# Patient Record
Sex: FEMALE | Race: WHITE | NOT HISPANIC OR LATINO | Employment: FULL TIME | URBAN - METROPOLITAN AREA
[De-identification: names, ages, dates, MRNs, and addresses within clinical notes are randomized per-mention and may not be internally consistent; named-entity substitution may affect disease eponyms.]

---

## 2017-01-13 DIAGNOSIS — I10 BENIGN ESSENTIAL HYPERTENSION: Primary | ICD-10-CM

## 2017-01-13 NOTE — TELEPHONE ENCOUNTER
Lisinopril      Last Written Prescription Date: 06/17/16  Last Fill Quantity: 90, # refills: 1  Last Office Visit with G, [facility] or [facility] prescribing provider: 01/04/16   Next 5 appointments (look out 90 days)     Feb 21, 2017  9:20 AM   [facility] WADE Dillon C[facility]    [address]  [facility] [id number] [phone]                   POTASSIUM   Date Value Ref Range Status   01/04/2016 4.0 3.4 - 5.3 mmol/L Final     CREATININE   Date Value Ref Range Status   01/04/2016 0.36* 0.52 - 1.04 mg/dL Final     BP Readings from Last 3 Encounters:   01/04/16 150/70   12/02/14 136/84   12/03/13 126/62

## 2017-01-20 RX ORDER — LISINOPRIL 10 MG/1
10 TABLET ORAL DAILY
Qty: 90 TABLET | Refills: 0 | Status: SHIPPED | OUTPATIENT
Start: 2017-01-20 | End: 2017-02-21

## 2017-01-20 NOTE — TELEPHONE ENCOUNTER
Routing refill request to provider for review/approval because:  Labs out of range:  Creatinine and BP elevated. Has upcoming appt with  in February.

## 2017-02-21 ENCOUNTER — OFFICE VISIT (OUTPATIENT)
Dept: INTERNAL MEDICINE | Facility: CLINIC | Age: 47
End: 2017-02-21
Payer: COMMERCIAL

## 2017-02-21 VITALS
WEIGHT: 194 LBS | OXYGEN SATURATION: 98 % | RESPIRATION RATE: 12 BRPM | DIASTOLIC BLOOD PRESSURE: 64 MMHG | SYSTOLIC BLOOD PRESSURE: 120 MMHG | TEMPERATURE: 98.7 F | BODY MASS INDEX: 34.38 KG/M2 | HEIGHT: 63 IN | HEART RATE: 72 BPM

## 2017-02-21 DIAGNOSIS — Z23 NEED FOR PROPHYLACTIC VACCINATION AND INOCULATION AGAINST INFLUENZA: ICD-10-CM

## 2017-02-21 DIAGNOSIS — I10 ESSENTIAL HYPERTENSION, BENIGN: ICD-10-CM

## 2017-02-21 DIAGNOSIS — R73.09 ABNORMAL GLUCOSE: ICD-10-CM

## 2017-02-21 DIAGNOSIS — Z12.31 ENCOUNTER FOR SCREENING MAMMOGRAM FOR BREAST CANCER: ICD-10-CM

## 2017-02-21 DIAGNOSIS — Z00.01 ENCOUNTER FOR GENERAL ADULT MEDICAL EXAMINATION WITH ABNORMAL FINDINGS: Primary | ICD-10-CM

## 2017-02-21 DIAGNOSIS — Z13.6 CARDIOVASCULAR SCREENING; LDL GOAL LESS THAN 130: ICD-10-CM

## 2017-02-21 DIAGNOSIS — Z83.3 FAMILY HISTORY OF DIABETES MELLITUS: ICD-10-CM

## 2017-02-21 DIAGNOSIS — I10 BENIGN ESSENTIAL HYPERTENSION: ICD-10-CM

## 2017-02-21 LAB
ALBUMIN SERPL-MCNC: 4.2 G/DL (ref 3.4–5)
ALP SERPL-CCNC: 28 U/L (ref 40–150)
ALT SERPL W P-5'-P-CCNC: 22 U/L (ref 0–50)
ANION GAP SERPL CALCULATED.3IONS-SCNC: 7 MMOL/L (ref 3–14)
AST SERPL W P-5'-P-CCNC: 15 U/L (ref 0–45)
BASOPHILS # BLD AUTO: 0 10E9/L (ref 0–0.2)
BASOPHILS NFR BLD AUTO: 0.3 %
BILIRUB SERPL-MCNC: 0.4 MG/DL (ref 0.2–1.3)
BUN SERPL-MCNC: 8 MG/DL (ref 7–30)
CALCIUM SERPL-MCNC: 9.6 MG/DL (ref 8.5–10.1)
CHLORIDE SERPL-SCNC: 103 MMOL/L (ref 94–109)
CHOLEST SERPL-MCNC: 172 MG/DL
CO2 SERPL-SCNC: 27 MMOL/L (ref 20–32)
CREAT SERPL-MCNC: 0.26 MG/DL (ref 0.52–1.04)
CREAT UR-MCNC: 27 MG/DL
DIFFERENTIAL METHOD BLD: NORMAL
EOSINOPHIL # BLD AUTO: 0.1 10E9/L (ref 0–0.7)
EOSINOPHIL NFR BLD AUTO: 1.4 %
ERYTHROCYTE [DISTWIDTH] IN BLOOD BY AUTOMATED COUNT: 12.6 % (ref 10–15)
GFR SERPL CREATININE-BSD FRML MDRD: ABNORMAL ML/MIN/1.7M2
GLUCOSE SERPL-MCNC: 99 MG/DL (ref 70–99)
HBA1C MFR BLD: 5.5 % (ref 4.3–6)
HCT VFR BLD AUTO: 41.7 % (ref 35–47)
HDLC SERPL-MCNC: 53 MG/DL
HGB BLD-MCNC: 14.2 G/DL (ref 11.7–15.7)
LDLC SERPL CALC-MCNC: 95 MG/DL
LYMPHOCYTES # BLD AUTO: 1.5 10E9/L (ref 0.8–5.3)
LYMPHOCYTES NFR BLD AUTO: 16.6 %
MCH RBC QN AUTO: 30.9 PG (ref 26.5–33)
MCHC RBC AUTO-ENTMCNC: 34.1 G/DL (ref 31.5–36.5)
MCV RBC AUTO: 91 FL (ref 78–100)
MICROALBUMIN UR-MCNC: 6 MG/L
MICROALBUMIN/CREAT UR: 21.57 MG/G CR (ref 0–25)
MONOCYTES # BLD AUTO: 0.7 10E9/L (ref 0–1.3)
MONOCYTES NFR BLD AUTO: 7.6 %
NEUTROPHILS # BLD AUTO: 6.7 10E9/L (ref 1.6–8.3)
NEUTROPHILS NFR BLD AUTO: 74.1 %
NONHDLC SERPL-MCNC: 119 MG/DL
PLATELET # BLD AUTO: 271 10E9/L (ref 150–450)
POTASSIUM SERPL-SCNC: 4.3 MMOL/L (ref 3.4–5.3)
PROT SERPL-MCNC: 7.3 G/DL (ref 6.8–8.8)
RBC # BLD AUTO: 4.6 10E12/L (ref 3.8–5.2)
SODIUM SERPL-SCNC: 137 MMOL/L (ref 133–144)
TRIGL SERPL-MCNC: 118 MG/DL
TSH SERPL DL<=0.005 MIU/L-ACNC: 1.75 MU/L (ref 0.4–4)
WBC # BLD AUTO: 9.1 10E9/L (ref 4–11)

## 2017-02-21 PROCEDURE — 90686 IIV4 VACC NO PRSV 0.5 ML IM: CPT | Performed by: NURSE PRACTITIONER

## 2017-02-21 PROCEDURE — 82043 UR ALBUMIN QUANTITATIVE: CPT | Performed by: NURSE PRACTITIONER

## 2017-02-21 PROCEDURE — 36415 COLL VENOUS BLD VENIPUNCTURE: CPT | Performed by: NURSE PRACTITIONER

## 2017-02-21 PROCEDURE — 90471 IMMUNIZATION ADMIN: CPT | Performed by: NURSE PRACTITIONER

## 2017-02-21 PROCEDURE — 83036 HEMOGLOBIN GLYCOSYLATED A1C: CPT | Performed by: NURSE PRACTITIONER

## 2017-02-21 PROCEDURE — 99396 PREV VISIT EST AGE 40-64: CPT | Mod: 25 | Performed by: NURSE PRACTITIONER

## 2017-02-21 PROCEDURE — 80050 GENERAL HEALTH PANEL: CPT | Performed by: NURSE PRACTITIONER

## 2017-02-21 PROCEDURE — 80061 LIPID PANEL: CPT | Performed by: NURSE PRACTITIONER

## 2017-02-21 RX ORDER — LISINOPRIL 10 MG/1
10 TABLET ORAL DAILY
Qty: 90 TABLET | Refills: 3 | Status: SHIPPED | OUTPATIENT
Start: 2017-02-21 | End: 2018-03-20

## 2017-02-21 RX ORDER — DILTIAZEM HYDROCHLORIDE 360 MG/1
360 CAPSULE, EXTENDED RELEASE ORAL DAILY
Qty: 90 CAPSULE | Refills: 3 | Status: SHIPPED | OUTPATIENT
Start: 2017-02-21 | End: 2018-03-20

## 2017-02-21 NOTE — MR AVS SNAPSHOT
After Visit Summary   2/21/2017    Amalia Newman    MRN: [id number]           Patient Information     Date Of Birth          1970        Visit Information        Provider Department      2/21/2017 9:20 AM Amina Olivares APRN [facility]        Today's Diagnoses     Encounter for general adult medical examination with abnormal findings    -  1    Benign essential hypertension        CARDIOVASCULAR SCREENING; LDL GOAL LESS THAN 130        Abnormal glucose        Family history of diabetes mellitus        BENIGN HYPERTENSION        Encounter for screening mammogram for breast cancer          Care Instructions    Labs in [address]    Refill on medication sent in     To schedule your mammogram, you may call the breast center at [phone].     Preventive Health Recommendations  Female Ages 40 to 49    Yearly exam:     See your health care provider every year in order to  1. Review health changes.   2. Discuss preventive care.    3. Review your medicines if your doctor prescribed any.      Get a Pap test every three years (unless you have an abnormal result and your provider advises testing more often).      If you get Pap tests with HPV test, you only need to test every 5 years, unless you have an abnormal result. You do not need a Pap test if your uterus was removed (hysterectomy) and you have not had cancer.      You should be tested each year for STDs (sexually transmitted diseases), if you're at risk.       Ask your doctor if you should have a mammogram.      Have a colonoscopy (test for colon cancer) if someone in your family has had colon cancer or polyps before age 50.       Have a cholesterol test every 5 years.       Have a diabetes test (fasting glucose) after age 45. If you are at risk for diabetes, you should have this test every 3 years.    Shots: Get a flu shot each year. Get a tetanus shot every 10 years.     Nutrition:     Eat at least 5 servings of  "fruits and vegetables each day.    Eat whole-grain bread, whole-wheat pasta and brown rice instead of white grains and rice.    Talk to your provider about Calcium and Vitamin D.     Lifestyle    Exercise at least 150 minutes a week (an average of 30 minutes a day, 5 days a week). This will help you control your weight and prevent disease.    Limit alcohol to one drink per day.    No smoking.     Wear sunscreen to prevent skin cancer.    See your dentist every six months for an exam and cleaning.        Follow-ups after your visit        Future tests that were ordered for you today     Open Future Orders        Priority Expected Expires Ordered    *MA Screening Digital Bilateral Routine  2/21/2018 2/21/2017            Who to contact     If you have questions or need follow up information about today's clinic visit or your schedule please contact [facility] directly at [phone].  Normal or non-critical lab and imaging results will be communicated to you by Opowerhart, letter or phone within 4 business days after the clinic has received the results. If you do not hear from us within 7 days, please contact the clinic through Opowerhart or phone. If you have a critical or abnormal lab result, we will notify you by phone as soon as possible.  Submit refill requests through [employer]Brick or call your pharmacy and they will forward the refill request to us. Please allow 3 business days for your refill to be completed.          Additional Information About Your Visit        SteelBrick Information     SteelBrick lets you send messages to your doctor, view your test results, renew your prescriptions, schedule appointments and more. To sign up, go to [email or web address] . Click on \"Log in\" on the left side of the screen, which will take you to the Welcome page. Then click on \"Sign up Now\" on the right side of the page.     You will be asked to enter the access code listed below, as well as some personal information. " "Please follow the directions to create your username and password.     Your access code is: [email or web address]  Expires: 2017 10:08 AM     Your access code will  in 90 days. If you need help or a new code, please call your [place] clinic or [phone].        Care EveryWhere ID     This is your Care EveryWhere ID. This could be used by other organizations to access your [place] medical records  [id number]        Your Vitals Were     Pulse Temperature Respirations Height Last Period Pulse Oximetry    72 98.7  F (37.1  C) (Oral) 12 5' 3\" (1.6 m) 2005 98%    BMI (Body Mass Index)                   34.37 kg/m2            Blood Pressure from Last 3 Encounters:   17 120/64   16 150/70   14 136/84    Weight from Last 3 Encounters:   17 194 lb (88 kg)   16 192 lb (87.1 kg)   14 184 lb 1.6 oz (83.5 kg)              We Performed the Following     Albumin Random Urine Quantitative     CBC with platelets differential     Comprehensive metabolic panel     Hemoglobin A1c     Lipid panel reflex to direct LDL     TSH with free T4 reflex          Today's Medication Changes          These changes are accurate as of: 17 10:08 AM.  If you have any questions, ask your nurse or doctor.               These medicines have changed or have updated prescriptions.        Dose/Directions    diltiazem 360 MG 24 hr CD capsule   Commonly known as:  CARDIZEM CD   This may have changed:  additional instructions   Used for:  Essential hypertension, benign   Changed by:  Amina Olivares APRN CNP        Dose:  360 mg   Take 1 capsule (360 mg) by mouth daily   Quantity:  90 capsule   Refills:  3            Where to get your medicines      These medications were sent to [place] [address] [place] [address] [place] [id number]     Phone:  [phone]     diltiazem 360 MG 24 hr CD capsule    lisinopril 10 MG tablet                Primary Care " Provider Office Phone # Fax #    Eun Cintron [phone]       [facility] [address] [address]        Thank you!     Thank you for choosing [facility]  for your care. Our goal is always to provide you with excellent care. Hearing back from our patients is one way we can continue to improve our services. Please take a few minutes to complete the written survey that you may receive in the mail after your visit with us. Thank you!             Your Updated Medication List - Protect others around you: Learn how to safely use, store and throw away your medicines at www.disposemymeds.org.          This list is accurate as of: 2/21/17 10:08 AM.  Always use your most recent med list.                   Brand Name Dispense Instructions for use    diltiazem 360 MG 24 hr CD capsule    CARDIZEM CD    90 capsule    Take 1 capsule (360 mg) by mouth daily       lisinopril 10 MG tablet    PRINIVIL/ZESTRIL    90 tablet    Take 1 tablet (10 mg) by mouth daily

## 2017-02-21 NOTE — PROGRESS NOTES
"   SUBJECTIVE:     CC: Amalia Newman is an 46 year old woman who presents for preventive health visit.     Healthy Habits:    Do you get at least three servings of calcium containing foods daily (dairy, green leafy vegetables, etc.)? yes    Amount of exercise or daily activities, outside of work: Walking halls and outside and very active at work day(s) per week    Problems taking medications regularly No    Medication side effects: No    Have you had an eye exam in the past two years? no    Do you see a dentist twice per year? no    Do you have sleep apnea, excessive snoring or daytime drowsiness?no        Blood pressure in good range on current medication     Today's PHQ-2 Score:   PHQ-2 ( 1999 Pfizer) 2/21/2017 1/4/2016   Q1: Little interest or pleasure in doing things 0 0   Q2: Feeling down, depressed or hopeless 0 0   PHQ-2 Score 0 0       Abuse: Current or Past(Physical, Sexual or Emotional)- No  Do you feel safe in your environment - Yes    Social History   Substance Use Topics     Smoking status: Never Smoker     Smokeless tobacco: Never Used     Alcohol use No      Comment: \"very rare\"     The patient does not drink >3 drinks per day nor >7 drinks per week.    Recent Labs   Lab Test  01/04/16   1117  12/02/14   0913  10/28/13   0945   CHOL  210*  191  247*   HDL  49*  62  53   LDL  129*  111  152*   TRIG  158*  90  207*   CHOLHDLRATIO   --   3.1  4.7   NHDL  161*   --    --        Reviewed orders with patient.  Reviewed health maintenance and updated orders accordingly - Yes    Mammo Decision Support:      Pertinent mammograms are reviewed under the imaging tab.  History of abnormal Pap smear: hysterectomy 2006  All Histories reviewed and updated in Epic.      ROS:  C: NEGATIVE for fever, chills, change in weight  I: NEGATIVE for worrisome rashes, moles or lesions  E: NEGATIVE for vision changes or irritation  ENT: NEGATIVE for ear, mouth and throat problems  R: NEGATIVE for significant cough or SOB  B: " "NEGATIVE for masses, tenderness or discharge  CV: NEGATIVE for chest pain, palpitations or peripheral edema  GI: NEGATIVE for nausea, abdominal pain, heartburn, or change in bowel habits  : NEGATIVE for unusual urinary or vaginal symptoms. No vaginal bleeding.  M: NEGATIVE for significant arthralgias or myalgia  N: NEGATIVE for weakness, dizziness or paresthesias  P: NEGATIVE for changes in mood or affect     Problem list, Medication list, Allergies, and Medical/Social/Surgical histories reviewed in [facility] and updated as appropriate.  OBJECTIVE:     /64 (BP Location: Left arm, Patient Position: Chair, Cuff Size: Adult Large)  Pulse 72  Temp 98.7  F (37.1  C) (Oral)  Resp 12  Ht 5' 3\" (1.6 m)  Wt 194 lb (88 kg)  LMP 12/06/2005  SpO2 98%  BMI 34.37 kg/m2  EXAM:  GENERAL APPEARANCE:  alert and no distress  EYES: Eyes grossly normal to inspection, and conjunctivae and sclerae normal  HENT: ear canals and TM's normal, nose and mouth without ulcers or lesions, oropharynx clear and oral mucous membranes moist  NECK: no adenopathy, no asymmetry, masses, or scars and thyroid normal to palpation  RESP: lungs clear to auscultation - no rales, rhonchi or wheezes  BREAST: large  without masses, tenderness or nipple discharge and no palpable axillary masses or adenopathy  CV: regular rate and rhythm, normal S1 S2, no S3 or S4, no murmur, click or rub, no peripheral edema and peripheral pulses strong  ABDOMEN: soft, nontender, no hepatosplenomegaly, no masses and bowel sounds normal  MS: no musculoskeletal defects are noted and gait is age appropriate without ataxia  SKIN: no suspicious lesions or rashes  NEURO: Normal strength and tone, sensory exam grossly normal, mentation intact and speech normal  PSYCH: mentation appears normal and affect normal/bright    ASSESSMENT/PLAN:         ICD-10-CM    1. Encounter for general adult medical examination with abnormal findings Z00.01 Lipid panel reflex to direct LDL     " "Comprehensive metabolic panel     TSH with free T4 reflex     CBC with platelets differential   2. Benign essential hypertension I10 Lipid panel reflex to direct LDL     Comprehensive metabolic panel     TSH with free T4 reflex     CBC with platelets differential     Albumin Random Urine Quantitative     lisinopril (PRINIVIL/ZESTRIL) 10 MG tablet   3. CARDIOVASCULAR SCREENING; LDL GOAL LESS THAN 130 Z13.6 Lipid panel reflex to direct LDL     Comprehensive metabolic panel   4. Abnormal glucose R73.09 Hemoglobin A1c   5. Family history of diabetes mellitus Z83.3 Hemoglobin A1c   6. BENIGN HYPERTENSION I10 diltiazem (CARDIZEM CD) 360 MG 24 hr CD capsule   7. Encounter for screening mammogram for breast cancer Z12.31 MA Screen Bilateral w/Kenneth     CANCELED: *MA Screening Digital Bilateral   8. Need for prophylactic vaccination and inoculation against influenza Z23 FLU VACCINE, 3 YRS +, IM       COUNSELING:   Reviewed preventive health counseling, as reflected in patient instructions       Regular exercise       Healthy diet/nutrition       Osteoporosis Prevention/Bone Health         reports that she has never smoked. She has never used smokeless tobacco.    Estimated body mass index is 34.37 kg/(m^2) as calculated from the following:    Height as of this encounter: 5' 3\" (1.6 m).    Weight as of this encounter: 194 lb (88 kg).   Weight management plan: Discussed healthy diet and exercise guidelines and patient will follow up in 12 months in clinic to re-evaluate.    Counseling Resources:  ATP IV Guidelines  Pooled Cohorts Equation Calculator  Breast Cancer Risk Calculator  FRAX Risk Assessment  ICSI Preventive Guidelines  Dietary Guidelines for Americans, 2010  USDA's MyPlate  ASA Prophylaxis  Lung CA Screening    WADE Dillon CNP  [facility]  "

## 2017-02-21 NOTE — PATIENT INSTRUCTIONS
Labs in [address]    Refill on medication sent in     To schedule your mammogram, you may call the breast center at [phone].     Preventive Health Recommendations  Female Ages 40 to 49    Yearly exam:     See your health care provider every year in order to  1. Review health changes.   2. Discuss preventive care.    3. Review your medicines if your doctor prescribed any.      Get a Pap test every three years (unless you have an abnormal result and your provider advises testing more often).      If you get Pap tests with HPV test, you only need to test every 5 years, unless you have an abnormal result. You do not need a Pap test if your uterus was removed (hysterectomy) and you have not had cancer.      You should be tested each year for STDs (sexually transmitted diseases), if you're at risk.       Ask your doctor if you should have a mammogram.      Have a colonoscopy (test for colon cancer) if someone in your family has had colon cancer or polyps before age 50.       Have a cholesterol test every 5 years.       Have a diabetes test (fasting glucose) after age 45. If you are at risk for diabetes, you should have this test every 3 years.    Shots: Get a flu shot each year. Get a tetanus shot every 10 years.     Nutrition:     Eat at least 5 servings of fruits and vegetables each day.    Eat whole-grain bread, whole-wheat pasta and brown rice instead of white grains and rice.    Talk to your provider about Calcium and Vitamin D.     Lifestyle    Exercise at least 150 minutes a week (an average of 30 minutes a day, 5 days a week). This will help you control your weight and prevent disease.    Limit alcohol to one drink per day.    No smoking.     Wear sunscreen to prevent skin cancer.    See your dentist every six months for an exam and cleaning.

## 2017-02-22 ENCOUNTER — TELEPHONE (OUTPATIENT)
Dept: INTERNAL MEDICINE | Facility: CLINIC | Age: 47
End: 2017-02-22

## 2017-02-22 ENCOUNTER — HOSPITAL ENCOUNTER (OUTPATIENT)
Dept: MAMMOGRAPHY | Facility: CLINIC | Age: 47
Discharge: HOME | End: 2017-02-22
Attending: NURSE PRACTITIONER | Admitting: NURSE PRACTITIONER
Payer: COMMERCIAL

## 2017-02-22 DIAGNOSIS — M76.60 ACHILLES TENDON PAIN: Primary | ICD-10-CM

## 2017-02-22 DIAGNOSIS — Z12.31 ENCOUNTER FOR SCREENING MAMMOGRAM FOR BREAST CANCER: ICD-10-CM

## 2017-02-22 DIAGNOSIS — R60.9 EDEMA, UNSPECIFIED TYPE: ICD-10-CM

## 2017-02-22 PROCEDURE — G0202 SCR MAMMO BI INCL CAD: HCPCS

## 2017-02-22 NOTE — TELEPHONE ENCOUNTER
I would have her see podiatry ; I am not sure if x ray would be beneficial   [facility]: [facility] [place] [phone]   [email or web address]    Do not think that she needs to be on lasix for swelling   Would suggest compression socks  Prescription done

## 2017-02-22 NOTE — TELEPHONE ENCOUNTER
"Pt calls stating after leaving [facility] yesterday was thinking about her foot pain. Pt states has had the achilles pain for years and is requesting the xray as discussed. Pt is also wondering if she may need Lasix PRN for the \"puffiness\" around ankles. Please advise.   "

## 2017-02-22 NOTE — TELEPHONE ENCOUNTER
Called pt, left detailed message advising of below. Asked pt to call back to see where she would like the Rx for compression socks to go, it is at the [facility]. Radha Kelsey RN

## 2017-07-18 DIAGNOSIS — I10 ESSENTIAL HYPERTENSION, BENIGN: ICD-10-CM

## 2017-07-18 RX ORDER — DILTIAZEM HYDROCHLORIDE 360 MG/1
CAPSULE, EXTENDED RELEASE ORAL
Qty: 90 CAPSULE | Refills: 1 | OUTPATIENT
Start: 2017-07-18

## 2017-07-18 NOTE — TELEPHONE ENCOUNTER
Diltiazem          Last Written Prescription Date: 02/21/17  Last Fill Quantity: 90, # refills: 2    Last Office Visit with G, P or [facility] prescribing provider:  02/21/17   Future Office Visit:      BP Readings from Last 3 Encounters:   02/21/17 120/64   01/04/16 150/70   12/02/14 136/84     Lab Results   Component Value Date    ALT 22 02/21/2017     Lab Results   Component Value Date    CHOL 172 02/21/2017     Lab Results   Component Value Date    HDL 53 02/21/2017     Lab Results   Component Value Date    LDL 95 02/21/2017     Lab Results   Component Value Date    TRIG 118 02/21/2017     Lab Results   Component Value Date    CHOLHDLRATIO 3.1 12/02/2014       Labs showing if normal/abnormal  Lab Results   Component Value Date    ALT 22 02/21/2017     Lab Results   Component Value Date    CHOL 172 02/21/2017    TRIG 118 02/21/2017    HDL 53 02/21/2017    LDL 95 02/21/2017    VLDL 18 12/02/2014    CHOLHDLRATIO 3.1 12/02/2014

## 2017-10-08 DIAGNOSIS — I10 BENIGN ESSENTIAL HYPERTENSION: ICD-10-CM

## 2017-10-09 RX ORDER — LISINOPRIL 10 MG/1
TABLET ORAL
Qty: 90 TABLET | Refills: 0 | OUTPATIENT
Start: 2017-10-09

## 2017-10-09 NOTE — TELEPHONE ENCOUNTER
LISINOPRIL      Last Written Prescription Date: 02/21/17  Last Fill Quantity: 90, # refills: 3  Last Office Visit with G, P or [facility] prescribing provider: 02/21/17       Potassium   Date Value Ref Range Status   02/21/2017 4.3 3.4 - 5.3 mmol/L Final     Creatinine   Date Value Ref Range Status   02/21/2017 0.26 (L) 0.52 - 1.04 mg/dL Final     BP Readings from Last 3 Encounters:   02/21/17 120/64   01/04/16 150/70   12/02/14 136/84

## 2018-03-06 ENCOUNTER — HOSPITAL ENCOUNTER (OUTPATIENT)
Dept: MAMMOGRAPHY | Facility: CLINIC | Age: 48
Discharge: HOME | End: 2018-03-06
Attending: NURSE PRACTITIONER | Admitting: NURSE PRACTITIONER
Payer: COMMERCIAL

## 2018-03-06 DIAGNOSIS — Z12.31 VISIT FOR SCREENING MAMMOGRAM: ICD-10-CM

## 2018-03-06 PROCEDURE — 77067 SCR MAMMO BI INCL CAD: CPT

## 2018-03-20 ENCOUNTER — OFFICE VISIT (OUTPATIENT)
Dept: INTERNAL MEDICINE | Facility: CLINIC | Age: 48
End: 2018-03-20
Payer: COMMERCIAL

## 2018-03-20 VITALS
TEMPERATURE: 98.9 F | WEIGHT: 192.5 LBS | DIASTOLIC BLOOD PRESSURE: 70 MMHG | HEART RATE: 100 BPM | OXYGEN SATURATION: 97 % | BODY MASS INDEX: 34.11 KG/M2 | HEIGHT: 63 IN | SYSTOLIC BLOOD PRESSURE: 114 MMHG

## 2018-03-20 DIAGNOSIS — I10 BENIGN ESSENTIAL HYPERTENSION: ICD-10-CM

## 2018-03-20 DIAGNOSIS — E78.1 PURE HYPERGLYCERIDEMIA: ICD-10-CM

## 2018-03-20 DIAGNOSIS — I10 ESSENTIAL HYPERTENSION, BENIGN: ICD-10-CM

## 2018-03-20 DIAGNOSIS — Z13.6 CARDIOVASCULAR SCREENING; LDL GOAL LESS THAN 130: ICD-10-CM

## 2018-03-20 DIAGNOSIS — Z00.01 ENCOUNTER FOR GENERAL ADULT MEDICAL EXAMINATION WITH ABNORMAL FINDINGS: Primary | ICD-10-CM

## 2018-03-20 DIAGNOSIS — R73.09 ABNORMAL GLUCOSE: ICD-10-CM

## 2018-03-20 DIAGNOSIS — L73.9 INFLAMED HAIR FOLLICLE: ICD-10-CM

## 2018-03-20 LAB
ALBUMIN SERPL-MCNC: 4.2 G/DL (ref 3.4–5)
ALP SERPL-CCNC: 29 U/L (ref 40–150)
ALT SERPL W P-5'-P-CCNC: 25 U/L (ref 0–50)
ANION GAP SERPL CALCULATED.3IONS-SCNC: 7 MMOL/L (ref 3–14)
AST SERPL W P-5'-P-CCNC: 17 U/L (ref 0–45)
BASOPHILS # BLD AUTO: 0 10E9/L (ref 0–0.2)
BASOPHILS NFR BLD AUTO: 0.4 %
BILIRUB SERPL-MCNC: 0.4 MG/DL (ref 0.2–1.3)
BUN SERPL-MCNC: 10 MG/DL (ref 7–30)
CALCIUM SERPL-MCNC: 9.8 MG/DL (ref 8.5–10.1)
CHLORIDE SERPL-SCNC: 105 MMOL/L (ref 94–109)
CHOLEST SERPL-MCNC: 194 MG/DL
CO2 SERPL-SCNC: 26 MMOL/L (ref 20–32)
CREAT SERPL-MCNC: 0.29 MG/DL (ref 0.52–1.04)
CREAT UR-MCNC: 78 MG/DL
DIFFERENTIAL METHOD BLD: NORMAL
EOSINOPHIL # BLD AUTO: 0.1 10E9/L (ref 0–0.7)
EOSINOPHIL NFR BLD AUTO: 1.2 %
ERYTHROCYTE [DISTWIDTH] IN BLOOD BY AUTOMATED COUNT: 12.4 % (ref 10–15)
GFR SERPL CREATININE-BSD FRML MDRD: >90 ML/MIN/1.7M2
GLUCOSE SERPL-MCNC: 95 MG/DL (ref 70–99)
HBA1C MFR BLD: 5.6 % (ref 4.3–6)
HCT VFR BLD AUTO: 41.5 % (ref 35–47)
HDLC SERPL-MCNC: 51 MG/DL
HGB BLD-MCNC: 13.9 G/DL (ref 11.7–15.7)
LDLC SERPL CALC-MCNC: 111 MG/DL
LYMPHOCYTES # BLD AUTO: 1.8 10E9/L (ref 0.8–5.3)
LYMPHOCYTES NFR BLD AUTO: 19.8 %
MCH RBC QN AUTO: 30.9 PG (ref 26.5–33)
MCHC RBC AUTO-ENTMCNC: 33.5 G/DL (ref 31.5–36.5)
MCV RBC AUTO: 92 FL (ref 78–100)
MICROALBUMIN UR-MCNC: 14 MG/L
MICROALBUMIN/CREAT UR: 18.43 MG/G CR (ref 0–25)
MONOCYTES # BLD AUTO: 0.8 10E9/L (ref 0–1.3)
MONOCYTES NFR BLD AUTO: 8.4 %
NEUTROPHILS # BLD AUTO: 6.4 10E9/L (ref 1.6–8.3)
NEUTROPHILS NFR BLD AUTO: 70.2 %
NONHDLC SERPL-MCNC: 143 MG/DL
PLATELET # BLD AUTO: 290 10E9/L (ref 150–450)
POTASSIUM SERPL-SCNC: 3.8 MMOL/L (ref 3.4–5.3)
PROT SERPL-MCNC: 7.4 G/DL (ref 6.8–8.8)
RBC # BLD AUTO: 4.5 10E12/L (ref 3.8–5.2)
SODIUM SERPL-SCNC: 138 MMOL/L (ref 133–144)
TRIGL SERPL-MCNC: 161 MG/DL
TSH SERPL DL<=0.005 MIU/L-ACNC: 1.35 MU/L (ref 0.4–4)
WBC # BLD AUTO: 9.1 10E9/L (ref 4–11)

## 2018-03-20 PROCEDURE — 80053 COMPREHEN METABOLIC PANEL: CPT | Performed by: NURSE PRACTITIONER

## 2018-03-20 PROCEDURE — 83036 HEMOGLOBIN GLYCOSYLATED A1C: CPT | Performed by: NURSE PRACTITIONER

## 2018-03-20 PROCEDURE — 99396 PREV VISIT EST AGE 40-64: CPT | Performed by: NURSE PRACTITIONER

## 2018-03-20 PROCEDURE — 36415 COLL VENOUS BLD VENIPUNCTURE: CPT | Performed by: NURSE PRACTITIONER

## 2018-03-20 PROCEDURE — 82043 UR ALBUMIN QUANTITATIVE: CPT | Performed by: NURSE PRACTITIONER

## 2018-03-20 PROCEDURE — 80061 LIPID PANEL: CPT | Performed by: NURSE PRACTITIONER

## 2018-03-20 PROCEDURE — 85025 COMPLETE CBC W/AUTO DIFF WBC: CPT | Performed by: NURSE PRACTITIONER

## 2018-03-20 PROCEDURE — 84443 ASSAY THYROID STIM HORMONE: CPT | Performed by: NURSE PRACTITIONER

## 2018-03-20 RX ORDER — ASCORBIC ACID 500 MG
1000 TABLET ORAL DAILY
Qty: 30 TABLET | COMMUNITY
Start: 2018-03-20

## 2018-03-20 RX ORDER — DILTIAZEM HYDROCHLORIDE 360 MG/1
360 CAPSULE, EXTENDED RELEASE ORAL DAILY
Qty: 90 CAPSULE | Refills: 3 | Status: SHIPPED | OUTPATIENT
Start: 2018-03-20 | End: 2019-04-13

## 2018-03-20 RX ORDER — LISINOPRIL 10 MG/1
10 TABLET ORAL DAILY
Qty: 90 TABLET | Refills: 3 | Status: SHIPPED | OUTPATIENT
Start: 2018-03-20 | End: 2019-04-13

## 2018-03-20 NOTE — LETTER
March 21, 2018      Amalia Newman  [address] [facility] [address]        Dear ,    We are writing to inform you of your test results.    Your test results fall within the expected range(s) or remain unchanged from previous results.  Please continue with current treatment plan.    Resulted Orders   Lipid panel reflex to direct LDL Fasting   Result Value Ref Range    Cholesterol 194 <200 mg/dL    Triglycerides 161 (H) <150 mg/dL      Comment:      Borderline high:  150-199 mg/dl  High:             200-499 mg/dl  Very high:       >499 mg/dl  Fasting specimen      HDL Cholesterol 51 >49 mg/dL    LDL Cholesterol Calculated 111 (H) <100 mg/dL      Comment:      Above desirable:  100-129 mg/dl  Borderline High:  130-159 mg/dL  High:             160-189 mg/dL  Very high:       >189 mg/dl      Non HDL Cholesterol 143 (H) <130 mg/dL      Comment:      Above Desirable:  130-159 mg/dl  Borderline high:  160-189 mg/dl  High:             190-219 mg/dl  Very high:       >219 mg/dl     Comprehensive metabolic panel   Result Value Ref Range    Sodium 138 133 - 144 mmol/L    Potassium 3.8 3.4 - 5.3 mmol/L    Chloride 105 94 - 109 mmol/L    Carbon Dioxide 26 20 - 32 mmol/L    Anion Gap 7 3 - 14 mmol/L    Glucose 95 70 - 99 mg/dL      Comment:      Fasting specimen    Urea Nitrogen 10 7 - 30 mg/dL    Creatinine 0.29 (L) 0.52 - 1.04 mg/dL    GFR Estimate >90 >60 mL/min/1.7m2      Comment:      Non  GFR Calc    GFR Estimate If Black >90 >60 mL/min/1.7m2      Comment:       GFR Calc    Calcium 9.8 8.5 - 10.1 mg/dL    Bilirubin Total 0.4 0.2 - 1.3 mg/dL    Albumin 4.2 3.4 - 5.0 g/dL    Protein Total 7.4 6.8 - 8.8 g/dL    Alkaline Phosphatase 29 (L) 40 - 150 U/L    ALT 25 0 - 50 U/L    AST 17 0 - 45 U/L   TSH with free T4 reflex   Result Value Ref Range    TSH 1.35 0.40 - 4.00 mU/L   CBC with platelets differential   Result Value Ref Range    WBC 9.1 4.0 - 11.0 10e9/L    RBC  Count 4.50 3.8 - 5.2 10e12/L    Hemoglobin 13.9 11.7 - 15.7 g/dL    Hematocrit 41.5 35.0 - 47.0 %    MCV 92 78 - 100 fl    MCH 30.9 26.5 - 33.0 pg    MCHC 33.5 31.5 - 36.5 g/dL    RDW 12.4 10.0 - 15.0 %    Platelet Count 290 150 - 450 10e9/L    Diff Method Automated Method     % Neutrophils 70.2 %    % Lymphocytes 19.8 %    % Monocytes 8.4 %    % Eosinophils 1.2 %    % Basophils 0.4 %    Absolute Neutrophil 6.4 1.6 - 8.3 10e9/L    Absolute Lymphocytes 1.8 0.8 - 5.3 10e9/L    Absolute Monocytes 0.8 0.0 - 1.3 10e9/L    Absolute Eosinophils 0.1 0.0 - 0.7 10e9/L    Absolute Basophils 0.0 0.0 - 0.2 10e9/L   Albumin Random Urine Quantitative with Creat Ratio   Result Value Ref Range    Creatinine Urine 78 mg/dL    Albumin Urine mg/L 14 mg/L    Albumin Urine mg/g Cr 18.43 0 - 25 mg/g Cr   Hemoglobin A1c   Result Value Ref Range    Hemoglobin A1C 5.6 4.3 - 6.0 %       If you have any questions or concerns, please call the clinic at the number listed above.       Sincerely,        WADE Dillon CNP

## 2018-03-20 NOTE — MR AVS SNAPSHOT
"              After Visit Summary   3/20/2018    Amalia Newman    MRN: [id number]           Patient Information     Date Of Birth          1970        Visit Information        Provider Department      3/20/2018 10:00 AM Amina Olivares APRN CNP [facility]        Today's Diagnoses     Encounter for general adult medical examination with abnormal findings    -  1    Pure hyperglyceridemia        CARDIOVASCULAR SCREENING; LDL GOAL LESS THAN 130        Benign essential hypertension        Abnormal glucose        BENIGN HYPERTENSION        Inflamed hair follicle          Care Instructions    Lab in [address]    Warm pack to right inner thigh hair follicle    If red swelling or pain in area we might consider antibiotics           Follow-ups after your visit        Who to contact     If you have questions or need follow up information about today's clinic visit or your schedule please contact [facility] directly at [phone].  Normal or non-critical lab and imaging results will be communicated to you by MyChart, letter or phone within 4 business days after the clinic has received the results. If you do not hear from us within 7 days, please contact the clinic through MyChart or phone. If you have a critical or abnormal lab result, we will notify you by phone as soon as possible.  Submit refill requests through [employer] or call your pharmacy and they will forward the refill request to us. Please allow 3 business days for your refill to be completed.          Additional Information About Your Visit        Care EveryWhere ID     This is your Care EveryWhere ID. This could be used by other organizations to access your [place] medical records  [id number]        Your Vitals Were     Pulse Temperature Height Last Period Pulse Oximetry Breastfeeding?    100 98.9  F (37.2  C) (Oral) 5' 3\" (1.6 m) 12/06/2005 97% No    BMI (Body Mass Index)                   34.1 kg/m2            " Blood Pressure from Last 3 Encounters:   03/20/18 114/70   02/21/17 120/64   01/04/16 150/70    Weight from Last 3 Encounters:   03/20/18 192 lb 8 oz (87.3 kg)   02/21/17 194 lb (88 kg)   01/04/16 192 lb (87.1 kg)              We Performed the Following     Albumin Random Urine Quantitative with Creat Ratio     CBC with platelets differential     Comprehensive metabolic panel     Hemoglobin A1c     Lipid panel reflex to direct LDL Fasting     TSH with free T4 reflex          Where to get your medicines      These medications were sent to [place] [address] [facility] - [place], [place] - [address]  [address], [facility] [address]     Phone:  [phone]     diltiazem 360 MG 24 hr CD capsule    lisinopril 10 MG tablet          Primary Care Provider Office Phone # Fax #    Chandrika Dustin [place] [phone]       [address] [address]        Equal Access to Services     ENRIQUE GOMEZ AH: Hadii aad ku hadasho Soomaali, waaxda luqadaha, qaybta kaalmada adeegyada, waxay idiin hayaan kristina hull . So [facility] [phone].    ATENCIÓN: Si habla español, tiene a umaña disposición servicios gratuitos de asistencia lingüística. [facility] [phone].    We comply with applicable federal civil rights laws and [place] laws. We do not discriminate on the basis of race, color, national origin, age, disability, sex, sexual orientation, or gender identity.            Thank you!     Thank you for choosing [facility]  for your care. Our goal is always to provide you with excellent care. Hearing back from our patients is one way we can continue to improve our services. Please take a few minutes to complete the written survey that you may receive in the mail after your visit with us. Thank you!             Your Updated Medication List - Protect others around you: Learn how to safely use, store and throw away your medicines at www.disposemymeds.org.           This list is accurate as of 3/20/18 10:50 AM.  Always use your most recent med list.                   Brand Name Dispense Instructions for use Diagnosis    ascorbic acid 500 MG tablet    VITAMIN C    30 tablet    Take 2 tablets (1,000 mg) by mouth daily        diltiazem 360 MG 24 hr CD capsule    CARDIZEM CD    90 capsule    Take 1 capsule (360 mg) by mouth daily    Essential hypertension, benign       lisinopril 10 MG tablet    PRINIVIL/ZESTRIL    90 tablet    Take 1 tablet (10 mg) by mouth daily    Benign essential hypertension       order for DME     3 Device    Equipment being ordered: compression socks 15-20 mm hg knee high Dispense 3 pair and refill prn    Edema, unspecified type       QUNOL COQ10/UBIQUINOL/ANDERS 100 MG Caps   Generic drug:  Ubiquinol

## 2018-03-20 NOTE — NURSING NOTE
"Chief Complaint   Patient presents with     Physical       Initial /70 (BP Location: Left arm, Patient Position: Chair, Cuff Size: Adult Large)  Pulse 100  Temp 98.9  F (37.2  C) (Oral)  Ht 5' 3\" (1.6 m)  Wt 192 lb 8 oz (87.3 kg)  LMP 12/06/2005  SpO2 97%  Breastfeeding? Yes  BMI 34.1 kg/m2 Estimated body mass index is 34.1 kg/(m^2) as calculated from the following:    Height as of this encounter: 5' 3\" (1.6 m).    Weight as of this encounter: 192 lb 8 oz (87.3 kg).  Medication Reconciliation: complete    "

## 2018-03-20 NOTE — PATIENT INSTRUCTIONS
Lab in [address]    Warm pack to right inner thigh hair follicle    If red swelling or pain in area we might consider antibiotics

## 2018-03-20 NOTE — PROGRESS NOTES
" SUBJECTIVE:   CC: Amalia Newman is an 47 year old woman who presents for preventive health visit.     Fasting.     Needs refill     Had hysterectomy negative for malignancy   Ovaries intact   Cervix removed      Healthy Habits:    Do you get at least three servings of calcium containing foods daily (dairy, green leafy vegetables, etc.)? No    Amount of exercise or daily activities, outside of work: 7 day(s) per week    Problems taking medications regularly No    Medication side effects: No    Have you had an eye exam in the past two years? yes    Do you see a dentist twice per year? no    Do you have sleep apnea, excessive snoring or daytime drowsiness?no      Today's PHQ-2 Score:   PHQ-2 ( 1999 [employer]) 3/20/2018 2/21/2017   Q1: Little interest or pleasure in doing things 0 0   Q2: Feeling down, depressed or hopeless - 0   PHQ-2 Score - 0       Abuse: Current or Past(Physical, Sexual or Emotional)- No  Do you feel safe in your environment - Yes    Social History   Substance Use Topics     Smoking status: Never Smoker     Smokeless tobacco: Never Used     Alcohol use No      Comment: \"very rare\"     If you drink alcohol do you typically have >3 drinks per day or >7 drinks per week? No                     Reviewed orders with patient.  Reviewed health maintenance and updated orders accordingly - Yes          Pertinent mammograms are reviewed under the imaging tab.  History of abnormal Pap smear: Status post benign hysterectomy. Health Maintenance and Surgical History updated.    Reviewed and updated as needed this visit by clinical staff  Tobacco  Allergies  Meds  Med Hx  Surg Hx  Fam Hx  Soc Hx        Reviewed and updated as needed this visit by Provider            ROS:  C: NEGATIVE for fever, chills, change in weight  I: NEGATIVE for worrisome rashes, moles or lesions  E: NEGATIVE for vision changes or irritation  ENT: NEGATIVE for ear, mouth and throat problems  R: NEGATIVE for significant cough or " "SOB  B: NEGATIVE for masses, tenderness or discharge  CV: NEGATIVE for chest pain, palpitations or peripheral edema  GI: NEGATIVE for nausea, abdominal pain, heartburn, or change in bowel habits  : NEGATIVE for unusual urinary or vaginal symptoms. No vaginal bleeding.  M: NEGATIVE for significant arthralgias or myalgia  N: NEGATIVE for weakness, dizziness or paresthesias  P: NEGATIVE for changes in mood or affect     OBJECTIVE:   /70 (BP Location: Left arm, Patient Position: Chair, Cuff Size: Adult Large)  Pulse 100  Temp 98.9  F (37.2  C) (Oral)  Ht 5' 3\" (1.6 m)  Wt 192 lb 8 oz (87.3 kg)  LMP 12/06/2005  SpO2 97%  Breastfeeding? No  BMI 34.1 kg/m2  EXAM:  GENERAL APPEARANCE: alert and no distress  EYES: Eyes grossly normal to inspection, and conjunctivae and sclerae normal  HENT: ear canals and TM's normal, nose and mouth without ulcers or lesions, oropharynx clear and oral mucous membranes moist  NECK: no adenopathy, no asymmetry, masses, or scars and thyroid normal to palpation  RESP: lungs clear to auscultation - no rales, rhonchi or wheezes  BREAST: normal without masses, tenderness or nipple discharge and no palpable axillary masses or adenopathy  CV: regular rate and rhythm, normal S1 S2, no S3 or S4, no murmur, click or rub, no peripheral edema   ABDOMEN: soft, nontender, no hepatosplenomegaly, no masses and bowel sounds normal  MS: no musculoskeletal defects are noted and gait is age appropriate without ataxia  Pelvic Exam:  Vulva: No external lesions, normal hair distribution, no adenopathy  Vagina: Moist, pink, no abnormal discharge,  no lesions  Exam without any pain or abnormal finding - exam limited by body habitus     SKIN: inflamed hair follicle groin on right side - no drainage   NEURO: Normal strength and tone, sensory exam grossly normal, mentation intact and speech normal  PSYCH: mentation appears normal and affect normal/bright    ASSESSMENT/PLAN:   (Z00.01) Encounter for " "general adult medical examination with abnormal findings  (primary encounter diagnosis)  Comment:   Plan: Lipid panel reflex to direct LDL Fasting,         Comprehensive metabolic panel, TSH with free T4        reflex, CBC with platelets differential,         Albumin Random Urine Quantitative with Creat         Ratio            (E78.1) Pure hyperglyceridemia  Comment:   Plan: Lipid panel reflex to direct LDL Fasting,         Comprehensive metabolic panel            (Z13.6) CARDIOVASCULAR SCREENING; LDL GOAL LESS THAN 130  Comment:   Plan: Lipid panel reflex to direct LDL Fasting,         Comprehensive metabolic panel            (I10) Benign essential hypertension  Comment: good control and tolerating medication   Plan: Lipid panel reflex to direct LDL Fasting,         Comprehensive metabolic panel, TSH with free T4        reflex, CBC with platelets differential,         lisinopril (PRINIVIL/ZESTRIL) 10 MG tablet            (R73.09) Abnormal glucose  Comment:   Plan: Comprehensive metabolic panel, Hemoglobin A1c            (I10) BENIGN HYPERTENSION  Comment:   Plan: diltiazem (CARDIZEM CD) 360 MG 24 hr CD capsule            (L73.9) Inflamed hair follicle  Comment: discussed   Plan: warm pack           COUNSELING:   Reviewed preventive health counseling, as reflected in patient instructions       Regular exercise       Healthy diet/nutrition       Osteoporosis Prevention/Bone Health         reports that she has never smoked. She has never used smokeless tobacco.    Estimated body mass index is 34.1 kg/(m^2) as calculated from the following:    Height as of this encounter: 5' 3\" (1.6 m).    Weight as of this encounter: 192 lb 8 oz (87.3 kg).   Weight management plan: Discussed healthy diet and exercise guidelines and patient will follow up in 12 months in clinic to re-evaluate.    Counseling Resources:  ATP IV Guidelines  Pooled Cohorts Equation Calculator  Breast Cancer Risk Calculator  FRAX Risk Assessment  ICSI " Preventive Guidelines  Dietary Guidelines for Americans, 2010  USDA's MyPlate  ASA Prophylaxis  Lung CA Screening    WADE Dillon CNP  [facility]

## 2018-04-08 ENCOUNTER — OFFICE VISIT (OUTPATIENT)
Dept: URGENT CARE | Facility: URGENT CARE | Age: 48
End: 2018-04-08
Payer: COMMERCIAL

## 2018-04-08 VITALS
DIASTOLIC BLOOD PRESSURE: 81 MMHG | OXYGEN SATURATION: 98 % | TEMPERATURE: 98.8 F | HEART RATE: 92 BPM | SYSTOLIC BLOOD PRESSURE: 132 MMHG

## 2018-04-08 DIAGNOSIS — L02.519 ABSCESS OF FINGER, UNSPECIFIED LATERALITY: ICD-10-CM

## 2018-04-08 DIAGNOSIS — S60.459A FOREIGN BODY OF FINGER: Primary | ICD-10-CM

## 2018-04-08 PROCEDURE — 99213 OFFICE O/P EST LOW 20 MIN: CPT | Mod: 25 | Performed by: PHYSICIAN ASSISTANT

## 2018-04-08 PROCEDURE — 10120 INC&RMVL FB SUBQ TISS SMPL: CPT | Performed by: PHYSICIAN ASSISTANT

## 2018-04-08 RX ORDER — CEPHALEXIN 500 MG/1
500 CAPSULE ORAL 3 TIMES DAILY
Qty: 30 CAPSULE | Refills: 0 | Status: SHIPPED | OUTPATIENT
Start: 2018-04-08 | End: 2019-04-16

## 2018-04-08 NOTE — MR AVS SNAPSHOT
After Visit Summary   4/8/2018    Amalia Newman    MRN: [id number]           Patient Information     Date Of Birth          1970        Visit Information        Provider Department      4/8/2018 9:00 AM Johnny Bansal PA-C [facility]        Today's Diagnoses     Foreign body of finger    -  1    Abscess of finger, unspecified laterality           Follow-ups after your visit        Who to contact     If you have questions or need follow up information about today's clinic visit or your schedule please contact [facility] directly at [phone].  Normal or non-critical lab and imaging results will be communicated to you by MyChart, letter or phone within 4 business days after the clinic has received the results. If you do not hear from us within 7 days, please contact the clinic through MyChart or phone. If you have a critical or abnormal lab result, we will notify you by phone as soon as possible.  Submit refill requests through [employer] or call your pharmacy and they will forward the refill request to us. Please allow 3 business days for your refill to be completed.          Additional Information About Your Visit        Care EveryWhere ID     This is your Care EveryWhere ID. This could be used by other organizations to access your [place] medical records  [id number]        Your Vitals Were     Pulse Temperature Last Period Pulse Oximetry          92 98.8  F (37.1  C) (Oral) 12/06/2005 98%         Blood Pressure from Last 3 Encounters:   04/08/18 132/81   03/20/18 114/70   02/21/17 120/64    Weight from Last 3 Encounters:   03/20/18 192 lb 8 oz (87.3 kg)   02/21/17 194 lb (88 kg)   01/04/16 192 lb (87.1 kg)              We Performed the Following     Nursing Communication 1     REMOVE FOREIGN BODY SIMPLE          Today's Medication Changes          These changes are accurate as of 4/8/18 10:09 AM.  If you have any questions, ask  your nurse or doctor.               Start taking these medicines.        Dose/Directions    cephALEXin 500 MG capsule   Commonly known as:  KEFLEX   Used for:  Abscess of finger, unspecified laterality        Dose:  500 mg   Take 1 capsule (500 mg) by mouth 3 times daily   Quantity:  30 capsule   Refills:  0         Stop taking these medicines if you haven't already. Please contact your care team if you have questions.     order for DME                Where to get your medicines      These medications were sent to [place] [address] [facility] - [facility] [address] [place] [address] [address]  [address] [facility] [address]     Phone:  [phone]     cephALEXin 500 MG capsule                Primary Care Provider Office Phone # Fax #    Chandrika Washington [phone]       [address] [address]        Equal Access to Services     ENRIQUE GOMEZ : Hadii marlon prince hadasho Somelissa, waaxda luqadaha, qaybta kaalmada adecharleneyada, sven molina. So [facility] [phone].    ATENCIÓN: Si habla español, tiene a umaña disposición servicios gratuitos de asistencia lingüística. Pasquale al [phone].    We comply with applicable federal civil rights laws and [place] laws. We do not discriminate on the basis of race, color, national origin, age, disability, sex, sexual orientation, or gender identity.            Thank you!     Thank you for choosing [facility]  for your care. Our goal is always to provide you with excellent care. Hearing back from our patients is one way we can continue to improve our services. Please take a few minutes to complete the written survey that you may receive in the mail after your visit with us. Thank you!             Your Updated Medication List - Protect others around you: Learn how to safely use, store and throw away your medicines at www.disposemymeds.org.          This list is accurate as of  4/8/18 10:09 AM.  Always use your most recent med list.                   Brand Name Dispense Instructions for use Diagnosis    ascorbic acid 500 MG tablet    VITAMIN C    30 tablet    Take 2 tablets (1,000 mg) by mouth daily        cephALEXin 500 MG capsule    KEFLEX    30 capsule    Take 1 capsule (500 mg) by mouth 3 times daily    Abscess of finger, unspecified laterality       diltiazem 360 MG 24 hr CD capsule    CARDIZEM CD    90 capsule    Take 1 capsule (360 mg) by mouth daily    Essential hypertension, benign       lisinopril 10 MG tablet    PRINIVIL/ZESTRIL    90 tablet    Take 1 tablet (10 mg) by mouth daily    Benign essential hypertension       QUNOL COQ10/UBIQUINOL/ANDRES 100 MG Caps   Generic drug:  Ubiquinol

## 2018-04-08 NOTE — PROGRESS NOTES
"SUBJECTIVE:  Chief Complaint   Patient presents with     Foreign Body in Skin     sliver in left index finger.      Amalia Newman is a 48 year old female presents with a chief complaint of left finger  second swelling, tenderness and redness.  The injury occurred 5 day(s) ago.   The injury happened while at home. How: splinter.  The patient complained of mild pain  and has not had decreased ROM.  Pain exacerbated by movement.  Relieved by rest.  She treated it initially with no therapy. This is the first time this type of injury has occurred to this patient.     Past Medical History:   Diagnosis Date     Essential hypertension, benign 1996     Pure hyperglyceridemia      Tachycardia      Allergies   Allergen Reactions     No Known Allergies      Social History   Substance Use Topics     Smoking status: Never Smoker     Smokeless tobacco: Never Used     Alcohol use No      Comment: \"very rare\"       ROS:  CONSTITUTIONAL:NEGATIVE for fever, chills, change in weight  INTEGUMENTARY/SKIN: POSITIVE for swelling, localized foreign body in finger  MUSCULOSKELETAL: positive for finger tenderness  VASC: NEGATIVE for cold extremity  NEURO: NEGATIVE for weakness, dizziness or paresthesias    EXAM:   /81  Pulse 92  Temp 98.8  F (37.1  C) (Oral)  LMP 12/06/2005  SpO2 98%  Gen: healthy,alert,no distress  Extremity: finger  second has swelling and point tenderness .   There is not compromise to the distal circulation.  Pulses are +2 and CRT is brisk  EXTREMITIES: peripheral pulses normal  MS:  Positive for left index finger foreign body swelling  SKIN: Positive for localized infection  NEURO: Normal strength and tone, sensory exam grossly normal, mentation intact and speech normal    X-RAY was not done.     ASSESSMENT/PLAN:    ICD-10-CM    1. Foreign body of finger S60.459A REMOVE FOREIGN BODY SIMPLE   2. Abscess of finger, unspecified laterality L02.519 cephALEXin (KEFLEX) 500 MG capsule         Motrin  Monitor for " any worsening symptoms    PROCEDURE:  Skin prep with betadine and alchohol  Digital block performed in semi-sterile conditions  11 blade used to make incision  Foreign material removed  Dressed with bandage  Patient tolerated procedure well

## 2019-04-13 DIAGNOSIS — I10 ESSENTIAL HYPERTENSION, BENIGN: ICD-10-CM

## 2019-04-13 DIAGNOSIS — I10 BENIGN ESSENTIAL HYPERTENSION: ICD-10-CM

## 2019-04-15 RX ORDER — LISINOPRIL 10 MG/1
10 TABLET ORAL DAILY
Qty: 30 TABLET | Refills: 0 | Status: SHIPPED | OUTPATIENT
Start: 2019-04-15 | End: 2019-04-16

## 2019-04-15 RX ORDER — DILTIAZEM HYDROCHLORIDE 360 MG/1
360 CAPSULE, EXTENDED RELEASE ORAL DAILY
Qty: 30 CAPSULE | Refills: 0 | Status: SHIPPED | OUTPATIENT
Start: 2019-04-15 | End: 2019-04-16

## 2019-04-15 NOTE — TELEPHONE ENCOUNTER
"Requested Prescriptions   Pending Prescriptions Disp Refills     lisinopril (PRINIVIL/ZESTRIL) 10 MG tablet [Pharmacy Med Name: LISINOPRIL 10 MG TABLET] 90 tablet 3     Sig: TAKE 1 TABLET (10 MG) BY MOUTH DAILY   Last Written Prescription Date:  03/20/2018  Last Fill Quantity: 90,  # refills: 3   Last office visit: 3/20/2018 with prescribing provider:     Future Office Visit:   Next 5 appointments (look out 90 days)    Apr 16, 2019  9:20 AM CDT  [facility] [address]  [facility] [address]  [phone]           ACE Inhibitors (Including Combos) Protocol Failed - 4/13/2019  4:31 AM        Failed - Blood pressure under 140/90 in past 12 months     BP Readings from Last 3 Encounters:   04/08/18 132/81   03/20/18 114/70   02/21/17 120/64                 Failed - Normal serum creatinine on file in past 12 months     Recent Labs   Lab Test 03/20/18  1058   CR 0.29*             Failed - Normal serum potassium on file in past 12 months     Recent Labs   Lab Test 03/20/18  1058   POTASSIUM 3.8             Passed - Recent (12 mo) or future (30 days) visit within the authorizing provider's specialty     Patient had office visit in the last 12 months or has a visit in the next 30 days with authorizing provider or within the authorizing provider's specialty.  See \"Patient Info\" tab in inbasket, or \"Choose Columns\" in Meds & Orders section of the refill encounter.              Passed - Medication is active on med list        Passed - Patient is age 18 or older        Passed - No active pregnancy on record        Passed - No positive pregnancy test within past 12 months        diltiazem ER COATED BEADS (CARDIZEM CD) 360 MG 24 hr capsule [Pharmacy Med Name: DILTIAZEM 24HR  MG CAP] 90 capsule 3     Sig: TAKE 1 CAPSULE (360 MG) BY MOUTH DAILY   Last Written Prescription Date:  03/20/2018  Last Fill Quantity: 90,  # " "refills: 3   Last office visit: 3/20/2018 with prescribing provider:     Future Office Visit:   Next 5 appointments (look out 90 days)    Apr 16, 2019  9:20 AM CDT  PHYSICAL with WADE Dillon CNP  [facility] [address]  [facility] [address]  [phone]           Calcium Channel Blockers Protocol  Failed - 4/13/2019  4:31 AM        Failed - Blood pressure under 140/90 in past 12 months     BP Readings from Last 3 Encounters:   04/08/18 132/81   03/20/18 114/70   02/21/17 120/64                 Failed - Normal ALT in past 12 months     Recent Labs   Lab Test 03/20/18  1058   ALT 25             Failed - Normal serum creatinine on file in past 12 months     Recent Labs   Lab Test 03/20/18  1058   CR 0.29*             Passed - Recent (12 mo) or future (30 days) visit within the authorizing provider's specialty     Patient had office visit in the last 12 months or has a visit in the next 30 days with authorizing provider or within the authorizing provider's specialty.  See \"Patient Info\" tab in inbasket, or \"Choose Columns\" in Meds & Orders section of the refill encounter.              Passed - Medication is active on med list        Passed - Patient is age 18 or older        Passed - No active pregnancy on record        Passed - No positive pregnancy test in past 12 months        "

## 2019-04-15 NOTE — TELEPHONE ENCOUNTER
Lisinopril & Diltiezam  Medication is being filled for 1 time refill only due to:  Patient needs to be seen because it has been more than one year since last visit.    Next 5 appointments (look out 90 days)    Apr 16, 2019  9:20 AM CDT  PHYSICAL with WADE Dillon CNP  [facility] ([facility]) [address]  [facility] [address] [phone]

## 2019-04-16 ENCOUNTER — OFFICE VISIT (OUTPATIENT)
Dept: INTERNAL MEDICINE | Facility: CLINIC | Age: 49
End: 2019-04-16
Payer: COMMERCIAL

## 2019-04-16 VITALS
HEIGHT: 64 IN | TEMPERATURE: 98.4 F | DIASTOLIC BLOOD PRESSURE: 50 MMHG | BODY MASS INDEX: 31.92 KG/M2 | OXYGEN SATURATION: 97 % | SYSTOLIC BLOOD PRESSURE: 102 MMHG | RESPIRATION RATE: 20 BRPM | WEIGHT: 187 LBS | HEART RATE: 74 BPM

## 2019-04-16 DIAGNOSIS — Z12.31 ENCOUNTER FOR SCREENING MAMMOGRAM FOR BREAST CANCER: ICD-10-CM

## 2019-04-16 DIAGNOSIS — Z11.4 ENCOUNTER FOR SCREENING FOR HIV: ICD-10-CM

## 2019-04-16 DIAGNOSIS — Z00.01 ENCOUNTER FOR GENERAL ADULT MEDICAL EXAMINATION WITH ABNORMAL FINDINGS: Primary | ICD-10-CM

## 2019-04-16 DIAGNOSIS — I10 BENIGN ESSENTIAL HYPERTENSION: ICD-10-CM

## 2019-04-16 DIAGNOSIS — E78.1 PURE HYPERGLYCERIDEMIA: ICD-10-CM

## 2019-04-16 PROCEDURE — 99396 PREV VISIT EST AGE 40-64: CPT | Performed by: NURSE PRACTITIONER

## 2019-04-16 RX ORDER — LISINOPRIL 10 MG/1
10 TABLET ORAL DAILY
Qty: 30 TABLET | Refills: 11 | Status: SHIPPED | OUTPATIENT
Start: 2019-04-16 | End: 2020-04-09

## 2019-04-16 RX ORDER — DILTIAZEM HYDROCHLORIDE 360 MG/1
360 CAPSULE, EXTENDED RELEASE ORAL DAILY
Qty: 30 CAPSULE | Refills: 11 | Status: SHIPPED | OUTPATIENT
Start: 2019-04-16 | End: 2020-04-09

## 2019-04-16 ASSESSMENT — MIFFLIN-ST. JEOR: SCORE: 1445.29

## 2019-04-16 ASSESSMENT — PAIN SCALES - GENERAL: PAINLEVEL: NO PAIN (0)

## 2019-04-16 NOTE — PATIENT INSTRUCTIONS
Lab appointment   You need to be fasting for 12hrs. You can have water and any meds you are on. But, no food, coffee, tea or diet pop.    To schedule your mammogram, you may call the breast center at [phone].     Consider SHINGREX vaccine     Medication refills

## 2019-04-16 NOTE — PROGRESS NOTES
"   SUBJECTIVE:   CC: Amalia Newman is an 49 year old woman who presents for preventive health visit.     Healthy Habits:    Getting at least 3 servings of Calcium per day:  Yes    Bi-annual eye exam:  NO    Dental care twice a year:  NO    Sleep apnea or symptoms of sleep apnea:  None    Diet:  Regular (no restrictions)    Frequency of exercise:  6-7 days/week    Duration of exercise:  Greater than 60 minutes    Taking medications regularly:  Yes    Barriers to taking medications:  Not applicable    Medication side effects:  None    PHQ-2 Total Score:    Additional concerns today:  No    Today's PHQ-2 Score:   PHQ-2 ( 1999 Pfizer) 3/20/2018   Q1: Little interest or pleasure in doing things 0   Q2: Feeling down, depressed or hopeless -   PHQ-2 Score -       Abuse: Current or Past(Physical, Sexual or Emotional)- No  Do you feel safe in your environment? Yes    Social History     Tobacco Use     Smoking status: Never Smoker     Smokeless tobacco: Never Used   Substance Use Topics     Alcohol use: Yes     Comment: \"very rare\"     If you drink alcohol do you typically have >3 drinks per day or >7 drinks per week? No    Alcohol Use 4/16/2019   Prescreen: >3 drinks/day or >7 drinks/week? No       Reviewed orders with patient.  Reviewed health maintenance and updated orders accordingly - Yes          Pertinent mammograms are reviewed under the imaging tab.  History of abnormal Pap smear: Status post benign hysterectomy. Health Maintenance and Surgical History updated.  PAP / HPV 1/22/2008 1/9/2007 12/13/2005   PAP NIL NIL NIL     Reviewed and updated as needed this visit by clinical staff  Tobacco  Allergies  Meds  Med Hx  Surg Hx  Fam Hx  Soc Hx        Reviewed and updated as needed this visit by Provider  Allergies            Review of Systems  CONSTITUTIONAL: NEGATIVE for fever, chills, change in weight  INTEGUMENTARY/SKIN: NEGATIVE for worrisome rashes, moles or lesions  EYES: NEGATIVE for vision changes or " "irritation  ENT: NEGATIVE for ear, mouth and throat problems  RESP: NEGATIVE for significant cough or SOB  BREAST: NEGATIVE for masses, tenderness or discharge  CV: NEGATIVE for chest pain, palpitations or peripheral edema  GI: NEGATIVE for nausea, abdominal pain, heartburn, or change in bowel habits  : NEGATIVE for unusual urinary or vaginal symptoms. No vaginal bleeding.  MUSCULOSKELETAL: NEGATIVE for significant arthralgias or myalgia  NEURO: NEGATIVE for weakness, dizziness or paresthesias  PSYCHIATRIC: NEGATIVE for changes in mood or affect      She is nursing assistant at [employer]       OBJECTIVE:   /50 (BP Location: Left arm, Patient Position: Chair, Cuff Size: Adult Large)   Pulse 74   Temp 98.4  F (36.9  C) (Oral)   Resp 20   Ht 1.613 m (5' 3.5\")   Wt 84.8 kg (187 lb)   LMP 12/06/2005   SpO2 97%   BMI 32.61 kg/m       Blood pressure 114/72  Physical Exam  GENERAL APPEARANCE: alert and no distress  EYES: Eyes grossly normal to inspection, and conjunctivae and sclerae normal  HENT: ear canals and TM's normal, nose and mouth without ulcers or lesions, oropharynx clear and oral mucous membranes moist  NECK: no adenopathy, no asymmetry, masses, or scars and thyroid normal to palpation  RESP: lungs clear to auscultation - no rales, rhonchi or wheezes  BREAST: large without masses, tenderness or nipple discharge and no palpable axillary masses or adenopathy  CV: regular rate and rhythm, normal S1 S2, no S3 or S4, no murmur, click or rub, no peripheral edema and peripheral pulses strong  ABDOMEN: soft, nontender, no hepatosplenomegaly, no masses and bowel sounds normal  MS: no musculoskeletal defects are noted and gait is age appropriate without ataxia  SKIN: no suspicious lesions or rashes  NEURO: Normal strength and tone, sensory exam grossly normal, mentation intact and speech normal  PSYCH: mentation appears normal and affect normal/bright    Diagnostic Test Results:  Lab     ASSESSMENT/PLAN: " "  1. Encounter for general adult medical examination with abnormal findings    - Lipid panel reflex to direct LDL Fasting; Future  - Comprehensive metabolic panel; Future  - TSH with free T4 reflex; Future  - CBC with platelets and differential; Future    2. Benign essential hypertension  In good range   - Comprehensive metabolic panel; Future  - TSH with free T4 reflex; Future  - CBC with platelets and differential; Future  - diltiazem ER COATED BEADS (CARDIZEM CD) 360 MG 24 hr capsule; Take 1 capsule (360 mg) by mouth daily  Dispense: 30 capsule; Refill: 11  - lisinopril (PRINIVIL/ZESTRIL) 10 MG tablet; Take 1 tablet (10 mg) by mouth daily  Dispense: 30 tablet; Refill: 11  - Albumin Random Urine Quantitative with Creat Ratio; Future    3. Pure hyperglyceridemia    - Lipid panel reflex to direct LDL Fasting; Future  - Comprehensive metabolic panel; Future    4. Encounter for screening mammogram for breast cancer    - *MA Screening Digital Bilateral; Future    5. Encounter for screening for HIV  Once in lifetime   - HIV Antigen Antibody Combo; Future    COUNSELING:  Reviewed preventive health counseling, as reflected in patient instructions       Regular exercise       Healthy diet/nutrition       Osteoporosis Prevention/Bone Health    BP Readings from Last 1 Encounters:   04/16/19 102/50     Estimated body mass index is 32.61 kg/m  as calculated from the following:    Height as of this encounter: 1.613 m (5' 3.5\").    Weight as of this encounter: 84.8 kg (187 lb).           reports that she has never smoked. She has never used smokeless tobacco.      Counseling Resources:  ATP IV Guidelines  Pooled Cohorts Equation Calculator  Breast Cancer Risk Calculator  FRAX Risk Assessment  ICSI Preventive Guidelines  Dietary Guidelines for Americans, 2010  USDA's MyPlate  ASA Prophylaxis  Lung CA Screening    WADE Dillon CNP  [facility]  "

## 2019-04-16 NOTE — NURSING NOTE
"Chief Complaint   Patient presents with     Physical     non fasting, med review.     initial /50 (BP Location: Left arm, Patient Position: Chair, Cuff Size: Adult Large)   Pulse 74   Temp 98.4  F (36.9  C) (Oral)   Resp 20   Ht 1.613 m (5' 3.5\")   Wt 84.8 kg (187 lb)   LMP 12/06/2005   SpO2 97%   BMI 32.61 kg/m   Estimated body mass index is 32.61 kg/m  as calculated from the following:    Height as of this encounter: 1.613 m (5' 3.5\").    Weight as of this encounter: 84.8 kg (187 lb)..  bp completed using cuff size large    "

## 2019-04-17 ENCOUNTER — HOSPITAL ENCOUNTER (OUTPATIENT)
Dept: MAMMOGRAPHY | Facility: CLINIC | Age: 49
Discharge: HOME | End: 2019-04-17
Attending: NURSE PRACTITIONER | Admitting: NURSE PRACTITIONER
Payer: COMMERCIAL

## 2019-04-17 DIAGNOSIS — Z12.31 ENCOUNTER FOR SCREENING MAMMOGRAM FOR BREAST CANCER: ICD-10-CM

## 2019-04-17 PROCEDURE — 77063 BREAST TOMOSYNTHESIS BI: CPT

## 2019-04-25 DIAGNOSIS — E78.1 PURE HYPERGLYCERIDEMIA: ICD-10-CM

## 2019-04-25 DIAGNOSIS — Z00.01 ENCOUNTER FOR GENERAL ADULT MEDICAL EXAMINATION WITH ABNORMAL FINDINGS: ICD-10-CM

## 2019-04-25 DIAGNOSIS — Z11.4 ENCOUNTER FOR SCREENING FOR HIV: ICD-10-CM

## 2019-04-25 DIAGNOSIS — I10 BENIGN ESSENTIAL HYPERTENSION: ICD-10-CM

## 2019-04-25 LAB
ALBUMIN SERPL-MCNC: 3.8 G/DL (ref 3.4–5)
ALP SERPL-CCNC: 27 U/L (ref 40–150)
ALT SERPL W P-5'-P-CCNC: 22 U/L (ref 0–50)
ANION GAP SERPL CALCULATED.3IONS-SCNC: 7 MMOL/L (ref 3–14)
AST SERPL W P-5'-P-CCNC: 12 U/L (ref 0–45)
BASOPHILS # BLD AUTO: 0 10E9/L (ref 0–0.2)
BASOPHILS NFR BLD AUTO: 0.5 %
BILIRUB SERPL-MCNC: 0.3 MG/DL (ref 0.2–1.3)
BUN SERPL-MCNC: 9 MG/DL (ref 7–30)
CALCIUM SERPL-MCNC: 9.7 MG/DL (ref 8.5–10.1)
CHLORIDE SERPL-SCNC: 104 MMOL/L (ref 94–109)
CHOLEST SERPL-MCNC: 206 MG/DL
CO2 SERPL-SCNC: 25 MMOL/L (ref 20–32)
CREAT SERPL-MCNC: 0.32 MG/DL (ref 0.52–1.04)
DIFFERENTIAL METHOD BLD: NORMAL
EOSINOPHIL # BLD AUTO: 0.2 10E9/L (ref 0–0.7)
EOSINOPHIL NFR BLD AUTO: 1.9 %
ERYTHROCYTE [DISTWIDTH] IN BLOOD BY AUTOMATED COUNT: 12.2 % (ref 10–15)
GFR SERPL CREATININE-BSD FRML MDRD: >90 ML/MIN/{1.73_M2}
GLUCOSE SERPL-MCNC: 104 MG/DL (ref 70–99)
HCT VFR BLD AUTO: 40.9 % (ref 35–47)
HDLC SERPL-MCNC: 48 MG/DL
HGB BLD-MCNC: 13.9 G/DL (ref 11.7–15.7)
LDLC SERPL CALC-MCNC: 116 MG/DL
LYMPHOCYTES # BLD AUTO: 1.6 10E9/L (ref 0.8–5.3)
LYMPHOCYTES NFR BLD AUTO: 18.9 %
MCH RBC QN AUTO: 30.9 PG (ref 26.5–33)
MCHC RBC AUTO-ENTMCNC: 34 G/DL (ref 31.5–36.5)
MCV RBC AUTO: 91 FL (ref 78–100)
MONOCYTES # BLD AUTO: 0.7 10E9/L (ref 0–1.3)
MONOCYTES NFR BLD AUTO: 7.7 %
NEUTROPHILS # BLD AUTO: 6.1 10E9/L (ref 1.6–8.3)
NEUTROPHILS NFR BLD AUTO: 71 %
NONHDLC SERPL-MCNC: 158 MG/DL
PLATELET # BLD AUTO: 270 10E9/L (ref 150–450)
POTASSIUM SERPL-SCNC: 4.5 MMOL/L (ref 3.4–5.3)
PROT SERPL-MCNC: 7.1 G/DL (ref 6.8–8.8)
RBC # BLD AUTO: 4.5 10E12/L (ref 3.8–5.2)
SODIUM SERPL-SCNC: 136 MMOL/L (ref 133–144)
TRIGL SERPL-MCNC: 211 MG/DL
TSH SERPL DL<=0.005 MIU/L-ACNC: 1.98 MU/L (ref 0.4–4)
WBC # BLD AUTO: 8.6 10E9/L (ref 4–11)

## 2019-04-25 PROCEDURE — 85025 COMPLETE CBC W/AUTO DIFF WBC: CPT | Performed by: NURSE PRACTITIONER

## 2019-04-25 PROCEDURE — 80053 COMPREHEN METABOLIC PANEL: CPT | Performed by: NURSE PRACTITIONER

## 2019-04-25 PROCEDURE — 87389 HIV-1 AG W/HIV-1&-2 AB AG IA: CPT | Performed by: NURSE PRACTITIONER

## 2019-04-25 PROCEDURE — 84443 ASSAY THYROID STIM HORMONE: CPT | Performed by: NURSE PRACTITIONER

## 2019-04-25 PROCEDURE — 82043 UR ALBUMIN QUANTITATIVE: CPT | Performed by: NURSE PRACTITIONER

## 2019-04-25 PROCEDURE — 36415 COLL VENOUS BLD VENIPUNCTURE: CPT | Performed by: NURSE PRACTITIONER

## 2019-04-25 PROCEDURE — 80061 LIPID PANEL: CPT | Performed by: NURSE PRACTITIONER

## 2019-04-26 LAB
CREAT UR-MCNC: 21 MG/DL
HIV 1+2 AB+HIV1 P24 AG SERPL QL IA: NONREACTIVE
MICROALBUMIN UR-MCNC: 8 MG/L
MICROALBUMIN/CREAT UR: 37.08 MG/G CR (ref 0–25)

## 2019-05-30 ENCOUNTER — HOSPITAL ENCOUNTER (INPATIENT)
Facility: CLINIC | Age: 49
LOS: 4 days | Discharge: HOME | DRG: 872 | End: 2019-06-03
Attending: EMERGENCY MEDICINE | Admitting: INTERNAL MEDICINE
Payer: COMMERCIAL

## 2019-05-30 ENCOUNTER — APPOINTMENT (OUTPATIENT)
Dept: GENERAL RADIOLOGY | Facility: CLINIC | Age: 49
DRG: 872 | End: 2019-05-30
Attending: EMERGENCY MEDICINE
Payer: COMMERCIAL

## 2019-05-30 DIAGNOSIS — N39.0 URINARY TRACT INFECTION WITHOUT HEMATURIA, SITE UNSPECIFIED: ICD-10-CM

## 2019-05-30 DIAGNOSIS — A41.50 SEPSIS DUE TO GRAM-NEGATIVE UTI (H): ICD-10-CM

## 2019-05-30 DIAGNOSIS — A41.9 SEPTIC SHOCK (H): ICD-10-CM

## 2019-05-30 DIAGNOSIS — A49.8 INFECTION DUE TO ESBL-PRODUCING ESCHERICHIA COLI: Primary | ICD-10-CM

## 2019-05-30 DIAGNOSIS — A41.51 SEPSIS DUE TO ESCHERICHIA COLI (H): ICD-10-CM

## 2019-05-30 DIAGNOSIS — Z16.12 INFECTION DUE TO ESBL-PRODUCING ESCHERICHIA COLI: Primary | ICD-10-CM

## 2019-05-30 DIAGNOSIS — R00.0 SINUS TACHYCARDIA: ICD-10-CM

## 2019-05-30 DIAGNOSIS — R65.21 SEPTIC SHOCK (H): ICD-10-CM

## 2019-05-30 DIAGNOSIS — A41.51 E. COLI SEPSIS (H): ICD-10-CM

## 2019-05-30 DIAGNOSIS — N39.0 SEPSIS DUE TO GRAM-NEGATIVE UTI (H): ICD-10-CM

## 2019-05-30 LAB
ALBUMIN UR-MCNC: 50 MG/DL
ANION GAP SERPL CALCULATED.3IONS-SCNC: 13 MMOL/L (ref 3–14)
APPEARANCE UR: ABNORMAL
BACTERIA #/AREA URNS HPF: ABNORMAL /HPF
BASOPHILS # BLD AUTO: 0 10E9/L (ref 0–0.2)
BASOPHILS NFR BLD AUTO: 1 %
BILIRUB UR QL STRIP: NEGATIVE
BUN SERPL-MCNC: 14 MG/DL (ref 7–30)
CALCIUM SERPL-MCNC: 9.7 MG/DL (ref 8.5–10.1)
CHLORIDE SERPL-SCNC: 104 MMOL/L (ref 94–109)
CO2 BLDCOV-SCNC: 21 MMOL/L (ref 21–28)
CO2 SERPL-SCNC: 20 MMOL/L (ref 20–32)
COLOR UR AUTO: YELLOW
CREAT SERPL-MCNC: 0.47 MG/DL (ref 0.52–1.04)
DIFFERENTIAL METHOD BLD: ABNORMAL
DOHLE BOD BLD QL SMEAR: PRESENT
EOSINOPHIL # BLD AUTO: 0 10E9/L (ref 0–0.7)
EOSINOPHIL NFR BLD AUTO: 0 %
ERYTHROCYTE [DISTWIDTH] IN BLOOD BY AUTOMATED COUNT: 12.3 % (ref 10–15)
GFR SERPL CREATININE-BSD FRML MDRD: >90 ML/MIN/{1.73_M2}
GLUCOSE SERPL-MCNC: 191 MG/DL (ref 70–99)
GLUCOSE UR STRIP-MCNC: NEGATIVE MG/DL
HCT VFR BLD AUTO: 39.5 % (ref 35–47)
HGB BLD-MCNC: 13.5 G/DL (ref 11.7–15.7)
HGB UR QL STRIP: ABNORMAL
INTERPRETATION ECG - MUSE: NORMAL
KETONES UR STRIP-MCNC: 10 MG/DL
LACTATE BLD-SCNC: 1.9 MMOL/L (ref 0.7–2)
LACTATE BLD-SCNC: 3.9 MMOL/L (ref 0.7–2.1)
LACTATE SERPL-SCNC: 4 MMOL/L (ref 0.4–2)
LEUKOCYTE ESTERASE UR QL STRIP: ABNORMAL
LYMPHOCYTES # BLD AUTO: 0.5 10E9/L (ref 0.8–5.3)
LYMPHOCYTES NFR BLD AUTO: 13 %
MAGNESIUM SERPL-MCNC: 1.9 MG/DL (ref 1.6–2.3)
MCH RBC QN AUTO: 31.4 PG (ref 26.5–33)
MCHC RBC AUTO-ENTMCNC: 34.2 G/DL (ref 31.5–36.5)
MCV RBC AUTO: 92 FL (ref 78–100)
METAMYELOCYTES # BLD: 0 10E9/L
METAMYELOCYTES NFR BLD MANUAL: 1 %
MONOCYTES # BLD AUTO: 0 10E9/L (ref 0–1.3)
MONOCYTES NFR BLD AUTO: 0 %
MUCOUS THREADS #/AREA URNS LPF: PRESENT /LPF
NEUTROPHILS # BLD AUTO: 3.2 10E9/L (ref 1.6–8.3)
NEUTROPHILS NFR BLD AUTO: 85 %
NITRATE UR QL: POSITIVE
PCO2 BLDV: 29 MM HG (ref 40–50)
PH BLDV: 7.48 PH (ref 7.32–7.43)
PH UR STRIP: 5.5 PH (ref 5–7)
PLATELET # BLD AUTO: 175 10E9/L (ref 150–450)
PLATELET # BLD EST: ABNORMAL 10*3/UL
PO2 BLDV: 62 MM HG (ref 25–47)
POTASSIUM SERPL-SCNC: 3.9 MMOL/L (ref 3.4–5.3)
RBC # BLD AUTO: 4.3 10E12/L (ref 3.8–5.2)
RBC #/AREA URNS AUTO: 18 /HPF (ref 0–2)
RBC MORPH BLD: ABNORMAL
SAO2 % BLDV FROM PO2: 93 %
SODIUM SERPL-SCNC: 137 MMOL/L (ref 133–144)
SOURCE: ABNORMAL
SP GR UR STRIP: 1.02 (ref 1–1.03)
SQUAMOUS #/AREA URNS AUTO: 3 /HPF (ref 0–1)
TOXIC GRANULES BLD QL SMEAR: PRESENT
TROPONIN I SERPL-MCNC: <0.015 UG/L (ref 0–0.04)
TSH SERPL DL<=0.005 MIU/L-ACNC: 1.54 MU/L (ref 0.4–4)
UROBILINOGEN UR STRIP-MCNC: NORMAL MG/DL (ref 0–2)
WBC # BLD AUTO: 3.8 10E9/L (ref 4–11)
WBC #/AREA URNS AUTO: >182 /HPF (ref 0–5)
WBC CLUMPS #/AREA URNS HPF: PRESENT /HPF

## 2019-05-30 PROCEDURE — 80048 BASIC METABOLIC PNL TOTAL CA: CPT | Performed by: EMERGENCY MEDICINE

## 2019-05-30 PROCEDURE — 84484 ASSAY OF TROPONIN QUANT: CPT | Performed by: EMERGENCY MEDICINE

## 2019-05-30 PROCEDURE — 83605 ASSAY OF LACTIC ACID: CPT | Performed by: EMERGENCY MEDICINE

## 2019-05-30 PROCEDURE — 25800030 ZZH RX IP 258 OP 636: Performed by: EMERGENCY MEDICINE

## 2019-05-30 PROCEDURE — 87186 SC STD MICRODIL/AGAR DIL: CPT | Performed by: EMERGENCY MEDICINE

## 2019-05-30 PROCEDURE — 99223 1ST HOSP IP/OBS HIGH 75: CPT | Mod: AI | Performed by: INTERNAL MEDICINE

## 2019-05-30 PROCEDURE — 25800030 ZZH RX IP 258 OP 636: Performed by: PHYSICIAN ASSISTANT

## 2019-05-30 PROCEDURE — 25000132 ZZH RX MED GY IP 250 OP 250 PS 637: Performed by: EMERGENCY MEDICINE

## 2019-05-30 PROCEDURE — 36415 COLL VENOUS BLD VENIPUNCTURE: CPT | Performed by: EMERGENCY MEDICINE

## 2019-05-30 PROCEDURE — 96365 THER/PROPH/DIAG IV INF INIT: CPT

## 2019-05-30 PROCEDURE — 96366 THER/PROPH/DIAG IV INF ADDON: CPT

## 2019-05-30 PROCEDURE — 71046 X-RAY EXAM CHEST 2 VIEWS: CPT

## 2019-05-30 PROCEDURE — 87077 CULTURE AEROBIC IDENTIFY: CPT | Performed by: EMERGENCY MEDICINE

## 2019-05-30 PROCEDURE — 87040 BLOOD CULTURE FOR BACTERIA: CPT | Performed by: EMERGENCY MEDICINE

## 2019-05-30 PROCEDURE — 83735 ASSAY OF MAGNESIUM: CPT | Performed by: EMERGENCY MEDICINE

## 2019-05-30 PROCEDURE — 83605 ASSAY OF LACTIC ACID: CPT

## 2019-05-30 PROCEDURE — 85025 COMPLETE CBC W/AUTO DIFF WBC: CPT | Performed by: EMERGENCY MEDICINE

## 2019-05-30 PROCEDURE — 81001 URINALYSIS AUTO W/SCOPE: CPT | Performed by: EMERGENCY MEDICINE

## 2019-05-30 PROCEDURE — 96361 HYDRATE IV INFUSION ADD-ON: CPT

## 2019-05-30 PROCEDURE — 12000000 ZZH R&B MED SURG/OB

## 2019-05-30 PROCEDURE — 25000128 H RX IP 250 OP 636: Performed by: EMERGENCY MEDICINE

## 2019-05-30 PROCEDURE — 25000128 H RX IP 250 OP 636: Performed by: PHYSICIAN ASSISTANT

## 2019-05-30 PROCEDURE — 82803 BLOOD GASES ANY COMBINATION: CPT

## 2019-05-30 PROCEDURE — 99285 EMERGENCY DEPT VISIT HI MDM: CPT | Mod: 25

## 2019-05-30 PROCEDURE — 87086 URINE CULTURE/COLONY COUNT: CPT | Performed by: EMERGENCY MEDICINE

## 2019-05-30 PROCEDURE — 87800 DETECT AGNT MULT DNA DIREC: CPT | Performed by: EMERGENCY MEDICINE

## 2019-05-30 PROCEDURE — 93005 ELECTROCARDIOGRAM TRACING: CPT

## 2019-05-30 PROCEDURE — 84443 ASSAY THYROID STIM HORMONE: CPT | Performed by: EMERGENCY MEDICINE

## 2019-05-30 RX ORDER — IBUPROFEN 600 MG/1
600 TABLET, FILM COATED ORAL ONCE
Status: COMPLETED | OUTPATIENT
Start: 2019-05-30 | End: 2019-05-30

## 2019-05-30 RX ORDER — POLYETHYLENE GLYCOL 3350 17 G/17G
17 POWDER, FOR SOLUTION ORAL DAILY PRN
Status: DISCONTINUED | OUTPATIENT
Start: 2019-05-30 | End: 2019-06-03 | Stop reason: HOSPADM

## 2019-05-30 RX ORDER — SODIUM CHLORIDE 9 MG/ML
INJECTION, SOLUTION INTRAVENOUS CONTINUOUS
Status: DISCONTINUED | OUTPATIENT
Start: 2019-05-30 | End: 2019-05-31

## 2019-05-30 RX ORDER — ONDANSETRON 4 MG/1
4 TABLET, ORALLY DISINTEGRATING ORAL EVERY 6 HOURS PRN
Status: DISCONTINUED | OUTPATIENT
Start: 2019-05-30 | End: 2019-06-03 | Stop reason: HOSPADM

## 2019-05-30 RX ORDER — POTASSIUM CHLORIDE 29.8 MG/ML
20 INJECTION INTRAVENOUS
Status: DISCONTINUED | OUTPATIENT
Start: 2019-05-30 | End: 2019-06-03 | Stop reason: HOSPADM

## 2019-05-30 RX ORDER — ACETAMINOPHEN 500 MG
500 TABLET ORAL EVERY 4 HOURS PRN
Status: DISCONTINUED | OUTPATIENT
Start: 2019-05-30 | End: 2019-05-30

## 2019-05-30 RX ORDER — POTASSIUM CHLORIDE 7.45 MG/ML
10 INJECTION INTRAVENOUS
Status: DISCONTINUED | OUTPATIENT
Start: 2019-05-30 | End: 2019-06-03 | Stop reason: HOSPADM

## 2019-05-30 RX ORDER — ACETAMINOPHEN 325 MG/1
650 TABLET ORAL EVERY 4 HOURS PRN
Status: DISCONTINUED | OUTPATIENT
Start: 2019-05-30 | End: 2019-06-03 | Stop reason: HOSPADM

## 2019-05-30 RX ORDER — LIDOCAINE 40 MG/G
CREAM TOPICAL
Status: DISCONTINUED | OUTPATIENT
Start: 2019-05-30 | End: 2019-06-03 | Stop reason: HOSPADM

## 2019-05-30 RX ORDER — POTASSIUM CL/LIDO/0.9 % NACL 10MEQ/0.1L
10 INTRAVENOUS SOLUTION, PIGGYBACK (ML) INTRAVENOUS
Status: DISCONTINUED | OUTPATIENT
Start: 2019-05-30 | End: 2019-06-03 | Stop reason: HOSPADM

## 2019-05-30 RX ORDER — NALOXONE HYDROCHLORIDE 0.4 MG/ML
.1-.4 INJECTION, SOLUTION INTRAMUSCULAR; INTRAVENOUS; SUBCUTANEOUS
Status: DISCONTINUED | OUTPATIENT
Start: 2019-05-30 | End: 2019-06-03 | Stop reason: HOSPADM

## 2019-05-30 RX ORDER — PROCHLORPERAZINE MALEATE 10 MG
10 TABLET ORAL EVERY 6 HOURS PRN
Status: DISCONTINUED | OUTPATIENT
Start: 2019-05-30 | End: 2019-06-03 | Stop reason: HOSPADM

## 2019-05-30 RX ORDER — PROCHLORPERAZINE 25 MG
25 SUPPOSITORY, RECTAL RECTAL EVERY 12 HOURS PRN
Status: DISCONTINUED | OUTPATIENT
Start: 2019-05-30 | End: 2019-06-03 | Stop reason: HOSPADM

## 2019-05-30 RX ORDER — CEFTRIAXONE 2 G/1
2 INJECTION, POWDER, FOR SOLUTION INTRAMUSCULAR; INTRAVENOUS EVERY 24 HOURS
Status: DISCONTINUED | OUTPATIENT
Start: 2019-05-30 | End: 2019-05-31

## 2019-05-30 RX ORDER — POTASSIUM CHLORIDE 1500 MG/1
20-40 TABLET, EXTENDED RELEASE ORAL
Status: DISCONTINUED | OUTPATIENT
Start: 2019-05-30 | End: 2019-06-03 | Stop reason: HOSPADM

## 2019-05-30 RX ORDER — ONDANSETRON 2 MG/ML
4 INJECTION INTRAMUSCULAR; INTRAVENOUS EVERY 6 HOURS PRN
Status: DISCONTINUED | OUTPATIENT
Start: 2019-05-30 | End: 2019-06-03 | Stop reason: HOSPADM

## 2019-05-30 RX ORDER — POTASSIUM CHLORIDE 1.5 G/1.58G
20-40 POWDER, FOR SOLUTION ORAL
Status: DISCONTINUED | OUTPATIENT
Start: 2019-05-30 | End: 2019-06-03 | Stop reason: HOSPADM

## 2019-05-30 RX ORDER — AMOXICILLIN 250 MG
1 CAPSULE ORAL 2 TIMES DAILY PRN
Status: DISCONTINUED | OUTPATIENT
Start: 2019-05-30 | End: 2019-06-03 | Stop reason: HOSPADM

## 2019-05-30 RX ORDER — IBUPROFEN 600 MG/1
600 TABLET, FILM COATED ORAL EVERY 6 HOURS PRN
Status: DISCONTINUED | OUTPATIENT
Start: 2019-05-30 | End: 2019-06-03 | Stop reason: HOSPADM

## 2019-05-30 RX ORDER — MAGNESIUM SULFATE HEPTAHYDRATE 40 MG/ML
4 INJECTION, SOLUTION INTRAVENOUS EVERY 4 HOURS PRN
Status: DISCONTINUED | OUTPATIENT
Start: 2019-05-30 | End: 2019-06-03 | Stop reason: HOSPADM

## 2019-05-30 RX ORDER — DILTIAZEM HYDROCHLORIDE 180 MG/1
360 CAPSULE, COATED, EXTENDED RELEASE ORAL DAILY
Status: DISCONTINUED | OUTPATIENT
Start: 2019-05-31 | End: 2019-06-03 | Stop reason: HOSPADM

## 2019-05-30 RX ORDER — AMOXICILLIN 250 MG
2 CAPSULE ORAL 2 TIMES DAILY PRN
Status: DISCONTINUED | OUTPATIENT
Start: 2019-05-30 | End: 2019-06-03 | Stop reason: HOSPADM

## 2019-05-30 RX ADMIN — SODIUM CHLORIDE, POTASSIUM CHLORIDE, SODIUM LACTATE AND CALCIUM CHLORIDE: 600; 310; 30; 20 INJECTION, SOLUTION INTRAVENOUS at 08:46

## 2019-05-30 RX ADMIN — SODIUM CHLORIDE, PRESERVATIVE FREE: 5 INJECTION INTRAVENOUS at 22:17

## 2019-05-30 RX ADMIN — SODIUM CHLORIDE, POTASSIUM CHLORIDE, SODIUM LACTATE AND CALCIUM CHLORIDE 1000 ML: 600; 310; 30; 20 INJECTION, SOLUTION INTRAVENOUS at 06:30

## 2019-05-30 RX ADMIN — ACETAMINOPHEN 500 MG: 500 TABLET, FILM COATED ORAL at 06:15

## 2019-05-30 RX ADMIN — TAZOBACTAM SODIUM AND PIPERACILLIN SODIUM 4.5 G: 500; 4 INJECTION, SOLUTION INTRAVENOUS at 06:56

## 2019-05-30 RX ADMIN — SODIUM CHLORIDE, POTASSIUM CHLORIDE, SODIUM LACTATE AND CALCIUM CHLORIDE 1000 ML: 600; 310; 30; 20 INJECTION, SOLUTION INTRAVENOUS at 06:15

## 2019-05-30 RX ADMIN — IBUPROFEN 600 MG: 600 TABLET ORAL at 06:15

## 2019-05-30 RX ADMIN — CEFTRIAXONE SODIUM 2 G: 2 INJECTION, POWDER, FOR SOLUTION INTRAMUSCULAR; INTRAVENOUS at 12:09

## 2019-05-30 RX ADMIN — ENOXAPARIN SODIUM 40 MG: 40 INJECTION SUBCUTANEOUS at 12:09

## 2019-05-30 RX ADMIN — SODIUM CHLORIDE, PRESERVATIVE FREE: 5 INJECTION INTRAVENOUS at 10:47

## 2019-05-30 ASSESSMENT — ENCOUNTER SYMPTOMS
DIARRHEA: 0
DYSURIA: 0
VOMITING: 1
ABDOMINAL PAIN: 1
FEVER: 1
APPETITE CHANGE: 1
FREQUENCY: 0
CHILLS: 1
HEMATURIA: 0
FATIGUE: 1

## 2019-05-30 ASSESSMENT — ACTIVITIES OF DAILY LIVING (ADL)
ADLS_ACUITY_SCORE: 12
ADLS_ACUITY_SCORE: 12
ADLS_ACUITY_SCORE: 16

## 2019-05-30 NOTE — H&P
History and Physical     Amalia Newman MRN# [id number]   YOB: 1970 Age: 49 year old      Date of Admission:  5/30/2019    Primary care provider: Amina Olivares          Assessment and Plan:   Amalia Newman is a 49 year old female with a PMH significant for tachycardia and hypertension who presents with nausea, anorexia and chills.    Patient was discussed with Dr. Dumas, who was provider in ED. Chart review of ED work up was reviewed as well as chart review of Care Everywhere, previous visits and admissions.  Given Zosyn in ED as well as 2-1/2 L of fluid for concern of sepsis and elevated lactic acid which is now normal.    1.  Urosepsis  Patient presents with nausea, intermittent rigors, anorexia, lightheadedness/dizziness and overall fatigue.  On arrival she was tachycardic and febrile but not hypotensive.  WBC is low at 3.8 but lactic acid was elevated to 4.0 but normalized after 2.5 L of fluid.  UA was obtained quickly which showed a large amount of white blood cells and positive for nitrites with leukocyte esterase so Zosyn was started.  Urine was sent for culture and blood cultures were obtained.  Chest x-ray is clear of infection.  Her symptoms are much improved after receiving fluids and likely the source of infection is from her urine.  -Continue normal saline at 100 mL/h  -Tylenol available for fever  -Follow urine and blood cultures  -Continue with Rocephin 2 g every 24 hours  -We will have IV nausea medications available      2.  Tachycardia  Baseline it sounds like she has tachycardia which her primary care doctor started her on diltiazem not only to control the tachycardia but also to control her blood pressure.  She does not feel palpitations or chest discomfort.  Initial EKG showed a sinus tachycardia with rate of 149, her rate is now in the 110 range after fluids and improvement of her fever.  -Monitor on telemetry  -TSH was normal in April 2019  -Continue oral  diltiazem if systolic pressure is greater than 120  -Continue IV fluids as above    3.  Hypertension  -Continue PTA diltiazem on parameters but hold lisinopril in the setting of urosepsis              Social: No concerns  Code: Discussed with patient and they have chosen Full code  VTE prophylaxis: Lovenox  Disposition: Inpatient                     Chief Complaint:   Sepsis due to urinary source         History of Present Illness:   Amalia Newman is a 49 year old female who presents with nausea, chills, lower abdominal pain and anorexia that started on 5/28.  She has been taking care of her ill mother who was recently discharged after UTI and states this has been stressful for her.  She did not think much of her symptoms but they have continued to worsen and she is not able to keep anything down.  She has felt rigors intermittently but no documented fever.  She has felt lightheaded and dizzy but this is better now after fluids.  She denies dysuria, hematuria and diarrhea.  She does not have a severe headache, cough or shortness of breath.  She has not been ill recently or on antibiotics.  She does not drink alcohol or smoke cigarettes.  She has not traveled out of the country recently and other than her mother has no sick contacts.             Past Medical History:     Past Medical History:   Diagnosis Date     Essential hypertension, benign 1996     Pure hyperglyceridemia      Tachycardia                Past Surgical History:     Past Surgical History:   Procedure Laterality Date     DILATION AND CURETTAGE  1996     ENT SURGERY  2013    wisdom teeth      HYSTERECTOMY RADICAL  2006    open hysterectomy ; ovaries are left in                Social History:     Social History     Socioeconomic History     Marital status:      Spouse name: Not on file     Number of children: 0     Years of education: Not on file     Highest education level: Not on file   Occupational History     Employer: [employer]   Social Needs     Financial resource strain: Not on file     Food insecurity:     Worry: Not on file     Inability: Not on file     Transportation needs:     Medical: Not on file     Non-medical: Not on file   Tobacco Use     Smoking status: Never Smoker     Smokeless tobacco: Never Used   Substance and Sexual Activity     Alcohol use: Yes     Comment: \"very rare\"     Drug use: No     Sexual activity: Yes     Partners: Male   Lifestyle     Physical activity:     Days per week: Not on file     Minutes per session: Not on file     Stress: Not on file   Relationships     Social connections:     Talks on phone: Not on file     Gets together: Not on file     Attends Baptism service: Not on file     Active member of club or organization: Not on file     Attends meetings of clubs or organizations: Not on file     Relationship status: Not on file     Intimate partner violence:     Fear of current or ex partner: Not on file     Emotionally abused: Not on file     Physically abused: Not on file     Forced sexual activity: Not on file   Other Topics Concern      Service Not Asked     Blood Transfusions Not Asked     Caffeine Concern Not Asked     Occupational Exposure Not Asked     Hobby Hazards Not Asked     Sleep Concern Not Asked     Stress Concern Not Asked     Weight Concern Not Asked     Special Diet Not Asked     Back Care Not Asked     Exercise Yes     Bike Helmet Not Asked     Seat Belt Yes     Self-Exams No     Parent/sibling w/ CABG, MI or angioplasty before 65F 55M? Not Asked   Social History Narrative     Not on file               Family History:     Family History   Problem Relation Age of Onset     Hypertension Mother      Lipids Mother      Cerebrovascular Disease Mother      C.A.D. Father         58     Hypertension Father      Diabetes Father      Vascular Disease Father         PVD     Hypertension Brother      Cancer Paternal Grandmother         Abdominal     Cancer Paternal Grandfather  "        Liver     Hypertension Maternal Grandmother               Allergies:      Allergies   Allergen Reactions     No Known Allergies      Vicodin [Hydrocodone-Acetaminophen]      Angry, changes mood.                Medications:     Prior to Admission medications    Medication Sig Last Dose Taking? Auth Provider   ascorbic acid (VITAMIN C) 500 MG tablet Take 2 tablets (1,000 mg) by mouth daily Past Week at Unknown time Yes Amina Olivares APRN CNP   diltiazem ER COATED BEADS (CARDIZEM CD) 360 MG 24 hr capsule Take 1 capsule (360 mg) by mouth daily 5/30/2019 at am Yes Amina Olivares APRN CNP   lisinopril (PRINIVIL/ZESTRIL) 10 MG tablet Take 1 tablet (10 mg) by mouth daily 5/30/2019 at am Yes Amina Olivares APRN CNP   Ubiquinol (QUNOL COQ10/UBIQUINOL/ANDRES) 100 MG CAPS Take 100 mg by mouth daily  Past Week at Unknown time Yes Amina Olivares APRN CNP              Review of Systems:   A Comprehensive greater than 10 system review of systems was carried out.  Pertinent positives and negatives are noted above.  Otherwise negative for contributory information.            Physical Exam:   Blood pressure 115/65, pulse 113, temperature 102.8  F (39.3  C), resp. rate 22, weight 83 kg (182 lb 15.7 oz), last menstrual period 12/06/2005, SpO2 94 %, not currently breastfeeding.  Exam:  GENERAL:  Comfortable.  PSYCH: pleasant, oriented, No acute distress.  HEENT:  PERRLA. Normal conjunctiva, normal hearing, nasal mucosa and Oropharynx are normal.  NECK:  Supple, no neck vein distention, adenopathy or bruits, normal thyroid.  HEART:  Normal S1, S2 with no murmur, no pericardial rub, gallops or S3 or S4.  LUNGS:  Clear to auscultation, normal Respiratory effort. No wheezing, rales or ronchi.  ABDOMEN:  Soft, no hepatosplenomegaly, normal bowel sounds. Non-tender, non distended.   EXTREMITIES:  No pedal edema, +2 pulses bilateral and equal.  SKIN:  Dry to touch, No rash, wound or ulcerations.  NEUROLOGIC:  CN  2-12 grossly intact, sensation is intact with no focal deficits.               Data:     Recent Labs   Lab 05/30/19  0612   WBC 3.8*   HGB 13.5   HCT 39.5   MCV 92        Recent Labs   Lab 05/30/19  0612      POTASSIUM 3.9   CHLORIDE 104   CO2 20   ANIONGAP 13   *   BUN 14   CR 0.47*   GFRESTIMATED >90   GFRESTBLACK >90   WEST 9.7     Recent Labs   Lab 05/30/19  0858 05/30/19  0613 05/30/19  0612   LACT 1.9 3.9* 4.0*     Recent Labs   Lab 05/30/19  0705   COLOR Yellow   APPEARANCE Slightly Cloudy   URINEGLC Negative   URINEBILI Negative   URINEKETONE 10*   SG 1.016   UBLD Moderate*   URINEPH 5.5   PROTEIN 50*   NITRITE Positive*   LEUKEST Large*   RBCU 18*   WBCU >182*         Recent Results (from the past 24 hour(s))   Chest XR,  PA & LAT    Narrative    CHEST TWO VIEWS  5/30/2019 7:30 AM     HISTORY:  Cough.    COMPARISON: None.      Impression    IMPRESSION: Lungs clear. No pleural effusions. Heart size and  pulmonary vascularity are within normal limits. Pectus excavatum.     MD Sharon ASENCIO PA-C    This patient was seen and discussed with Dr. Steen who agrees with the current plans as outlined above.

## 2019-05-30 NOTE — ED NOTES
[facility]  ED Nurse Handoff Report    Amalia Newman is a 49 year old female   ED Chief complaint: Tachycardia  . ED Diagnosis:   Final diagnoses:   Septic shock (H)   Urinary tract infection without hematuria, site unspecified   Sinus tachycardia     Allergies:   Allergies   Allergen Reactions     No Known Allergies      Vicodin [Hydrocodone-Acetaminophen]      Angry, changes mood.        Code Status: Full CodeNot Applicable  Activity level - Baseline/Home:  Independent. Activity Level - Current:   Independent. Lift room needed: No. Bariatric: No   Needed: No   Isolation: Yes. Infection:     Vital Signs:   Vitals:    05/30/19 0715 05/30/19 0721 05/30/19 0800 05/30/19 0845   BP: 133/80  127/75 111/66   Pulse: 123  114 108   Resp:       Temp:       SpO2: 96% 94% 95% 93%   Weight:           Cardiac Rhythm:  ST  Pain level:  NA  Patient confused: No. Patient Falls Risk: No.   Elimination Status: Has voided   Patient Report -Feeling shaky since last night, HR in the 160's in triage.  FocusedCognitive - Cognitive/Neuro/Behavioral WDL: -WDL except (Patient presents with tachycardia that she stated began feeling her rapid heart rate yesterday, states she has not had a cold or pain, had a 3/10 abdominal pain 5 days ago but went away, A&OX4, patient is very talkative) Level Of Consciousness:  (Patient is very talkitive Assessment: Septic workup.  Tests Performed:   Chest XR,  PA & LAT   Preliminary Result   IMPRESSION: Lungs clear. No pleural effusions. Heart size and   pulmonary vascularity are within normal limits. Pectus excavatum.        Abnormal Results:   Labs Ordered and Resulted from Time of ED Arrival Up to the Time of Departure from the ED   CBC WITH PLATELETS DIFFERENTIAL - Abnormal; Notable for the following components:       Result Value    WBC 3.8 (*)     Absolute Lymphocytes 0.5 (*)     All other components within normal limits   BASIC METABOLIC PANEL - Abnormal; Notable for the  following components:    Glucose 191 (*)     Creatinine 0.47 (*)     All other components within normal limits   LACTIC ACID - Abnormal; Notable for the following components:    Lactic Acid 4.0 (*)     All other components within normal limits   ROUTINE UA WITH MICROSCOPIC - Abnormal; Notable for the following components:    Ketones Urine 10 (*)     Blood Urine Moderate (*)     Protein Albumin Urine 50 (*)     Nitrite Urine Positive (*)     Leukocyte Esterase Urine Large (*)     WBC Urine >182 (*)     RBC Urine 18 (*)     WBC Clumps Present (*)     Bacteria Urine Many (*)     Squamous Epithelial /HPF Urine 3 (*)     Mucous Urine Present (*)     All other components within normal limits   ISTAT  GASES LACTATE CAROLIN POCT - Abnormal; Notable for the following components:    Ph Venous 7.48 (*)     PCO2 Venous 29 (*)     PO2 Venous 62 (*)     Lactic Acid 3.9 (*)     All other components within normal limits   TROPONIN I   TSH WITH FREE T4 REFLEX   LACTIC ACID WHOLE BLOOD   PERIPHERAL IV CATHETER   BLOOD CULTURE   URINE CULTURE AEROBIC BACTERIAL   BLOOD CULTURE     Treatments provided: SEE MAR  Family Comments: Aware of admission.  OBS brochure/video discussed/provided to patient:  NA  ED Medications:   Medications   acetaminophen (TYLENOL) tablet 500 mg (500 mg Oral Given 5/30/19 0615)   lactated ringers BOLUS 500 mL ( Intravenous Started 5/30/19 0846)   lactated ringers BOLUS 1,000 mL (0 mLs Intravenous Stopped 5/30/19 0846)   ibuprofen (ADVIL/MOTRIN) tablet 600 mg (600 mg Oral Given 5/30/19 0615)   lactated ringers BOLUS 1,000 mL (0 mLs Intravenous Stopped 5/30/19 0846)   piperacillin-tazobactam (ZOSYN) intermittent infusion 4.5 g (0 g Intravenous Stopped 5/30/19 0850)     Drips infusing:  No  For the majority of the shift, the patient's behavior Green. Interventions performed were NA     Severe Sepsis OR Septic Shock Diagnosis Present:   Yes    Per the ED Provider, Time Zero for severe sepsis or septic shock is:   0612    3 Hour Severe Sepsis Bundle Completion:  1. Initial Lactic Acid Result:   Recent Labs   Lab Test 05/30/19  0858 05/30/19  0613 05/30/19  0612   LACT 1.9 3.9* 4.0*     2. Blood Cultures before Antibiotics: Yes  3. Broad Spectrum Antibiotics Administered:     Anti-infectives (From now, onward)    None        4. 2500 ml of IV fluids have been given so far      6 Hour Severe Sepsis Bundle Completion:    1. Repeat Lactic Acid Level: Awaiting reesults  2. Patient currently on Vasopressors =  No      ED Nurse Name/Phone Number: Aicha MIKE Fall,   9:10 AM    RECEIVING UNIT ED HANDOFF REVIEW    Above ED Nurse Handoff Report was reviewed: Yes  Reviewed by: Nora Patrick on May 30, 2019 at 9:34 AM

## 2019-05-30 NOTE — PHARMACY-ADMISSION MEDICATION HISTORY
Admission medication history interview status for this patient is complete. See [facility] admission navigator for allergy information, prior to admission medications and immunization status.     Medication history interview source(s):Patient  Medication history resources (including written lists, pill bottles, clinic record):[facility] clinic list, [facility]  Primary pharmacy:[facility]    Changes made to PTA medication list:  Added: ---  Deleted: ----  Changed: added sig to ubiquinol per pt    Actions taken by pharmacist (provider contacted, etc):None     Additional medication history information:None    Medication reconciliation/reorder completed by provider prior to medication history? No      For patients on insulin therapy:N    Prior to Admission medications    Medication Sig Last Dose Taking? Auth Provider   ascorbic acid (VITAMIN C) 500 MG tablet Take 2 tablets (1,000 mg) by mouth daily Past Week at Unknown time Yes Amina Olivares APRN CNP   diltiazem ER COATED BEADS (CARDIZEM CD) 360 MG 24 hr capsule Take 1 capsule (360 mg) by mouth daily 5/30/2019 at am Yes Amina Olivares APRN CNP   lisinopril (PRINIVIL/ZESTRIL) 10 MG tablet Take 1 tablet (10 mg) by mouth daily 5/30/2019 at am Yes Amina Olivares APRN CNP   Ubiquinol (QUNOL COQ10/UBIQUINOL/ANDRES) 100 MG CAPS Take 100 mg by mouth daily  Past Week at Unknown time Yes Amina Olivares APRN CNP

## 2019-05-30 NOTE — ED PROVIDER NOTES
History     Chief Complaint:  Tachycardia      HPI   Amalia Newman is a 49 year old female with a history of hypertension, hyperglyceridemia, and tachycardia, who presents with tachycardia, fever, and chills, prompting her to visit the ED. The patient reports that she has been taking care of her mother with a UTI this past week, and she notes that she became upset 2 days ago due to the stress. She endorses that she developed abdominal pain and chills 2 days ago, and reports that her chills have continued since then. She describes vomiting 1 day ago, along with increased fatigue, and reports drinking less fluids than usual. She states that she took her regular medications this morning before coming into the ED, but has not taken anything for her fever. She denies any urinary symptoms or diarrhea. She also denies any recent travel.    Allergies:  Vicodin     Medications:    Lisinopril  Ubiquinol      Past Medical History:    HTN  Hyperglyceridemia  Tachycardia    Past Surgical History:    D&C  Hemet teeth  Hysterectomy    Family History:    HTN  Lipids  CVD  CAD  HTN  PVD  Cancer (abdominal and liver)    Social History:  Smoking status: no  Alcohol use: yes  Drug use: no  PCP: Amina Olivares  Marital Status:        Review of Systems   Constitutional: Positive for appetite change, chills, fatigue and fever.   Gastrointestinal: Positive for abdominal pain (Resolved) and vomiting. Negative for diarrhea.   Genitourinary: Negative for dysuria, frequency, hematuria and urgency.   All other systems reviewed and are negative.      Physical Exam     Patient Vitals for the past 24 hrs:   BP Temp Pulse Heart Rate Resp SpO2 Weight   05/30/19 0721 -- -- -- 120 -- 94 % --   05/30/19 0715 133/80 -- 123 125 -- 96 % --   05/30/19 0705 -- -- -- 130 -- 95 % --   05/30/19 0700 133/73 -- 126 -- -- -- --   05/30/19 0611 -- -- 145 -- -- -- --   05/30/19 0600 142/82 102.8  F (39.3  C) -- -- 22 97 % --   05/30/19 0554 -- --  -- -- -- -- 83 kg (182 lb 15.7 oz)       Physical Exam  Vital signs and nursing notes reviewed.     Constitutional: laying on gurney appears  comfortable  HENT: Oropharynx is clear and moist  Eyes: Conjunctivae are normal bilaterally. Pupils equal  Neck: normal range of motion  Cardiovascular: Tachycardic, no murmurs noted. Regular rhythm, normal heart sounds.   Pulmonary/Chest: Effort normal and breath sounds normal. No respiratory distress.   Abdominal: Soft. Bowel sounds are normal. No tenderness to palpation. No rebound or guarding.   Musculoskeletal: No joint swelling or edema.   Neurological: Alert and oriented. No focal weakness  Skin: Skin is warm and dry. No rash noted. No indication of cellulitis.   Psych: normal affect    Emergency Department Course   ECG:  ECG (6:00:22):  Rate 149 bpm. MO interval 112. QRS duration 72. QT/QTc 334/526. P-R-T axes 58 -2 84 . Sinus tachycardia. Nonspecific ST and T wave abnormality. Abnormal ECG.  Agree with computer interpretation. Interpreted at 0610 by Sebastien Dumas MD.    Imaging:  Radiographic findings were communicated with the patient who voiced understanding of the findings.  Chest XR, PA, & LAT  Lungs clear. No pleural effusions. Heart size and  pulmonary vascularity are within normal limits. Pectus excavatum.   As read by Radiology.    Laboratory:  TSH with free T4 reflex: 1.54  Urine culture: in process  Lactic acid (0612): 4.0 (H)  Lactic acid (0858): 1.9  Troponin 1 (0612): <0.015  BMP: Glucose 191, Creatinine 0.47 (L), o/w WNL   CBC: WBC 3.8 (L), o/w WNL (HGB 13.5, )  UA: ketones 10, blood moderate, albumin 50, nitrite positive, leukocyte esterase large, WBC >182 (H), RBC 18 (H), WBC present, bacteria many, squamous epithelial 3 (H), mucous present o/w negative  ISTAT Lactate (0613): pH 7.48 (H), pCO2 29 (L), pO2 62 (H), Bicarbonate 21, O2 sat venous (93), Lactic Acid 3.9(H)  Urine culture: in process    Interventions:  0615: LR 1L IV Bolus  0615:  Tylenol 500 mg PO  0615: Advil 600 mg PO  0630: LR 1L IV Bolus  0656: Zosyn 4.5 g IV  0846: LR 0.5L IV Bolus    Emergency Department Course:  0606: Nursing notes and vitals reviewed. I performed an exam of the patient as documented above.     IV inserted. Medicine administered as documented above. Blood drawn. This was sent to the lab for further testing, results above. The patient provided a urine sample here in the emergency department. This was sent for laboratory testing, findings above.     The patient was sent for a chest xray while in the emergency department, findings above.     0735: I rechecked the patient and discussed the results of her workup thus far.     0753:  I consulted with Dr. Miranda of the hospitalist services. They are in agreement to accept the patient for admission.    Findings and plan explained to the Patient who consents to admission. Discussed the patient with Dr. Miranda, who will admit the patient to a hospital bed for further monitoring, evaluation, and treatment.    Impression & Plan    CMS Diagnoses:The patient has signs of Septic Shock as evidenced by:    1. Presence of Sepsis, AND  2. Lactic Acid level greater than or equal to 4    Time septic shock diagnosis confirmed = 0612 as this was the time when Lactate was resulted and the level was greater than or equal to 4      3 Hour Septic Shock Bundle Completion:  1. Initial Lactic Acid Result:   Recent Labs   Lab Test 05/30/19  0613 05/30/19  0612   LACT 3.9* 4.0*     2. Blood Cultures before Antibiotics: Yes  3. Broad Spectrum Antibiotics Administered: Yes     Anti-infectives (From admission through now)    Start     Dose/Rate Route Frequency Ordered Stop    05/30/19 0640  piperacillin-tazobactam (ZOSYN) intermittent infusion 4.5 g      4.5 g  200 mL/hr over 30 Minutes Intravenous ONCE 05/30/19 0639 05/30/19 0726        4. 2500 ml of IV fluids.  Ideal body weight: 53.5 kg (118 lb 0.9 oz)  Adjusted ideal body weight: 65.3 kg (144 lb  0.4 oz)    Severe Sepsis reassessment:  1. Repeat Lactic Acid Level: 1.9  2. No vasopressors administered.   I attest to having performed a repeat sepsis exam and assessment of perfusion at 0910 and the results demonstrate improved perfusion.    Medical Decision Making:  Amalia Newman is a 49 year old female who presents to the ED for evaluation of Javi's and shakiness at home. Patient states the last couple days her appetite decreased, but other than that, she has had no symptomology. She denies chest pain, new cough, abdominal pain, back pain, or unusual rashes. On arrival, patient was known to have a sinus tachycardia in the 140s, and also a fever of 102.8. She was not hypotensive. Labs were obtained. Her white count was low, but her lactate was around 4. Chest xray does not show any obvious pneumonia. Urinalysis showed findings of a UTI. Patient was started on Zosyn initially, prior to the urine results, due to the suggestion of septic shock based on her lactate level. Her heart rate improved to 110-120 after IV fluids and antipyretics. She remained normotensive in the ED and I discussed the findings with the patient and the recommendation for admission for further evaluation and treatment. Patient was agreeable with plan. I spoke with the hospitalist service, who will accept the patient and admit her to the [facility] floor.     Critical Care time:  none    Diagnosis:    ICD-10-CM    1. Septic shock (H) A41.9     R65.21    2. Urinary tract infection without hematuria, site unspecified N39.0    3. Sinus tachycardia R00.0        Disposition:  Admitted to Shelia Sherman, am serving as a scribe at 6:06 AM on 5/30/2019 to document services personally performed by Sebastien Dumas MD based on my observations and the provider's statements to me.       Shelia Andrews  5/30/2019   [facility] EMERGENCY DEPARTMENT       Sebastien Dumas MD  05/30/19 1007

## 2019-05-30 NOTE — LETTER
Jocelynn 3, 2019      Amalia Newman  [address] [address] [address]        To Whom It May Concern:    Amalia Newman  was admitted to [facility] on 05/30/2019 and  Will be discharged later today on 06/03/2019.  Please excuse her  until 06/10/2019 due to illness.        Sincerely,            Girma Donovan MD.

## 2019-05-30 NOTE — PLAN OF CARE
Pt A&Ox4, up SBA, IV infusing. Tele SB/ST with ST depression. IV abx zosyn, rocephin. Pt very talkative and on phone frequently and asked staff to come back a few times when attempted to administer medications and complete assessments, otherwise, pt compliant with medications and tx. Denies lightheadedness, sob, pain, n/v. VSS - afebrile. continue with POC.

## 2019-05-30 NOTE — PROGRESS NOTES
Patient was seen and examined by me this morning.  History and physical completed by JEAN PAUL Arnold was discussed with Ms. Kelsey.  I agree with her assessment and plan.  Patient is a 49-year-old female who came in with symptoms of severe sepsis and septic shock based on lactic acid of 4.  Patient was resuscitated in the emergency department and follow-up perfusion exam within 2 hours at 10:30 AM by me showed evidence of good perfusion as follows.  Vital signs within normal limits with normal blood pressure and heart rate  Cardiopulmonary exam showed clear lungs bilaterally clear entry, regular rate and rhythm  Peripheral pulses present including both pedal and pretibial bilateral lower extremities  Capillary refill less than 2 seconds  Skin exam showed no evidence of mottling.

## 2019-05-31 LAB
ANION GAP SERPL CALCULATED.3IONS-SCNC: 7 MMOL/L (ref 3–14)
BUN SERPL-MCNC: 6 MG/DL (ref 7–30)
CALCIUM SERPL-MCNC: 9.5 MG/DL (ref 8.5–10.1)
CHLORIDE SERPL-SCNC: 109 MMOL/L (ref 94–109)
CO2 SERPL-SCNC: 24 MMOL/L (ref 20–32)
CREAT SERPL-MCNC: 0.31 MG/DL (ref 0.52–1.04)
ERYTHROCYTE [DISTWIDTH] IN BLOOD BY AUTOMATED COUNT: 12.5 % (ref 10–15)
GFR SERPL CREATININE-BSD FRML MDRD: >90 ML/MIN/{1.73_M2}
GLUCOSE SERPL-MCNC: 189 MG/DL (ref 70–99)
HBA1C MFR BLD: 5.4 % (ref 0–5.6)
HCT VFR BLD AUTO: 36 % (ref 35–47)
HGB BLD-MCNC: 12.1 G/DL (ref 11.7–15.7)
MCH RBC QN AUTO: 31.3 PG (ref 26.5–33)
MCHC RBC AUTO-ENTMCNC: 33.6 G/DL (ref 31.5–36.5)
MCV RBC AUTO: 93 FL (ref 78–100)
PLATELET # BLD AUTO: 121 10E9/L (ref 150–450)
POTASSIUM SERPL-SCNC: 4.4 MMOL/L (ref 3.4–5.3)
RBC # BLD AUTO: 3.86 10E12/L (ref 3.8–5.2)
SODIUM SERPL-SCNC: 140 MMOL/L (ref 133–144)
WBC # BLD AUTO: 14.9 10E9/L (ref 4–11)

## 2019-05-31 PROCEDURE — 25000132 ZZH RX MED GY IP 250 OP 250 PS 637: Performed by: PHYSICIAN ASSISTANT

## 2019-05-31 PROCEDURE — 12000000 ZZH R&B MED SURG/OB

## 2019-05-31 PROCEDURE — 25000128 H RX IP 250 OP 636: Performed by: PHYSICIAN ASSISTANT

## 2019-05-31 PROCEDURE — 25800030 ZZH RX IP 258 OP 636: Performed by: PHYSICIAN ASSISTANT

## 2019-05-31 PROCEDURE — 80048 BASIC METABOLIC PNL TOTAL CA: CPT | Performed by: INTERNAL MEDICINE

## 2019-05-31 PROCEDURE — 83036 HEMOGLOBIN GLYCOSYLATED A1C: CPT | Performed by: PHYSICIAN ASSISTANT

## 2019-05-31 PROCEDURE — 36415 COLL VENOUS BLD VENIPUNCTURE: CPT | Performed by: PHYSICIAN ASSISTANT

## 2019-05-31 PROCEDURE — 99232 SBSQ HOSP IP/OBS MODERATE 35: CPT | Performed by: INTERNAL MEDICINE

## 2019-05-31 PROCEDURE — 85027 COMPLETE CBC AUTOMATED: CPT | Performed by: PHYSICIAN ASSISTANT

## 2019-05-31 PROCEDURE — 25000132 ZZH RX MED GY IP 250 OP 250 PS 637: Performed by: INTERNAL MEDICINE

## 2019-05-31 PROCEDURE — 83036 HEMOGLOBIN GLYCOSYLATED A1C: CPT | Performed by: INTERNAL MEDICINE

## 2019-05-31 RX ORDER — MEROPENEM 1 G/1
1 INJECTION, POWDER, FOR SOLUTION INTRAVENOUS EVERY 8 HOURS
Status: DISCONTINUED | OUTPATIENT
Start: 2019-05-31 | End: 2019-06-03 | Stop reason: HOSPADM

## 2019-05-31 RX ORDER — LISINOPRIL 10 MG/1
10 TABLET ORAL DAILY
Status: DISCONTINUED | OUTPATIENT
Start: 2019-05-31 | End: 2019-06-03 | Stop reason: HOSPADM

## 2019-05-31 RX ADMIN — DILTIAZEM HYDROCHLORIDE 360 MG: 180 CAPSULE, COATED, EXTENDED RELEASE ORAL at 08:32

## 2019-05-31 RX ADMIN — SODIUM CHLORIDE, PRESERVATIVE FREE: 5 INJECTION INTRAVENOUS at 07:51

## 2019-05-31 RX ADMIN — LISINOPRIL 10 MG: 10 TABLET ORAL at 14:17

## 2019-05-31 RX ADMIN — ENOXAPARIN SODIUM 40 MG: 40 INJECTION SUBCUTANEOUS at 10:52

## 2019-05-31 RX ADMIN — CEFTRIAXONE SODIUM 2 G: 2 INJECTION, POWDER, FOR SOLUTION INTRAMUSCULAR; INTRAVENOUS at 11:27

## 2019-05-31 ASSESSMENT — ACTIVITIES OF DAILY LIVING (ADL)
ADLS_ACUITY_SCORE: 12

## 2019-05-31 NOTE — PROVIDER NOTIFICATION
MD notified: Critical lab value: positive blood culture- gram negative rods, taken from right arm 5/30/19. Thanks!

## 2019-05-31 NOTE — PLAN OF CARE
Pt A&Ox4, up SBA. IV abx zosyn, rocephin. Pt very talkative and on phone frequently a Denies lightheadedness, sob, pain, n/v. VSS, afebrile.

## 2019-05-31 NOTE — PROGRESS NOTES
"[facility]  Hospitalist Progress Note  Anderson Grace MD 05/31/2019    Reason for Stay (Diagnosis): sepsis         Assessment and Plan:      Summary of Stay: Amalia Newman is a 49 year old female with a PMH significant for tachycardia and hypertension who presented 5/30 with nausea, anorexia and chills.  In the ER she was febrile and tachycardic c/w sepsis, and UA showed pyuria c/w UTI.  Since admit blood cx have grown ecoli.  Sensitivities pending.  Improving on Rocephin       1.  Sepsis secondary to UTI -clinically improved.  Blood cultures growing out E. coli.  Suspect urine source.  Urine culture is pending.  On Rocephin.  Sensitivities pending.        2.  Hypertension  -Continue PTA diltiazem on parameters but hold lisinopril in the setting of urosepsis           Social: No concerns  Code: Full code  VTE prophylaxis: Lovenox  Disposition: home in 1-2 days likely.  Need sensitivities from cx prior to discharge         Interval History (Subjective):      Patient was anxious about the diagnosis of \"sepsis\".  Overall feeling much better when she presented to the hospital.  Fevers resolved.  No abdominal pain.  Patient was concerned about her mother as apparently she had a fall, but it sounds like this situation has been taken care of                  Physical Exam:      Last Vital Signs:  /83 (BP Location: Right arm)   Pulse 93   Temp 98.4  F (36.9  C) (Oral)   Resp 16   Wt 83 kg (182 lb 15.7 oz)   LMP 12/06/2005   SpO2 97%   BMI 31.91 kg/m        Intake/Output Summary (Last 24 hours) at 5/31/2019 1313  Last data filed at 5/31/2019 0900  Gross per 24 hour   Intake 480 ml   Output 100 ml   Net 380 ml       Constitutional: Awake, alert, cooperative, no apparent distress.  Very talkative   Respiratory: Clear to auscultation bilaterally, no crackles or wheezing   Cardiovascular: Regular rate and rhythm, normal S1 and S2, and no murmur noted   Abdomen: Normal bowel sounds, soft, " non-distended, non-tender   Skin: No rashes, no cyanosis, dry to touch   Neuro: Alert and oriented x3, no weakness, numbness, memory loss   Extremities: No edema, normal range of motion   Other(s):        All other systems: Negative          Medications:      All current medications were reviewed with changes reflected in problem list.         Data:      All new lab and imaging data was reviewed.   Labs:  Recent Labs   Lab 05/31/19  0651   WBC 14.9*   HGB 12.1   HCT 36.0   MCV 93   *      Imaging:   No results found for this or any previous visit (from the past 24 hour(s)).

## 2019-05-31 NOTE — PLAN OF CARE
Pt A/O x 4, VSS, pt denies pain, headache, dizziness, n/v & SOB. Pt up independent. Tele: NSR. Lungs sounds clear, RA. Pt anxious and tearful about sepsis dx, very talkative. UA and blood cultures positive. IV Rocephin. Lactate level down to 1.9. A1C 5.4. Possible discharge 1-2 days. Will continue with plan of care.

## 2019-05-31 NOTE — PROVIDER NOTIFICATION
05/30/19 8425   Significant Event   Significant Event Critical result/value  (left arm drawn 5/30 gram negative rods)       MD paged

## 2019-05-31 NOTE — PROGRESS NOTES
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 Please contact Infection Prevention with any questions/concerns at *[phone].    Jessenia Mancia, ICP

## 2019-05-31 NOTE — PROGRESS NOTES
SPIRITUAL HEALTH SERVICES Progress Note  [facility] 3    Spiritual Health Services response to consult order.  Pt is in the midst of making phone calls to facilitate care for her mother.  She did not desire a SHS visit but rather requested a brief scripture reading.  Share Ps 121 and provided her with a New Testament.    Plan: Spiritual Health Services remains available for additional emotional/spiritual support.    Julio Alexander MA  Staff   Pager: [phone]  Phone: [phone]

## 2019-06-01 PROBLEM — A41.50 SEPSIS DUE TO GRAM-NEGATIVE UTI (H): Status: ACTIVE | Noted: 2019-06-01

## 2019-06-01 PROBLEM — A49.8 INFECTION DUE TO ESBL-PRODUCING ESCHERICHIA COLI: Status: ACTIVE | Noted: 2019-06-01

## 2019-06-01 PROBLEM — A41.51 E. COLI SEPSIS (H): Status: ACTIVE | Noted: 2019-06-01

## 2019-06-01 PROBLEM — Z16.12 INFECTION DUE TO ESBL-PRODUCING ESCHERICHIA COLI: Status: ACTIVE | Noted: 2019-06-01

## 2019-06-01 PROBLEM — N39.0 SEPSIS DUE TO GRAM-NEGATIVE UTI (H): Status: ACTIVE | Noted: 2019-06-01

## 2019-06-01 LAB
ANION GAP SERPL CALCULATED.3IONS-SCNC: 6 MMOL/L (ref 3–14)
BACTERIA SPEC CULT: NORMAL
BASOPHILS # BLD AUTO: 0 10E9/L (ref 0–0.2)
BASOPHILS NFR BLD AUTO: 0.4 %
BUN SERPL-MCNC: 6 MG/DL (ref 7–30)
CALCIUM SERPL-MCNC: 9.8 MG/DL (ref 8.5–10.1)
CHLORIDE SERPL-SCNC: 106 MMOL/L (ref 94–109)
CO2 SERPL-SCNC: 26 MMOL/L (ref 20–32)
CREAT SERPL-MCNC: 0.41 MG/DL (ref 0.52–1.04)
DIFFERENTIAL METHOD BLD: NORMAL
EOSINOPHIL # BLD AUTO: 0.2 10E9/L (ref 0–0.7)
EOSINOPHIL NFR BLD AUTO: 1.7 %
ERYTHROCYTE [DISTWIDTH] IN BLOOD BY AUTOMATED COUNT: 12.3 % (ref 10–15)
GFR SERPL CREATININE-BSD FRML MDRD: >90 ML/MIN/{1.73_M2}
GLUCOSE SERPL-MCNC: 133 MG/DL (ref 70–99)
HCT VFR BLD AUTO: 38 % (ref 35–47)
HGB BLD-MCNC: 12.6 G/DL (ref 11.7–15.7)
IMM GRANULOCYTES # BLD: 0.1 10E9/L (ref 0–0.4)
IMM GRANULOCYTES NFR BLD: 1 %
LYMPHOCYTES # BLD AUTO: 1.4 10E9/L (ref 0.8–5.3)
LYMPHOCYTES NFR BLD AUTO: 13.7 %
Lab: NORMAL
MCH RBC QN AUTO: 30.6 PG (ref 26.5–33)
MCHC RBC AUTO-ENTMCNC: 33.2 G/DL (ref 31.5–36.5)
MCV RBC AUTO: 92 FL (ref 78–100)
MONOCYTES # BLD AUTO: 0.8 10E9/L (ref 0–1.3)
MONOCYTES NFR BLD AUTO: 8.3 %
NEUTROPHILS # BLD AUTO: 7.4 10E9/L (ref 1.6–8.3)
NEUTROPHILS NFR BLD AUTO: 74.9 %
NRBC # BLD AUTO: 0 10*3/UL
NRBC BLD AUTO-RTO: 0 /100
PLATELET # BLD AUTO: 228 10E9/L (ref 150–450)
POTASSIUM SERPL-SCNC: 3.6 MMOL/L (ref 3.4–5.3)
RBC # BLD AUTO: 4.12 10E12/L (ref 3.8–5.2)
SODIUM SERPL-SCNC: 138 MMOL/L (ref 133–144)
SPECIMEN SOURCE: NORMAL
WBC # BLD AUTO: 9.9 10E9/L (ref 4–11)

## 2019-06-01 PROCEDURE — 12000000 ZZH R&B MED SURG/OB

## 2019-06-01 PROCEDURE — 25000128 H RX IP 250 OP 636: Performed by: PHYSICIAN ASSISTANT

## 2019-06-01 PROCEDURE — 25000128 H RX IP 250 OP 636: Performed by: INTERNAL MEDICINE

## 2019-06-01 PROCEDURE — 25000132 ZZH RX MED GY IP 250 OP 250 PS 637: Performed by: PHYSICIAN ASSISTANT

## 2019-06-01 PROCEDURE — 25000132 ZZH RX MED GY IP 250 OP 250 PS 637: Performed by: INTERNAL MEDICINE

## 2019-06-01 PROCEDURE — 85025 COMPLETE CBC W/AUTO DIFF WBC: CPT | Performed by: INTERNAL MEDICINE

## 2019-06-01 PROCEDURE — 36415 COLL VENOUS BLD VENIPUNCTURE: CPT | Performed by: INTERNAL MEDICINE

## 2019-06-01 PROCEDURE — 80048 BASIC METABOLIC PNL TOTAL CA: CPT | Performed by: INTERNAL MEDICINE

## 2019-06-01 PROCEDURE — 99233 SBSQ HOSP IP/OBS HIGH 50: CPT | Performed by: INTERNAL MEDICINE

## 2019-06-01 RX ADMIN — DILTIAZEM HYDROCHLORIDE 360 MG: 180 CAPSULE, COATED, EXTENDED RELEASE ORAL at 08:08

## 2019-06-01 RX ADMIN — MEROPENEM 1 G: 1 INJECTION, POWDER, FOR SOLUTION INTRAVENOUS at 23:23

## 2019-06-01 RX ADMIN — MEROPENEM 1 G: 1 INJECTION, POWDER, FOR SOLUTION INTRAVENOUS at 14:50

## 2019-06-01 RX ADMIN — LISINOPRIL 10 MG: 10 TABLET ORAL at 08:08

## 2019-06-01 RX ADMIN — MEROPENEM 1 G: 1 INJECTION, POWDER, FOR SOLUTION INTRAVENOUS at 00:11

## 2019-06-01 RX ADMIN — MEROPENEM 1 G: 1 INJECTION, POWDER, FOR SOLUTION INTRAVENOUS at 07:16

## 2019-06-01 RX ADMIN — ENOXAPARIN SODIUM 40 MG: 40 INJECTION SUBCUTANEOUS at 10:50

## 2019-06-01 ASSESSMENT — ACTIVITIES OF DAILY LIVING (ADL)
ADLS_ACUITY_SCORE: 12
ADLS_ACUITY_SCORE: 14
ADLS_ACUITY_SCORE: 12

## 2019-06-01 NOTE — PROGRESS NOTES
[facility]  Hospitalist Progress Note  Dion Guerrero MD 06/01/2019   Admission 5/30/2019  5:54 AM      Reason for Stay (Diagnosis): sepsis ESBL bacteremia         Assessment and Plan:      Summary of Stay: Amalia Newman is a 49 year old female with a PMH significant for tachycardia and hypertension who presented 5/30 with nausea, anorexia and chills.  In the ER she was febrile and tachycardic c/w sepsis, and UA showed pyuria c/w UTI.  Since admit blood cx have grown ecoli.  Lab called last night E. coli is ESBL, antibiotic was changed from Rocephin to meropenem infectious disease consulted.  Patient is a nursing assistant at [employer]       1.  Sepsis secondary to UTI with bacteremia ESBL-clinically improving.    Continue meropenem likely over the weekend especially with bacteremia  Infectious disease consultation       2.  Hypertension-Continue PTA diltiazem on parameters lisinopril was on hold because of sepsis likely can be restarted           Social: No concerns  Code: Full code  VTE prophylaxis: Lovenox  Disposition: home likely early next week, IV Invanz over the weekend, waiting for infectious disease for definite plan         Interval History (Subjective):      Very anxious, clinically improving, feels much better, she is concerned about diagnosis of ESBL and blood  Discussed with nursing staff                  Physical Exam:      Last Vital Signs:  /78 (BP Location: Right arm)   Pulse 93   Temp 98.3  F (36.8  C) (Oral)   Resp 20   Wt 83 kg (182 lb 15.7 oz)   LMP 12/06/2005   SpO2 98%   BMI 31.91 kg/m          Constitutional: Awake, alert, cooperative, no apparent distress.  Very talkative   Respiratory: Clear to auscultation bilaterally, no crackles or wheezing   Cardiovascular: Regular rate and rhythm, normal S1 and S2, and no murmur noted   Abdomen: Normal bowel sounds, soft, non-distended, non-tender   Skin: No rashes, no cyanosis, dry to touch   Neuro: Alert and  oriented x3, no weakness, numbness, memory loss   Extremities: No edema, normal range of motion   Other(s):        All other systems: Negative          Medications:      All current medications were reviewed with changes reflected in problem list.         Data:      All new lab and imaging data was reviewed.   Labs:  Recent Labs   Lab 06/01/19  0657   WBC 9.9   HGB 12.6   HCT 38.0   MCV 92         Imaging:   No results found for this or any previous visit (from the past 24 hour(s)).

## 2019-06-01 NOTE — PLAN OF CARE
VSS A&OX4 ind. Ambulated in room and halls. Afebrile. Bc esbl. Meropenem given. ID ordered. Tele SR. Poc discussed.

## 2019-06-01 NOTE — PROGRESS NOTES
Cross cover  Culture positive ESBL  Started on meropenem discontinue ceftriaxone  Contact isolation

## 2019-06-01 NOTE — PLAN OF CARE
VSS. A/O x 4. Up Independently in room.   Tele: SR HR 73.  Contact precautions maintained.   IV Merrem.

## 2019-06-01 NOTE — PLAN OF CARE
Pt up independently, denies pain or nausea, voiding well, SL'd, good PO intake, BC showed ESBL in her blood.

## 2019-06-01 NOTE — CONSULTS
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 coli sepsis (H) 2019     Essential hypertension, benign      Infection due to ESBL-producing Escherichia coli 2019     Pure hyperglyceridemia      Tachycardia      Past Surgical History:   Procedure Laterality Date     DILATION AND CURETTAGE       ENT SURGERY      wisdom teeth      HYSTERECTOMY RADICAL      open hysterectomy ; ovaries are left in       Problem (# of Occurrences) Relation (Name,Age of Onset)    C.A.D. (1) Father: 58    Cancer (2) Paternal Grandmother: Abdominal, Paternal Grandfather: Liver    Cerebrovascular Disease (1) Mother    Diabetes (1) Father    Hypertension (4) Mother, Father, Brother, Maternal Grandmother    Lipids (1) Mother    Vascular Disease (1) Father: PVD        Family History   Problem Relation Age of Onset     Hypertension Mother      Lipids Mother      Cerebrovascular Disease Mother      C.A.D. Father         58     Hypertension Father      Diabetes Father      Vascular Disease Father         PVD     Hypertension Brother      Cancer Paternal Grandmother         Abdominal     Cancer Paternal Grandfather         Liver     Hypertension Maternal Grandmother   "    Social History     Socioeconomic History     Marital status:      Spouse name: None     Number of children: 0     Years of education: None     Highest education level: None   Occupational History     Employer: [employer]   Tobacco Use     Smoking status: Never Smoker     Smokeless tobacco: Never Used   Substance and Sexual Activity     Alcohol use: Yes     Comment: \"very rare\"     Drug use: No     Sexual activity: Yes     Partners: Male     Exercise Yes     Bike Helmet Not Asked     Seat Belt Yes     Self-Exams No     Parent/sibling w/ CABG, MI or angioplasty before 65F 55M? Not Asked   Social History Narrative     None     Allergies   Allergen Reactions     No Known Allergies      Vicodin [Hydrocodone-Acetaminophen]      Angry, changes mood.      Immunization History   Administered Date(s) Administered     Influenza (High Dose) 3 valent vaccine 10/28/2013     Influenza (IIV3) PF 10/29/2012, 02/21/2017     TD (ADULT, 7+) 01/09/2007     TDAP Vaccine (Adacel) 10/28/2013     EXAM  /75 (BP Location: Right arm)   Pulse 93   Temp 98  F (36.7  C) (Oral)   Resp 18   Wt 83 kg (182 lb 15.7 oz)   LMP 12/06/2005   SpO2 97%   BMI 31.91 kg/m      const   In bed  Initially on phone     lungs   clear     CV   RRR     skin   No rash     neuro   Talkative  Smiling  Normal speech     joints   As expected age + BMI + gender   abd   Soft / not tender   psyche   A bit anxious   coherent, cooperative, appropriate        DATA  Cultures    Blood ESBL (+) E coli    LABS  Lab Test 06/01/19  0657 05/31/19  0651   WBC 9.9 14.9*   creat 0.41            ASSESSMENT   SUGGESTIONS    (A49.8,  Z16.12) Infection due to ESBL-producing Escherichia coli  (primary encounter diagnosis)  (A41.9,  R65.21) Septic shock (H)  (N39.0) Urinary tract infection without hematuria, site unspecified  (R00.0) Sinus tachycardia  (A41.51) E. coli sepsis (H)  (A41.51) Sepsis due to Escherichia coli (H)  (A41.50,  N39.0) Sepsis due " "to gram-negative UTI (H)    Doing better  No known antibiotic allergies  For now, continue meropenem  Consider transition to enteral TMP-S at time discharge and treat total 14 days (to June 13)  I d/w pt what \"ESBL\" is.         Jagjit Ridley MD  Covering for [facility]  Infectious Disease service  Current Meds Reviewed  Current Facility-Administered Medications   Medication     acetaminophen (TYLENOL) tablet 650 mg     diltiazem ER COATED BEADS (CARDIZEM CD/CARTIA XT) 24 hr capsule 360 mg     enoxaparin (LOVENOX) injection 40 mg     ibuprofen (ADVIL/MOTRIN) tablet 600 mg     lidocaine (LMX4) cream     lidocaine 1 % 0.1-1 mL     lisinopril (PRINIVIL/ZESTRIL) tablet 10 mg     magnesium sulfate 4 g in 100 mL sterile water (premade)     melatonin tablet 1 mg     meropenem (MERREM) 1 g vial to attach to  mL bag     naloxone (NARCAN) injection 0.1-0.4 mg     ondansetron (ZOFRAN-ODT) ODT tab 4 mg    Or     ondansetron (ZOFRAN) injection 4 mg     polyethylene glycol (MIRALAX/GLYCOLAX) Packet 17 g     potassium chloride (KLOR-CON) Packet 20-40 mEq     potassium chloride 10 mEq in 100 mL intermittent infusion with 10 mg lidocaine     potassium chloride 10 mEq in 100 mL sterile water intermittent infusion (premix)     potassium chloride 20 mEq in 50 mL intermittent infusion     potassium chloride ER (K-DUR/KLOR-CON M) CR tablet 20-40 mEq     prochlorperazine (COMPAZINE) injection 10 mg    Or     prochlorperazine (COMPAZINE) tablet 10 mg    Or     prochlorperazine (COMPAZINE) Suppository 25 mg     senna-docusate (SENOKOT-S/PERICOLACE) 8.6-50 MG per tablet 1 tablet    Or     senna-docusate (SENOKOT-S/PERICOLACE) 8.6-50 MG per tablet 2 tablet     sodium chloride (PF) 0.9% PF flush 3 mL     sodium chloride (PF) 0.9% PF flush 3 mL      "

## 2019-06-02 LAB
ANION GAP SERPL CALCULATED.3IONS-SCNC: 5 MMOL/L (ref 3–14)
BACTERIA SPEC CULT: ABNORMAL
BUN SERPL-MCNC: 9 MG/DL (ref 7–30)
CALCIUM SERPL-MCNC: 9.7 MG/DL (ref 8.5–10.1)
CHLORIDE SERPL-SCNC: 106 MMOL/L (ref 94–109)
CO2 SERPL-SCNC: 28 MMOL/L (ref 20–32)
CREAT SERPL-MCNC: 0.37 MG/DL (ref 0.52–1.04)
ERYTHROCYTE [DISTWIDTH] IN BLOOD BY AUTOMATED COUNT: 12.3 % (ref 10–15)
GFR SERPL CREATININE-BSD FRML MDRD: >90 ML/MIN/{1.73_M2}
GLUCOSE SERPL-MCNC: 124 MG/DL (ref 70–99)
HCT VFR BLD AUTO: 38 % (ref 35–47)
HGB BLD-MCNC: 12.8 G/DL (ref 11.7–15.7)
Lab: ABNORMAL
MCH RBC QN AUTO: 30.8 PG (ref 26.5–33)
MCHC RBC AUTO-ENTMCNC: 33.7 G/DL (ref 31.5–36.5)
MCV RBC AUTO: 91 FL (ref 78–100)
PLATELET # BLD AUTO: 229 10E9/L (ref 150–450)
POTASSIUM SERPL-SCNC: 3.8 MMOL/L (ref 3.4–5.3)
RBC # BLD AUTO: 4.16 10E12/L (ref 3.8–5.2)
SODIUM SERPL-SCNC: 139 MMOL/L (ref 133–144)
SPECIMEN SOURCE: ABNORMAL
WBC # BLD AUTO: 7.6 10E9/L (ref 4–11)

## 2019-06-02 PROCEDURE — 25000128 H RX IP 250 OP 636: Performed by: INTERNAL MEDICINE

## 2019-06-02 PROCEDURE — 25000132 ZZH RX MED GY IP 250 OP 250 PS 637: Performed by: PHYSICIAN ASSISTANT

## 2019-06-02 PROCEDURE — 12000000 ZZH R&B MED SURG/OB

## 2019-06-02 PROCEDURE — 85027 COMPLETE CBC AUTOMATED: CPT | Performed by: PHYSICIAN ASSISTANT

## 2019-06-02 PROCEDURE — 80048 BASIC METABOLIC PNL TOTAL CA: CPT | Performed by: PHYSICIAN ASSISTANT

## 2019-06-02 PROCEDURE — 25000132 ZZH RX MED GY IP 250 OP 250 PS 637: Performed by: INTERNAL MEDICINE

## 2019-06-02 PROCEDURE — 25000128 H RX IP 250 OP 636: Performed by: PHYSICIAN ASSISTANT

## 2019-06-02 PROCEDURE — 36415 COLL VENOUS BLD VENIPUNCTURE: CPT | Performed by: PHYSICIAN ASSISTANT

## 2019-06-02 PROCEDURE — 99207 ZZC CDG-MDM COMPONENT: MEETS LOW - DOWN CODED: CPT | Performed by: INTERNAL MEDICINE

## 2019-06-02 PROCEDURE — 99232 SBSQ HOSP IP/OBS MODERATE 35: CPT | Performed by: INTERNAL MEDICINE

## 2019-06-02 RX ADMIN — MEROPENEM 1 G: 1 INJECTION, POWDER, FOR SOLUTION INTRAVENOUS at 16:31

## 2019-06-02 RX ADMIN — ENOXAPARIN SODIUM 40 MG: 40 INJECTION SUBCUTANEOUS at 11:29

## 2019-06-02 RX ADMIN — LISINOPRIL 10 MG: 10 TABLET ORAL at 07:59

## 2019-06-02 RX ADMIN — MEROPENEM 1 G: 1 INJECTION, POWDER, FOR SOLUTION INTRAVENOUS at 07:59

## 2019-06-02 RX ADMIN — DILTIAZEM HYDROCHLORIDE 360 MG: 180 CAPSULE, COATED, EXTENDED RELEASE ORAL at 07:59

## 2019-06-02 ASSESSMENT — ACTIVITIES OF DAILY LIVING (ADL)
ADLS_ACUITY_SCORE: 12

## 2019-06-02 NOTE — PROGRESS NOTES
[facility]  Hospitalist Progress Note  Girma Donovan MD 06/02/2019   Admission 5/30/2019  5:54 AM      Reason for Stay (Diagnosis): Sepsis due to ESBL.         Assessment and Plan:      Summary of Stay: Amalia Newman is a 49 year old female with a PMH significant for tachycardia and hypertension who presented 5/30 with nausea, anorexia and chills.  In the ER she was febrile and tachycardic c/w sepsis, and UA showed pyuria c/w UTI.  Since admit blood cx have grown ecoli.  Lab called last night E. coli is ESBL, antibiotic was changed from Rocephin to meropenem infectious disease consulted.  Patient is a nursing assistant at [employer]     1.  Sepsis secondary ESBL  2. UTI.  -Clinically improving.  -UA +. UC grew mixed Urogenital organisms.  -Continue Meropenem for today, noted ID recommendation, input appreciated.  -Patient is concerned about this resistant organism, and asking if IV is better to eradicate the bacteria.      2.  Hypertension- Continue PTA diltiazem with parameters, Lisinopril restarted. BP is ok.      Social: No concerns.  Code: Full code  VTE prophylaxis: Lovenox  Disposition: home likely tomorrow if she continues to improve, need to discuss again regarding discharge antibiotic as she has questions and concerns.   I discussed with patient and RN the plan of care.;      Interval History (Subjective):      Patient seen and examined, assumed care today, anxious, clinically improving, feels better, she is concerned about diagnosis of ESBL in the blood                 Physical Exam:      Last Vital Signs:  /80 (BP Location: Right arm)   Pulse 93   Temp 97.9  F (36.6  C) (Oral)   Resp 18   Wt 83 kg (182 lb 15.7 oz)   LMP 12/06/2005   SpO2 98%   BMI 31.91 kg/m      Constitutional: Awake, alert, cooperative, no apparent distress.  Very talkative   Respiratory: Clear to auscultation bilaterally, no crackles or wheezing   Cardiovascular: Regular rate and rhythm, normal S1  and S2, and no murmur noted   Abdomen: Normal bowel sounds, soft, non-distended, non-tender   Skin: No rashes, no cyanosis, dry to touch   Neuro: Alert and oriented x3, no weakness, numbness, memory loss   Extremities: No edema, normal range of motion   Other(s):        All other systems: Negative          Medications:        Current Facility-Administered Medications   Medication     acetaminophen (TYLENOL) tablet 650 mg     diltiazem ER COATED BEADS (CARDIZEM CD/CARTIA XT) 24 hr capsule 360 mg     enoxaparin (LOVENOX) injection 40 mg     ibuprofen (ADVIL/MOTRIN) tablet 600 mg     lidocaine (LMX4) cream     lidocaine 1 % 0.1-1 mL     lisinopril (PRINIVIL/ZESTRIL) tablet 10 mg     magnesium sulfate 4 g in 100 mL sterile water (premade)     melatonin tablet 1 mg     meropenem (MERREM) 1 g vial to attach to  mL bag     naloxone (NARCAN) injection 0.1-0.4 mg     ondansetron (ZOFRAN-ODT) ODT tab 4 mg    Or     ondansetron (ZOFRAN) injection 4 mg     polyethylene glycol (MIRALAX/GLYCOLAX) Packet 17 g     potassium chloride (KLOR-CON) Packet 20-40 mEq     potassium chloride 10 mEq in 100 mL intermittent infusion with 10 mg lidocaine     potassium chloride 10 mEq in 100 mL sterile water intermittent infusion (premix)     potassium chloride 20 mEq in 50 mL intermittent infusion     potassium chloride ER (K-DUR/KLOR-CON M) CR tablet 20-40 mEq     prochlorperazine (COMPAZINE) injection 10 mg    Or     prochlorperazine (COMPAZINE) tablet 10 mg    Or     prochlorperazine (COMPAZINE) Suppository 25 mg     senna-docusate (SENOKOT-S/PERICOLACE) 8.6-50 MG per tablet 1 tablet    Or     senna-docusate (SENOKOT-S/PERICOLACE) 8.6-50 MG per tablet 2 tablet     sodium chloride (PF) 0.9% PF flush 3 mL     sodium chloride (PF) 0.9% PF flush 3 mL            Data:      All new lab and imaging data was reviewed.   Labs:  Recent Labs   Lab 06/02/19  0744   WBC 7.6   HGB 12.8   HCT 38.0   MCV 91        Imaging:   No results found  for this or any previous visit (from the past 24 hour(s)).

## 2019-06-02 NOTE — PLAN OF CARE
Pt here for UTI sepsis. ID is following and states the pt is doing better. Tele SR. MRSA in armpit ESBL in blood. Pt is up IND. Regular diet. Pt slept well through the night. Pt took a shower about 0200. Plan is to stay till at least Monday. Will continue to monitor.

## 2019-06-03 VITALS
DIASTOLIC BLOOD PRESSURE: 76 MMHG | OXYGEN SATURATION: 96 % | SYSTOLIC BLOOD PRESSURE: 137 MMHG | WEIGHT: 182.98 LBS | BODY MASS INDEX: 31.91 KG/M2 | TEMPERATURE: 97.5 F | RESPIRATION RATE: 18 BRPM | HEART RATE: 93 BPM

## 2019-06-03 LAB
ANION GAP SERPL CALCULATED.3IONS-SCNC: 6 MMOL/L (ref 3–14)
BACTERIA SPEC CULT: ABNORMAL
BACTERIA SPEC CULT: ABNORMAL
BUN SERPL-MCNC: 13 MG/DL (ref 7–30)
CALCIUM SERPL-MCNC: 9.9 MG/DL (ref 8.5–10.1)
CHLORIDE SERPL-SCNC: 103 MMOL/L (ref 94–109)
CO2 SERPL-SCNC: 28 MMOL/L (ref 20–32)
CREAT SERPL-MCNC: 0.43 MG/DL (ref 0.52–1.04)
ERYTHROCYTE [DISTWIDTH] IN BLOOD BY AUTOMATED COUNT: 12.1 % (ref 10–15)
GFR SERPL CREATININE-BSD FRML MDRD: >90 ML/MIN/{1.73_M2}
GLUCOSE SERPL-MCNC: 112 MG/DL (ref 70–99)
HCT VFR BLD AUTO: 40 % (ref 35–47)
HGB BLD-MCNC: 13.6 G/DL (ref 11.7–15.7)
Lab: ABNORMAL
MCH RBC QN AUTO: 31.1 PG (ref 26.5–33)
MCHC RBC AUTO-ENTMCNC: 34 G/DL (ref 31.5–36.5)
MCV RBC AUTO: 92 FL (ref 78–100)
PLATELET # BLD AUTO: 253 10E9/L (ref 150–450)
POTASSIUM SERPL-SCNC: 4.3 MMOL/L (ref 3.4–5.3)
RBC # BLD AUTO: 4.37 10E12/L (ref 3.8–5.2)
SODIUM SERPL-SCNC: 137 MMOL/L (ref 133–144)
SPECIMEN SOURCE: ABNORMAL
WBC # BLD AUTO: 11.2 10E9/L (ref 4–11)

## 2019-06-03 PROCEDURE — 36415 COLL VENOUS BLD VENIPUNCTURE: CPT | Performed by: PHYSICIAN ASSISTANT

## 2019-06-03 PROCEDURE — 25000132 ZZH RX MED GY IP 250 OP 250 PS 637: Performed by: PHYSICIAN ASSISTANT

## 2019-06-03 PROCEDURE — 25000128 H RX IP 250 OP 636: Performed by: PHYSICIAN ASSISTANT

## 2019-06-03 PROCEDURE — 25000132 ZZH RX MED GY IP 250 OP 250 PS 637: Performed by: INTERNAL MEDICINE

## 2019-06-03 PROCEDURE — 80048 BASIC METABOLIC PNL TOTAL CA: CPT | Performed by: PHYSICIAN ASSISTANT

## 2019-06-03 PROCEDURE — 25000128 H RX IP 250 OP 636: Performed by: INTERNAL MEDICINE

## 2019-06-03 PROCEDURE — 85027 COMPLETE CBC AUTOMATED: CPT | Performed by: PHYSICIAN ASSISTANT

## 2019-06-03 PROCEDURE — 99239 HOSP IP/OBS DSCHRG MGMT >30: CPT | Performed by: INTERNAL MEDICINE

## 2019-06-03 RX ORDER — SULFAMETHOXAZOLE/TRIMETHOPRIM 800-160 MG
1 TABLET ORAL 2 TIMES DAILY
Qty: 20 TABLET | Refills: 0 | Status: SHIPPED | OUTPATIENT
Start: 2019-06-03 | End: 2019-06-20

## 2019-06-03 RX ADMIN — ENOXAPARIN SODIUM 40 MG: 40 INJECTION SUBCUTANEOUS at 10:40

## 2019-06-03 RX ADMIN — LISINOPRIL 10 MG: 10 TABLET ORAL at 08:08

## 2019-06-03 RX ADMIN — MEROPENEM 1 G: 1 INJECTION, POWDER, FOR SOLUTION INTRAVENOUS at 00:22

## 2019-06-03 RX ADMIN — MEROPENEM 1 G: 1 INJECTION, POWDER, FOR SOLUTION INTRAVENOUS at 08:08

## 2019-06-03 RX ADMIN — DILTIAZEM HYDROCHLORIDE 360 MG: 180 CAPSULE, COATED, EXTENDED RELEASE ORAL at 08:08

## 2019-06-03 ASSESSMENT — ACTIVITIES OF DAILY LIVING (ADL)
ADLS_ACUITY_SCORE: 10
ADLS_ACUITY_SCORE: 12
ADLS_ACUITY_SCORE: 10
ADLS_ACUITY_SCORE: 10

## 2019-06-03 NOTE — PLAN OF CARE
VSS, afebrile and RA sats 99%.  Tele SR.  Denies pain.  Up I in room. Cont on IV Merrem.  POC reviewed with pt, questions answered.

## 2019-06-03 NOTE — DISCHARGE SUMMARY
[facility]  Hospitalist Discharge Summary       Date of Admission:  5/30/2019  Date of Discharge:  6/3/2019  1:23 PM  Discharging Provider: Girma Donovan MD      Discharge Diagnoses   ESBL sepsis.  UTI  HTN    Follow-ups Needed After Discharge   Follow-up Appointments     Follow-up and recommended labs and tests       Follow up with primary care provider, Amina Olivares, within5 days   for hospital follow- up.           Unresulted Labs Ordered in the Past 30 Days of this Admission     No orders found from 3/31/2019 to 5/31/2019.        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  In the ER she was febrile and tachycardic c/w sepsis, and UA showed pyuria c/w UTI, blood culture done and started on Rocephin and admitted to hospital. The blood culture grew E. Coli later on found to be ESBL, and antibiotics changed to Merrem while in hospital. ID was consulted and transitioned to bactrim up on discharge. In the hospital, she remained stable, and symptoms improved. She states that she has been through a lot and stressed. She was given work release for this week and advised to follow up with her PCP. All her questions and concerns addressed.I discussed with her at length.      Consultations This Hospital Stay   SPIRITUAL HEALTH SERVICES IP CONSULT  INFECTIOUS DISEASES IP CONSULT    Code Status   Full Code    Time Spent on this Encounter   I, Girma Donovan, personally saw the patient today and spent greater than 30 minutes discharging this patient.       Girma Donovan MD  [facility]  ______________________________________________________________________    Physical Exam   Vital Signs: Temp: 97.5  F (36.4  C) Temp src: Oral  BP: 137/76   Heart Rate: 85 Resp: 18 SpO2: 96 % O2 Device: None (Room air)    Weight: 182 lbs 15.71 oz       Constitutional: Awake, alert, cooperative, no apparent distress.  Very talkative   Respiratory: Clear to auscultation bilaterally, no crackles or wheezing   Cardiovascular: Regular rate and rhythm, normal S1 and S2, and no murmur noted   Abdomen: Normal bowel sounds, soft, non-distended, non-tender   Skin: No rashes, no cyanosis, dry to touch   Neuro: Alert and oriented x3, no weakness, numbness, memory loss   Extremities: No edema, normal range of motion   Other(s):           All other systems: Negative     Primary Care Physician   Amina Olivares    Discharge Orders      Reason for your hospital stay    ESBL E. Coli sepsis     Follow-up and recommended labs and tests     Follow up with primary care provider, Amina Olivares, within5 days for hospital follow- up.     Activity    Your activity upon discharge: activity as tolerated.  Work release for 7 days.     Full Code     Diet    Follow this diet upon discharge: Orders Placed This Encounter      Advance Diet as Tolerated: Regular Diet Adult       Significant Results and Procedures   Most Recent 3 CBC's:  Recent Labs   Lab Test 06/03/19  0655 06/02/19  0744 06/01/19  0657   WBC 11.2* 7.6 9.9   HGB 13.6 12.8 12.6   MCV 92 91 92    229 228     Most Recent 3 BMP's:  Recent Labs   Lab Test 06/03/19  0655 06/02/19  0744 06/01/19  0657    139 138   POTASSIUM 4.3 3.8 3.6   CHLORIDE 103 106 106   CO2 28 28 26   BUN 13 9 6*   CR 0.43* 0.37* 0.41*   ANIONGAP 6 5 6   WEST 9.9 9.7 9.8   * 124* 133*     Most Recent INR's and Anticoagulation Dosing History:  Anticoagulation Dose History     There is no flowsheet data to display.        Most Recent 6 Bacteria Isolates From Any Culture (See EPIC Reports for Culture Details):  Recent Labs   Lab Test 05/30/19  0705 05/30/19  0631 05/30/19  0612   CULT >100,000 colonies/mL  mixed urogenital  noe  Susceptibility testing not routinely done   Cultured on the 1st day of incubation:  Escherichia coli ESBL  *  Critical Value/Significant Value, preliminary result only, called to and read back by  Brianda Viera RN @ 2230. 5/30/19. AMV    Critical Value/Significant Value, preliminary result only, called to and read back by  Dorinda House RN at 2244 on 5.31.19 by SS.    Enterobacteriaceae that are susceptible to meropenem are usually susceptible to ertapenem.  ESBL (extended beta lactamase) producing organisms require contact precautions.  (Note)  POSITIVE for E.COLI by [employer] multiplex nucleic acid test. Final  identification and antimicrobial susceptibility testing will be  verified by standard methods. Verigene test will not distinguish  E.coli from Shigella species including S.dysenteriae, S.flexneri,  S.boydii, and S.sonnei. Specimens containing Shigella species or  E.coli will be reported as Positive for E.coli.      Specimen tested with [employer] multiplex, gram-negative blood culture  nucleic acid test for the following targets: Acinetobacter sp.,  Citrobacter sp., Enterobacter sp., Proteus sp., E. coli, K.  pneumoniae/oxytoca, P. aeruginosa, and the following resistance  markers: CTXM, KPC, NDM, VIM, IMP and OXA.      Critical Value/Significant Value called to and read back by Kaelyn Ascencio RN at 0109 on 5.31.19 by SS.         Cultured on the 1st day of incubation:  Escherichia coli  Susceptibility testing done on previous specimen  *  Critical Value/Significant Value, preliminary result only, called to and read back by  Tiesha Gallegos RN at 0023 on 5.31.19 by SS.       Most Recent Urinalysis:  Recent Labs   Lab Test 05/30/19  0705   COLOR Yellow   APPEARANCE Slightly Cloudy   URINEGLC Negative   URINEBILI Negative   URINEKETONE 10*   SG 1.016   UBLD Moderate*   URINEPH 5.5   PROTEIN 50*   NITRITE Positive*   LEUKEST Large*   RBCU 18*   WBCU >182*   ,   Results for orders placed or performed  during the hospital encounter of 05/30/19   Chest XR,  PA & LAT    Narrative    CHEST TWO VIEWS  5/30/2019 7:30 AM     HISTORY:  Cough.    COMPARISON: None.      Impression    IMPRESSION: Lungs clear. No pleural effusions. Heart size and  pulmonary vascularity are within normal limits. Pectus excavatum.     CATHY GERMAN MD       Discharge Medications   Discharge Medication List as of 6/3/2019 12:44 PM      START taking these medications    Details   sulfamethoxazole-trimethoprim (BACTRIM DS/SEPTRA DS) 800-160 MG tablet Take 1 tablet by mouth 2 times daily for 10 days, Disp-20 tablet, R-0, E-Prescribe         CONTINUE these medications which have NOT CHANGED    Details   ascorbic acid (VITAMIN C) 500 MG tablet Take 2 tablets (1,000 mg) by mouth daily, Disp-30 tablet, Historical      diltiazem ER COATED BEADS (CARDIZEM CD) 360 MG 24 hr capsule Take 1 capsule (360 mg) by mouth daily, Disp-30 capsule, R-11, E-Prescribe      lisinopril (PRINIVIL/ZESTRIL) 10 MG tablet Take 1 tablet (10 mg) by mouth daily, Disp-30 tablet, R-11, E-Prescribe      Ubiquinol (QUNOL COQ10/UBIQUINOL/ANDRES) 100 MG CAPS Take 100 mg by mouth daily , Historical           Allergies   Allergies   Allergen Reactions     No Known Allergies      Vicodin [Hydrocodone-Acetaminophen]      Angry, changes mood.

## 2019-06-03 NOTE — PLAN OF CARE
Vital signs:  Temp: 98.7  F (37.1  C) Temp src: Oral BP: 110/72   Heart Rate: 82 Resp: 16 SpO2: 94 %    Neuros: intact  GI: Bowel sounds positive x 4  : Voiding well  SKIN: Intact  Activity: Independent   Diet: Regular  Pain Denies pain. SR HR 73 on telemetry  Plan:  Discharge home today on oral antibiotics

## 2019-06-03 NOTE — PLAN OF CARE
dischged after instructions reviewed with understanding and acceptance of plan acknowledged.  Filled script  Given to pty

## 2019-06-04 ENCOUNTER — TELEPHONE (OUTPATIENT)
Dept: INTERNAL MEDICINE | Facility: CLINIC | Age: 49
End: 2019-06-04

## 2019-06-04 NOTE — TELEPHONE ENCOUNTER
IP F/U    Date: 06/03/19  Diagnosis: Septic Shock, Infection Due To Esbi-Producing Escherichia Colli  Is patient active in care coordination? No  Was patient in TCU? No    Next 5 appointments (look out 90 days)    Jun 11, 2019  8:40 AM CDT  SHORT with WADE Dillon CNP  [facility] [address] [place] [id number] [phone]

## 2019-06-05 NOTE — TELEPHONE ENCOUNTER
"ED/Discharge Protocol    \"Hi, my name is Eun Brown, a registered nurse, and I am calling on behalf of Amina Olivares's office at [place].  I am calling to follow up and see how things are going for you after your recent visit.\"    \"I see that you were in the (ER/UC/IP) on 5/30/19.    How are you doing now that you are home?\"     Patient states she is doing well. Patient states she is taking medication as directed. Denies fever. Appetite is back. Patient states her urine is clear.    Is patient experiencing symptoms that may require a hospital visit?  No.    Discharge Instructions    \"Let's review your discharge instructions.  What is/are the follow-up recommendations?  Pt. Response: Follow up with primary care provider     \"Were you instructed to make a follow-up appointment?\"  Pt. Response: Yes.  Has appointment been made?   Yes, assisted in scheduling to move appointment sooner     \"When you see the provider, I would recommend that you bring your discharge instructions with you.    Medications    \"How many new medications are you on since your hospitalization/ED visit?\"    0-1  \"How many of your current medicines changed (dose, timing, name, etc.) while you were in the hospital/ED visit?\"   0-1  \"Do you have questions about your medications?\"   No  \"Were you newly diagnosed with heart failure, COPD, diabetes or did you have a heart attack?\"   No  For patients on insulin: \"Did you start on insulin in the hospital or did you have your insulin dose changed?\"   No    Medication reconciliation completed? Yes    Was MTM referral placed (*Make sure to put transitions as reason for referral)?   No    Call Summary    \"Do you have any questions or concerns about your condition or care plan at the moment?\"    No  Triage nurse advice given: N/A    Patient was in ER once in the past year (assess appropriateness of ER visits.)      \"If you have questions or things don't continue to improve, we encourage you contact us through " "the main clinic number,  [phone].  Even if the clinic is not open, triage nurses are available 24/7 to help you.     We would like you to know that our clinic has extended hours (provide information).  We also have urgent care (provide details on closest location and hours/contact info)\"      \"Thank you for your time and take care!\"        "

## 2019-06-07 ENCOUNTER — OFFICE VISIT (OUTPATIENT)
Dept: INTERNAL MEDICINE | Facility: CLINIC | Age: 49
End: 2019-06-07
Payer: COMMERCIAL

## 2019-06-07 VITALS
HEART RATE: 100 BPM | WEIGHT: 183 LBS | OXYGEN SATURATION: 99 % | DIASTOLIC BLOOD PRESSURE: 40 MMHG | TEMPERATURE: 99.2 F | RESPIRATION RATE: 24 BRPM | HEIGHT: 63 IN | SYSTOLIC BLOOD PRESSURE: 110 MMHG | BODY MASS INDEX: 32.43 KG/M2

## 2019-06-07 DIAGNOSIS — Z86.19 HISTORY OF ESBL E. COLI INFECTION: Primary | ICD-10-CM

## 2019-06-07 DIAGNOSIS — R82.90 URINE ABNORMALITY: ICD-10-CM

## 2019-06-07 PROCEDURE — 87086 URINE CULTURE/COLONY COUNT: CPT | Performed by: NURSE PRACTITIONER

## 2019-06-07 PROCEDURE — 99213 OFFICE O/P EST LOW 20 MIN: CPT | Performed by: NURSE PRACTITIONER

## 2019-06-07 ASSESSMENT — MIFFLIN-ST. JEOR: SCORE: 1419.21

## 2019-06-07 NOTE — LETTER
[place] [address]  [facility] [address]  [phone]          June 7, 2019    RE:  Amalia Newman                                                                                                                                                       [id number] [facility] [address]            To whom it may concern:    Amalia Newman will need to be off work until June 18 th, to recover from her illness.           Sincerely,        Amina Olivares CNP

## 2019-06-07 NOTE — PROGRESS NOTES
Subjective     Amalia Newman is a 49 year old female who presents to clinic today for the following health issues:    HPI       Hospital Follow-up Visit:    Hospital/Nursing Home/IP Rehab Facility: [facility]  Date of Admission: 5/30/19  Date of Discharge: 6/3/19  Reason(s) for Admission: sepis, UTI             Problems taking medications regularly:  None       Medication changes since discharge: added Bactrim        Problems adhering to non-medication therapy:  None    Summary of hospitalization:  [facility] discharge summary reviewed  Diagnostic Tests/Treatments reviewed.  Follow up needed: none  Other Healthcare Providers Involved in Patient s Care:           Update since discharge: improved.     Post Discharge Medication Reconciliation: discharge medications reconciled and changed, per note/orders (see AVS).  Plan of care communicated with patient     Coding guidelines for this visit:  Type of Medical   Decision Making Face-to-Face Visit       within 7 Days of discharge Face-to-Face Visit        within 14 days of discharge   Moderate Complexity [id number]   High Complexity [id number]     She is feeling tired   She is taking antibiotics   She worries she is not better - but no chills or fever at home   She came without air conditioning today and fever 99.2- went to 99 after sitting in cool room for a short while - do not feel this fever concerning     She has no symptoms - wants to recheck urine test   Culture pending     Requests RTW on 18 th to recover related to fatigue   She woks as aide at NH     Her mother to move to care facility and this is stressful                 Reviewed and updated as needed this visit by Provider  Tobacco  Allergies  Meds  Problems  Med Hx  Surg Hx  Fam Hx         Review of Systems   ROS COMP: Constitutional, HEENT, cardiovascular, pulmonary, gi and gu systems are negative, except as otherwise noted.      Objective    /40   Pulse 100   Temp  "99.2  F (37.3  C) (Oral)   Resp 24   Ht 1.6 m (5' 3\")   Wt 83 kg (183 lb)   LMP 12/06/2005   SpO2 99%   BMI 32.42 kg/m    Body mass index is 32.42 kg/m .  Physical Exam   GENERAL: healthy, alert and no distress  RESP: lungs clear to auscultation - no rales, rhonchi or wheezes  CV: regular rate and rhythm, normal S1 S2, no S3 or S4, no murmur, click or rub, no peripheral edema and peripheral pulses strong  ABDOMEN: soft, nontender, no hepatosplenomegaly, no masses and bowel sounds normal  MS: no gross musculoskeletal defects noted, no edema  PSYCH: mentation appears normal, affect normal/bright    Diagnostic Test Results:  Labs reviewed in Epic  Reviewed ER   Lab         Assessment & Plan     (Z86.19) History of ESBL E. coli infection  (primary encounter diagnosis)  Comment: on antibiotic and improved post hospital   Plan: complete antibiotics     (R82.90) Urine abnormality  Comment:   Plan: urine culture pending       1. Urinary tract infection  Resolved - recheck urine   - Urine Culture Aerobic Bacterial    2. History of ESBL E. coli infection  On antibiotic and improved post hospital        BMI:   Estimated body mass index is 32.42 kg/m  as calculated from the following:    Height as of this encounter: 1.6 m (5' 3\").    Weight as of this encounter: 83 kg (183 lb).           Patient Instructions   Continue antibiotics       Return in about 1 year (around 6/7/2020).    WADE Dillon [facility]    "

## 2019-06-08 LAB
BACTERIA SPEC CULT: NORMAL
SPECIMEN SOURCE: NORMAL

## 2019-06-11 ENCOUNTER — HOSPITAL ENCOUNTER (EMERGENCY)
Facility: CLINIC | Age: 49
Discharge: HOME | End: 2019-06-11
Attending: PHYSICIAN ASSISTANT | Admitting: PHYSICIAN ASSISTANT
Payer: COMMERCIAL

## 2019-06-11 VITALS
RESPIRATION RATE: 20 BRPM | DIASTOLIC BLOOD PRESSURE: 81 MMHG | OXYGEN SATURATION: 99 % | TEMPERATURE: 99.1 F | HEART RATE: 92 BPM | SYSTOLIC BLOOD PRESSURE: 141 MMHG

## 2019-06-11 DIAGNOSIS — T78.40XA ALLERGIC REACTION, INITIAL ENCOUNTER: ICD-10-CM

## 2019-06-11 LAB
ALBUMIN UR-MCNC: 10 MG/DL
ALBUMIN UR-MCNC: NEGATIVE MG/DL
APPEARANCE UR: ABNORMAL
APPEARANCE UR: CLEAR
BACTERIA #/AREA URNS HPF: ABNORMAL /HPF
BACTERIA #/AREA URNS HPF: ABNORMAL /HPF
BILIRUB UR QL STRIP: NEGATIVE
BILIRUB UR QL STRIP: NEGATIVE
COLOR UR AUTO: ABNORMAL
COLOR UR AUTO: YELLOW
GLUCOSE UR STRIP-MCNC: NEGATIVE MG/DL
GLUCOSE UR STRIP-MCNC: NEGATIVE MG/DL
HGB UR QL STRIP: ABNORMAL
HGB UR QL STRIP: ABNORMAL
HYALINE CASTS #/AREA URNS LPF: 14 /LPF (ref 0–2)
KETONES UR STRIP-MCNC: NEGATIVE MG/DL
KETONES UR STRIP-MCNC: NEGATIVE MG/DL
LEUKOCYTE ESTERASE UR QL STRIP: NEGATIVE
LEUKOCYTE ESTERASE UR QL STRIP: NEGATIVE
MUCOUS THREADS #/AREA URNS LPF: PRESENT /LPF
MUCOUS THREADS #/AREA URNS LPF: PRESENT /LPF
NITRATE UR QL: NEGATIVE
NITRATE UR QL: NEGATIVE
PH UR STRIP: 5.5 PH (ref 5–7)
PH UR STRIP: 5.5 PH (ref 5–7)
RBC #/AREA URNS AUTO: 40 /HPF (ref 0–2)
RBC #/AREA URNS AUTO: 6 /HPF (ref 0–2)
SOURCE: ABNORMAL
SOURCE: ABNORMAL
SP GR UR STRIP: 1.01 (ref 1–1.03)
SP GR UR STRIP: 1.02 (ref 1–1.03)
SQUAMOUS #/AREA URNS AUTO: 22 /HPF (ref 0–1)
SQUAMOUS #/AREA URNS AUTO: <1 /HPF (ref 0–1)
UROBILINOGEN UR STRIP-MCNC: NORMAL MG/DL (ref 0–2)
UROBILINOGEN UR STRIP-MCNC: NORMAL MG/DL (ref 0–2)
WBC #/AREA URNS AUTO: 3 /HPF (ref 0–5)
WBC #/AREA URNS AUTO: 7 /HPF (ref 0–5)

## 2019-06-11 PROCEDURE — 96372 THER/PROPH/DIAG INJ SC/IM: CPT

## 2019-06-11 PROCEDURE — 96375 TX/PRO/DX INJ NEW DRUG ADDON: CPT

## 2019-06-11 PROCEDURE — 81001 URINALYSIS AUTO W/SCOPE: CPT | Performed by: PHYSICIAN ASSISTANT

## 2019-06-11 PROCEDURE — 25000128 H RX IP 250 OP 636: Performed by: PHYSICIAN ASSISTANT

## 2019-06-11 PROCEDURE — 96374 THER/PROPH/DIAG INJ IV PUSH: CPT

## 2019-06-11 PROCEDURE — 99285 EMERGENCY DEPT VISIT HI MDM: CPT | Mod: 25

## 2019-06-11 RX ORDER — DIPHENHYDRAMINE HYDROCHLORIDE 50 MG/ML
25 INJECTION INTRAMUSCULAR; INTRAVENOUS ONCE
Status: COMPLETED | OUTPATIENT
Start: 2019-06-11 | End: 2019-06-11

## 2019-06-11 RX ORDER — METHYLPREDNISOLONE SODIUM SUCCINATE 125 MG/2ML
125 INJECTION, POWDER, LYOPHILIZED, FOR SOLUTION INTRAMUSCULAR; INTRAVENOUS ONCE
Status: COMPLETED | OUTPATIENT
Start: 2019-06-11 | End: 2019-06-11

## 2019-06-11 RX ORDER — EPINEPHRINE 1 MG/ML
0.3 INJECTION, SOLUTION, CONCENTRATE INTRAVENOUS ONCE
Status: COMPLETED | OUTPATIENT
Start: 2019-06-11 | End: 2019-06-11

## 2019-06-11 RX ORDER — PREDNISONE 20 MG/1
TABLET ORAL
Qty: 10 TABLET | Refills: 0 | Status: SHIPPED | OUTPATIENT
Start: 2019-06-11 | End: 2019-06-20

## 2019-06-11 RX ORDER — EPINEPHRINE 0.3 MG/.3ML
0.3 INJECTION SUBCUTANEOUS
Qty: 1 EACH | Refills: 1 | Status: SHIPPED | OUTPATIENT
Start: 2019-06-11 | End: 2020-04-09

## 2019-06-11 RX ADMIN — RANITIDINE 50 MG: 25 INJECTION, SOLUTION INTRAMUSCULAR; INTRAVENOUS at 11:09

## 2019-06-11 RX ADMIN — EPINEPHRINE 0.3 MG: 1 INJECTION INTRAMUSCULAR; INTRAVENOUS; SUBCUTANEOUS at 10:55

## 2019-06-11 RX ADMIN — METHYLPREDNISOLONE SODIUM SUCCINATE 125 MG: 125 INJECTION, POWDER, FOR SOLUTION INTRAMUSCULAR; INTRAVENOUS at 11:04

## 2019-06-11 RX ADMIN — DIPHENHYDRAMINE HYDROCHLORIDE 25 MG: 50 INJECTION, SOLUTION INTRAMUSCULAR; INTRAVENOUS at 10:59

## 2019-06-11 ASSESSMENT — ENCOUNTER SYMPTOMS
TROUBLE SWALLOWING: 0
SHORTNESS OF BREATH: 0
HEMATURIA: 0
COLOR CHANGE: 1
FACIAL SWELLING: 0
DYSURIA: 0

## 2019-06-11 NOTE — ED PROVIDER NOTES
History     Chief Complaint:  Allergic Reaction    HPI   Amalia Newman is a 49 year old female who presents for evaluation of allergic reaction.  Patient states that last night she was eating shrimp, walnut, with a honey sauce when she suddenly developed entire body itchiness.  She states that she quickly took a shower and applied lotion with resolution of symptoms.  However this morning she took her Bactrim and again ate the shrimp dish and immediately had a similar reaction but now associated with severe itchiness, erythroderma and mild shortness of breath, prompting her evaluation.  She was recently hospitalized for an ESBL UTI sepsis and was discharged 1 week ago on Bactrim.  She has been taking the medications as prescribed and has not had any complications since and her urinary symptoms have improved immensely.  She denies any history of allergic reactions in the past and has never been allergic to any of the ingredients the the food.  She denies any associated chest pain, trouble breathing, trouble swallowing, lip/tongue swelling, or eyelid swelling.  The patient states that she feels jittery, but overall feels better after epinephrine was administered.  She has no further concerns at this time.      Allergies:  Vicodin     Medications:    Cardizem CD  Lisinopril  Bactrim DS  Ubiquinol    Past Medical History:    E. Coli sepsis  Hypertension  Hyperglyceridemia  Tachycardia    Past Surgical History:    Dilation and Curettage  [place] teeth extraction  Open Hysterectomy radical    Family History:    Hypertension  Lipids  Cerebrovascular Disease  CAD  Diabetes  Vascular Disease    Social History:  Smoking status: No  Alcohol use: Yes, rare  Drug use: No  PCP: Amina Olivares  Marital Status:   [2]    Review of Systems   HENT: Negative for facial swelling and trouble swallowing.    Respiratory: Negative for shortness of breath.    Cardiovascular: Positive for chest pain.   Genitourinary: Negative  for dysuria and hematuria.   Skin: Positive for color change and rash.   All other systems reviewed and are negative.    Physical Exam     Patient Vitals for the past 24 hrs:   BP Temp Temp src Pulse Heart Rate Resp SpO2   06/11/19 1215 141/81 -- -- 92 93 -- 99 %   06/11/19 1145 147/75 -- -- 100 103 20 99 %   06/11/19 1130 146/76 -- -- 101 94 -- 97 %   06/11/19 1115 139/73 -- -- 98 101 -- 97 %   06/11/19 1100 158/80 99.1  F (37.3  C) Oral 107 107 20 99 %     Physical Exam  General: Resting comfortably.  Alert and oriented.   Head:  The scalp, face, and head appear normal  Eyes:  Conjunctivae and sclerae are normal    ENT:    The oropharynx is normal    Uvula is in the midline     Moist mucous membranes.  Structures are symmetric.    No lip or tongue swelling.  No airway compromise.    No oral lesions. No mucous membranes involvement.   Neck:  Normal range of motion    No lymphadenopathy  CV:  Mild tachycardia with regular rhythm     Normal S1/S2    Peripheral pulses intact in upper extremity.  No peripheral edema.  Resp:  Lungs are clear to auscultation    Non-labored    No rales or wheezing     No respiratory distress.  GI:  Abdomen is soft, non-distended    No abdominal tenderness   MS:  Normal muscular tone   Skin:  No rash or acute skin lesions noted   Neuro: Speech is normal and fluent.      Emergency Department Course   Laboratory:  UA (1219): Moderate Blood, Protein Albumin 10, WBC/HPF 7 (H), RBC/HPF 40 (H), Few Backterial, Squamous epithelial/ HPF 22 (H), Present Mucous, Hyaline Cast 14 (H) o/w Negative  UA (1424): Moderate Blood, RBC/HPF (6), Few Bacteria, Present Mucous o/w Negative    Interventions:  1055: Adrenalin 0.3 mg Subcutaneous  1059: Benadryl 125 mg IV  1104: Solumedrol 125 mg IV  1109: Zantac 50 mg IV    Emergency Department Course:  Past medical records, nursing notes, and vitals reviewed.  I performed an exam of the patient and obtained history, as documented above.   IV inserted and blood  drawn.    1325: I rechecked the patient. Explained findings to the patient.    1415:  I rechecked the patient. Findings and plan explained to the Patient. Patient discharged home with instructions regarding supportive care, medications, and reasons to return. The importance of close follow-up was reviewed.     1426: I talked to Dr. Motta of ID in consultion    She will be discharged home with epipen and prednisone    Impression & Plan    Medical Decision Making:  Amalia Newman is a 49 year old female who presents for evaluation of allergic reaction.  She notes she ate a shrimp/wanut dish and had sudden onset of itchiness, erythroderma, and shortness of breath this morning.  A similar reaction happened last night.  Of note, the patient is also on Bactrim following a recent admission for ESBL UTI sepsis.  Patient presents mildly tachycardic, but otherwise vitally stable.  Upon my evaluation, the patient had received epinephrine given her erythrodermic presentation.  Upon my evaluation, the patient does not have any immediate airway compromise, and epinephrine is are given prior to my evaualtion.  Solu-Medrol, Benadryl, and Zantac was added.  The rash was no classic for a drug rash, but nonetheless this was considered as a definite possibility.  I did consider Graves-Low/erythema multiforme in this patient, but this is not consistent. There is no mucous membrane involvement. UA was checked and appeared contaminated and therefore a second one was obtained. This is negative for markers of infection. Patient was observed here for 3 and half hours with continued improvement.  I did touch base with Dr. Kenny of infectious disease regarding her UTI treatment and possible Bactrim reaction.  He suggested ceasing treatment for possible Bactrim reaction and did not think further treatment would be indicated. Overall I believe she safe to discharge home.  She is given a prescription for EpiPen, prednisone, and was  asked to take Benadryl and Zantac over the next couple of days.  She will follow-up with her primary care doctor in 2 days for recheck.  She will return immediately for any worsening rash, mucous membrane involvement, shortness of breath, or any other concerns.  All questions were answered prior to discharge.  The patient understands and agrees with plan.    Diagnosis:    ICD-10-CM   1. Allergic reaction, initial encounter T78.40XA     Disposition:  discharged to home    Discharge Medications:  Medication List   Started    EPINEPHrine 0.3 MG/0.3ML injection 2-pack  Commonly known as:  EPIPEN/ADRENACLICK/or ANY BX GENERIC EQUIV  0.3 mg, Intramuscular, ONCE PRN     predniSONE 20 MG tablet  Commonly known as:  DELTASONE  Take two tablets (= 40mg) each day for 5 (five) days       Caitlyn Bell  6/11/2019   [facility] EMERGENCY DEPARTMENT       Caitlyn Bell PA-C  06/11/19 0919

## 2019-06-11 NOTE — ED TRIAGE NOTES
Pt comes in with possible allergic reaction. Pt states feeling itchy since last night after eating something. Pt took a shower and was better. Pt thought it was mosquito bites because she was sitting outside. then pt ate the same food this am and started having itching again, hives redness to skin, swelling to eyes and hands, SOB and heart beating fast. Pt has no known allergy.

## 2019-06-11 NOTE — ED AVS SNAPSHOT
[facility] Emergency Department  [address]  [facility] [address]  Phone:  [phone]  Fax:  [phone]                                    Amalia Newman   MRN: [id number]    Department:  [facility] Emergency Department   Date of Visit:  6/11/2019           After Visit Summary Signature Page    I have received my discharge instructions, and my questions have been answered. I have discussed any challenges I see with this plan with the nurse or doctor.    ..........................................................................................................................................  Patient/Patient Representative Signature      ..........................................................................................................................................  Patient Representative Print Name and Relationship to Patient    ..................................................               ................................................  Date                                   Time    ..........................................................................................................................................  Reviewed by Signature/Title    ...................................................              ..............................................  Date                                               Time          22EPIC Rev 08/18

## 2019-06-20 ENCOUNTER — OFFICE VISIT (OUTPATIENT)
Dept: INTERNAL MEDICINE | Facility: CLINIC | Age: 49
End: 2019-06-20
Payer: COMMERCIAL

## 2019-06-20 VITALS
OXYGEN SATURATION: 99 % | BODY MASS INDEX: 33.65 KG/M2 | SYSTOLIC BLOOD PRESSURE: 131 MMHG | RESPIRATION RATE: 18 BRPM | HEIGHT: 63 IN | HEART RATE: 76 BPM | DIASTOLIC BLOOD PRESSURE: 81 MMHG | WEIGHT: 189.9 LBS | TEMPERATURE: 98.1 F

## 2019-06-20 DIAGNOSIS — T78.40XD ALLERGIC REACTION, SUBSEQUENT ENCOUNTER: Primary | ICD-10-CM

## 2019-06-20 PROCEDURE — 99213 OFFICE O/P EST LOW 20 MIN: CPT | Performed by: NURSE PRACTITIONER

## 2019-06-20 ASSESSMENT — MIFFLIN-ST. JEOR: SCORE: 1450.51

## 2019-06-20 NOTE — PROGRESS NOTES
"Subjective     Amalia Newman is a 49 year old female who presents to clinic today for the following health issues:  Allergic reaction  HPI   ED/UC Followup:    Facility:  [facility]  Date of visit: 06/11/2019  Reason for visit: Allergic reaction  Current Status: better, has not used Epipen           Patient Active Problem List   Diagnosis     Pure hyperglyceridemia     CARDIOVASCULAR SCREENING; LDL GOAL LESS THAN 130     Abnormal glucose     Benign essential hypertension     Sepsis (H)     Infection due to ESBL-producing Escherichia coli     E. coli sepsis (H)     Sepsis due to gram-negative UTI (H)     Past Surgical History:   Procedure Laterality Date     DILATION AND CURETTAGE  1996     ENT SURGERY  2013    wisdom teeth      HYSTERECTOMY RADICAL  2006    open hysterectomy ; ovaries are left in        Social History     Tobacco Use     Smoking status: Never Smoker     Smokeless tobacco: Never Used   Substance Use Topics     Alcohol use: Yes     Comment: \"very rare\"     Family History   Problem Relation Age of Onset     Hypertension Mother      Lipids Mother      Cerebrovascular Disease Mother      C.A.D. Father         58     Hypertension Father      Diabetes Father      Vascular Disease Father         PVD     Hypertension Brother      Cancer Paternal Grandmother         Abdominal     Cancer Paternal Grandfather         Liver     Hypertension Maternal Grandmother          Current Outpatient Medications   Medication Sig Dispense Refill     ascorbic acid (VITAMIN C) 500 MG tablet Take 2 tablets (1,000 mg) by mouth daily 30 tablet      diltiazem ER COATED BEADS (CARDIZEM CD) 360 MG 24 hr capsule Take 1 capsule (360 mg) by mouth daily 30 capsule 11     EPINEPHrine (EPIPEN/ADRENACLICK/OR ANY BX GENERIC EQUIV) 0.3 MG/0.3ML injection 2-pack Inject 0.3 mLs (0.3 mg) into the muscle once as needed for anaphylaxis 1 each 1     lisinopril (PRINIVIL/ZESTRIL) 10 MG tablet Take 1 tablet (10 mg) by mouth daily 30 tablet " "11     Ubiquinol (QUNOL COQ10/UBIQUINOL/ANDRES) 100 MG CAPS Take 100 mg by mouth daily        BP Readings from Last 3 Encounters:   06/20/19 131/81   06/11/19 141/81   06/07/19 110/40    Wt Readings from Last 3 Encounters:   06/20/19 86.1 kg (189 lb 14.4 oz)   06/07/19 83 kg (183 lb)   05/30/19 83 kg (182 lb 15.7 oz)                      Reviewed and updated as needed this visit by Provider         Review of Systems   ROS COMP: Constitutional, HEENT, cardiovascular, pulmonary, gi and gu systems are negative, except as otherwise noted.      Objective    LMP 12/06/2005   There is no height or weight on file to calculate BMI.  Physical Exam   GENERAL: healthy, alert and no distress  NECK: no adenopathy, no asymmetry, masses, or scars and thyroid normal to palpation  RESP: lungs clear to auscultation - no rales, rhonchi or wheezes  CV: regular rate and rhythm, normal S1 S2, no S3 or S4, no murmur, click or rub, no peripheral edema and peripheral pulses strong  SKIN: no suspicious lesions or rashes            Assessment & Plan       ICD-10-CM    1. Allergic reaction, subsequent encounter T78.40XD ALLERGY/ASTHMA ADULT REFERRAL        BMI:   Estimated body mass index is 33.64 kg/m  as calculated from the following:    Height as of this encounter: 1.6 m (5' 3\").    Weight as of this encounter: 86.1 kg (189 lb 14.4 oz).               Stacey Desai NP  [facility]        "

## 2019-06-24 ENCOUNTER — HOSPITAL ENCOUNTER (INPATIENT)
Facility: CLINIC | Age: 49
LOS: 4 days | Discharge: HOME | DRG: 872 | End: 2019-06-28
Attending: EMERGENCY MEDICINE | Admitting: INTERNAL MEDICINE
Payer: COMMERCIAL

## 2019-06-24 ENCOUNTER — OFFICE VISIT (OUTPATIENT)
Dept: URGENT CARE | Facility: URGENT CARE | Age: 49
End: 2019-06-24
Payer: COMMERCIAL

## 2019-06-24 ENCOUNTER — TELEPHONE (OUTPATIENT)
Dept: INTERNAL MEDICINE | Facility: CLINIC | Age: 49
End: 2019-06-24

## 2019-06-24 VITALS
DIASTOLIC BLOOD PRESSURE: 58 MMHG | HEART RATE: 105 BPM | SYSTOLIC BLOOD PRESSURE: 138 MMHG | RESPIRATION RATE: 16 BRPM | WEIGHT: 188 LBS | OXYGEN SATURATION: 99 % | BODY MASS INDEX: 33.3 KG/M2 | TEMPERATURE: 100.9 F

## 2019-06-24 DIAGNOSIS — Z16.12 INFECTION DUE TO ESBL-PRODUCING ESCHERICHIA COLI: ICD-10-CM

## 2019-06-24 DIAGNOSIS — R50.9 FEVER CHILLS: ICD-10-CM

## 2019-06-24 DIAGNOSIS — E87.1 HYPONATREMIA: ICD-10-CM

## 2019-06-24 DIAGNOSIS — R82.90 NONSPECIFIC FINDING ON EXAMINATION OF URINE: ICD-10-CM

## 2019-06-24 DIAGNOSIS — R52 BODY ACHES: ICD-10-CM

## 2019-06-24 DIAGNOSIS — N12 PYELONEPHRITIS: ICD-10-CM

## 2019-06-24 DIAGNOSIS — R35.0 URINARY FREQUENCY: Primary | ICD-10-CM

## 2019-06-24 DIAGNOSIS — N39.0 URINARY TRACT INFECTION WITH HEMATURIA, SITE UNSPECIFIED: ICD-10-CM

## 2019-06-24 DIAGNOSIS — R31.9 URINARY TRACT INFECTION WITH HEMATURIA, SITE UNSPECIFIED: ICD-10-CM

## 2019-06-24 DIAGNOSIS — A49.8 INFECTION DUE TO ESBL-PRODUCING ESCHERICHIA COLI: ICD-10-CM

## 2019-06-24 LAB
ALBUMIN SERPL-MCNC: 3.1 G/DL (ref 3.4–5)
ALBUMIN UR-MCNC: 100 MG/DL
ALP SERPL-CCNC: 41 U/L (ref 40–150)
ALT SERPL W P-5'-P-CCNC: 28 U/L (ref 0–50)
ANION GAP SERPL CALCULATED.3IONS-SCNC: 7 MMOL/L (ref 3–14)
APPEARANCE UR: ABNORMAL
AST SERPL W P-5'-P-CCNC: 18 U/L (ref 0–45)
BACTERIA #/AREA URNS HPF: ABNORMAL /HPF
BASOPHILS # BLD AUTO: 0 10E9/L (ref 0–0.2)
BASOPHILS NFR BLD AUTO: 0.2 %
BILIRUB SERPL-MCNC: 0.3 MG/DL (ref 0.2–1.3)
BILIRUB UR QL STRIP: NEGATIVE
BUN SERPL-MCNC: 18 MG/DL (ref 7–30)
CALCIUM SERPL-MCNC: 9.7 MG/DL (ref 8.5–10.1)
CHLORIDE SERPL-SCNC: 98 MMOL/L (ref 94–109)
CO2 SERPL-SCNC: 25 MMOL/L (ref 20–32)
COLOR UR AUTO: YELLOW
CREAT SERPL-MCNC: 0.93 MG/DL (ref 0.52–1.04)
DIFFERENTIAL METHOD BLD: ABNORMAL
EOSINOPHIL # BLD AUTO: 0 10E9/L (ref 0–0.7)
EOSINOPHIL NFR BLD AUTO: 0.1 %
ERYTHROCYTE [DISTWIDTH] IN BLOOD BY AUTOMATED COUNT: 13.1 % (ref 10–15)
GFR SERPL CREATININE-BSD FRML MDRD: 72 ML/MIN/{1.73_M2}
GLUCOSE SERPL-MCNC: 113 MG/DL (ref 70–99)
GLUCOSE UR STRIP-MCNC: NEGATIVE MG/DL
HCT VFR BLD AUTO: 33.3 % (ref 35–47)
HGB BLD-MCNC: 11.3 G/DL (ref 11.7–15.7)
HGB UR QL STRIP: ABNORMAL
HYALINE CASTS #/AREA URNS LPF: ABNORMAL /LPF
KETONES UR STRIP-MCNC: NEGATIVE MG/DL
LACTATE BLD-SCNC: 0.7 MMOL/L (ref 0.7–2)
LEUKOCYTE ESTERASE UR QL STRIP: ABNORMAL
LYMPHOCYTES # BLD AUTO: 0.9 10E9/L (ref 0.8–5.3)
LYMPHOCYTES NFR BLD AUTO: 6.1 %
MCH RBC QN AUTO: 31.4 PG (ref 26.5–33)
MCHC RBC AUTO-ENTMCNC: 33.9 G/DL (ref 31.5–36.5)
MCV RBC AUTO: 93 FL (ref 78–100)
MONOCYTES # BLD AUTO: 1.2 10E9/L (ref 0–1.3)
MONOCYTES NFR BLD AUTO: 8.4 %
MUCOUS THREADS #/AREA URNS LPF: PRESENT /LPF
NEUTROPHILS # BLD AUTO: 12 10E9/L (ref 1.6–8.3)
NEUTROPHILS NFR BLD AUTO: 85.2 %
NITRATE UR QL: NEGATIVE
NON-SQ EPI CELLS #/AREA URNS LPF: ABNORMAL /LPF
PH UR STRIP: 5 PH (ref 5–7)
PLATELET # BLD AUTO: 171 10E9/L (ref 150–450)
POTASSIUM SERPL-SCNC: 3.9 MMOL/L (ref 3.4–5.3)
PROT SERPL-MCNC: 7.1 G/DL (ref 6.8–8.8)
RBC # BLD AUTO: 3.6 10E12/L (ref 3.8–5.2)
RBC #/AREA URNS AUTO: ABNORMAL /HPF
SODIUM SERPL-SCNC: 130 MMOL/L (ref 133–144)
SOURCE: ABNORMAL
SP GR UR STRIP: >1.03 (ref 1–1.03)
UROBILINOGEN UR STRIP-ACNC: 1 EU/DL (ref 0.2–1)
WBC # BLD AUTO: 14.1 10E9/L (ref 4–11)
WBC #/AREA URNS AUTO: ABNORMAL /HPF

## 2019-06-24 PROCEDURE — 96365 THER/PROPH/DIAG IV INF INIT: CPT

## 2019-06-24 PROCEDURE — 25000128 H RX IP 250 OP 636: Performed by: INTERNAL MEDICINE

## 2019-06-24 PROCEDURE — 80053 COMPREHEN METABOLIC PANEL: CPT | Performed by: PHYSICIAN ASSISTANT

## 2019-06-24 PROCEDURE — 12000000 ZZH R&B MED SURG/OB

## 2019-06-24 PROCEDURE — 87086 URINE CULTURE/COLONY COUNT: CPT | Performed by: PHYSICIAN ASSISTANT

## 2019-06-24 PROCEDURE — 36415 COLL VENOUS BLD VENIPUNCTURE: CPT

## 2019-06-24 PROCEDURE — 81001 URINALYSIS AUTO W/SCOPE: CPT | Performed by: PHYSICIAN ASSISTANT

## 2019-06-24 PROCEDURE — 87040 BLOOD CULTURE FOR BACTERIA: CPT | Performed by: EMERGENCY MEDICINE

## 2019-06-24 PROCEDURE — 25800030 ZZH RX IP 258 OP 636: Performed by: EMERGENCY MEDICINE

## 2019-06-24 PROCEDURE — 96366 THER/PROPH/DIAG IV INF ADDON: CPT

## 2019-06-24 PROCEDURE — 96361 HYDRATE IV INFUSION ADD-ON: CPT

## 2019-06-24 PROCEDURE — 99222 1ST HOSP IP/OBS MODERATE 55: CPT | Mod: AI | Performed by: INTERNAL MEDICINE

## 2019-06-24 PROCEDURE — 99207 ZZC OFFICE-HOSPITAL ADMIT: CPT | Performed by: PHYSICIAN ASSISTANT

## 2019-06-24 PROCEDURE — 25000128 H RX IP 250 OP 636: Performed by: EMERGENCY MEDICINE

## 2019-06-24 PROCEDURE — 83605 ASSAY OF LACTIC ACID: CPT | Performed by: EMERGENCY MEDICINE

## 2019-06-24 PROCEDURE — 25000132 ZZH RX MED GY IP 250 OP 250 PS 637: Performed by: EMERGENCY MEDICINE

## 2019-06-24 PROCEDURE — 85025 COMPLETE CBC W/AUTO DIFF WBC: CPT | Performed by: PHYSICIAN ASSISTANT

## 2019-06-24 PROCEDURE — 99285 EMERGENCY DEPT VISIT HI MDM: CPT | Mod: 25

## 2019-06-24 PROCEDURE — 36415 COLL VENOUS BLD VENIPUNCTURE: CPT | Performed by: EMERGENCY MEDICINE

## 2019-06-24 RX ORDER — MEROPENEM 1 G/1
1 INJECTION, POWDER, FOR SOLUTION INTRAVENOUS EVERY 8 HOURS
Status: DISCONTINUED | OUTPATIENT
Start: 2019-06-25 | End: 2019-06-28 | Stop reason: HOSPADM

## 2019-06-24 RX ORDER — DILTIAZEM HYDROCHLORIDE 180 MG/1
360 CAPSULE, COATED, EXTENDED RELEASE ORAL DAILY
Status: DISCONTINUED | OUTPATIENT
Start: 2019-06-25 | End: 2019-06-28 | Stop reason: HOSPADM

## 2019-06-24 RX ORDER — IBUPROFEN 600 MG/1
600 TABLET, FILM COATED ORAL ONCE
Status: COMPLETED | OUTPATIENT
Start: 2019-06-24 | End: 2019-06-24

## 2019-06-24 RX ORDER — SODIUM CHLORIDE, SODIUM LACTATE, POTASSIUM CHLORIDE, CALCIUM CHLORIDE 600; 310; 30; 20 MG/100ML; MG/100ML; MG/100ML; MG/100ML
INJECTION, SOLUTION INTRAVENOUS CONTINUOUS
Status: DISCONTINUED | OUTPATIENT
Start: 2019-06-24 | End: 2019-06-24

## 2019-06-24 RX ORDER — OXYCODONE HYDROCHLORIDE 5 MG/1
5 TABLET ORAL EVERY 4 HOURS PRN
Status: DISCONTINUED | OUTPATIENT
Start: 2019-06-24 | End: 2019-06-28 | Stop reason: HOSPADM

## 2019-06-24 RX ORDER — AMOXICILLIN 250 MG
2 CAPSULE ORAL 2 TIMES DAILY PRN
Status: DISCONTINUED | OUTPATIENT
Start: 2019-06-24 | End: 2019-06-28 | Stop reason: HOSPADM

## 2019-06-24 RX ORDER — ACETAMINOPHEN 650 MG/1
650 SUPPOSITORY RECTAL EVERY 4 HOURS PRN
Status: DISCONTINUED | OUTPATIENT
Start: 2019-06-24 | End: 2019-06-28 | Stop reason: HOSPADM

## 2019-06-24 RX ORDER — ASCORBIC ACID 500 MG
1000 TABLET ORAL DAILY
Status: DISCONTINUED | OUTPATIENT
Start: 2019-06-25 | End: 2019-06-28 | Stop reason: HOSPADM

## 2019-06-24 RX ORDER — PROCHLORPERAZINE 25 MG
25 SUPPOSITORY, RECTAL RECTAL EVERY 12 HOURS PRN
Status: DISCONTINUED | OUTPATIENT
Start: 2019-06-24 | End: 2019-06-28 | Stop reason: HOSPADM

## 2019-06-24 RX ORDER — CEFTRIAXONE SODIUM 1 G
1 VIAL (EA) INJECTION ONCE
Status: DISCONTINUED | OUTPATIENT
Start: 2019-06-24 | End: 2019-06-24

## 2019-06-24 RX ORDER — PROCHLORPERAZINE MALEATE 5 MG
10 TABLET ORAL EVERY 6 HOURS PRN
Status: DISCONTINUED | OUTPATIENT
Start: 2019-06-24 | End: 2019-06-28 | Stop reason: HOSPADM

## 2019-06-24 RX ORDER — SODIUM CHLORIDE 9 MG/ML
INJECTION, SOLUTION INTRAVENOUS CONTINUOUS
Status: DISCONTINUED | OUTPATIENT
Start: 2019-06-24 | End: 2019-06-25

## 2019-06-24 RX ORDER — ONDANSETRON 4 MG/1
4 TABLET, ORALLY DISINTEGRATING ORAL EVERY 6 HOURS PRN
Status: DISCONTINUED | OUTPATIENT
Start: 2019-06-24 | End: 2019-06-28 | Stop reason: HOSPADM

## 2019-06-24 RX ORDER — ACETAMINOPHEN 325 MG/1
650 TABLET ORAL EVERY 4 HOURS PRN
Status: DISCONTINUED | OUTPATIENT
Start: 2019-06-24 | End: 2019-06-28 | Stop reason: HOSPADM

## 2019-06-24 RX ORDER — MEROPENEM 1 G/1
1 INJECTION, POWDER, FOR SOLUTION INTRAVENOUS ONCE
Status: COMPLETED | OUTPATIENT
Start: 2019-06-24 | End: 2019-06-24

## 2019-06-24 RX ORDER — NALOXONE HYDROCHLORIDE 0.4 MG/ML
.1-.4 INJECTION, SOLUTION INTRAMUSCULAR; INTRAVENOUS; SUBCUTANEOUS
Status: DISCONTINUED | OUTPATIENT
Start: 2019-06-24 | End: 2019-06-28 | Stop reason: HOSPADM

## 2019-06-24 RX ORDER — ONDANSETRON 2 MG/ML
4 INJECTION INTRAMUSCULAR; INTRAVENOUS EVERY 6 HOURS PRN
Status: DISCONTINUED | OUTPATIENT
Start: 2019-06-24 | End: 2019-06-28 | Stop reason: HOSPADM

## 2019-06-24 RX ORDER — BISACODYL 10 MG
10 SUPPOSITORY, RECTAL RECTAL DAILY PRN
Status: DISCONTINUED | OUTPATIENT
Start: 2019-06-24 | End: 2019-06-28 | Stop reason: HOSPADM

## 2019-06-24 RX ORDER — AMOXICILLIN 250 MG
1 CAPSULE ORAL 2 TIMES DAILY PRN
Status: DISCONTINUED | OUTPATIENT
Start: 2019-06-24 | End: 2019-06-28 | Stop reason: HOSPADM

## 2019-06-24 RX ORDER — POLYETHYLENE GLYCOL 3350 17 G/17G
17 POWDER, FOR SOLUTION ORAL DAILY PRN
Status: DISCONTINUED | OUTPATIENT
Start: 2019-06-24 | End: 2019-06-28 | Stop reason: HOSPADM

## 2019-06-24 RX ORDER — LIDOCAINE 40 MG/G
CREAM TOPICAL
Status: DISCONTINUED | OUTPATIENT
Start: 2019-06-24 | End: 2019-06-28 | Stop reason: HOSPADM

## 2019-06-24 RX ADMIN — SODIUM CHLORIDE 1000 ML: 9 INJECTION, SOLUTION INTRAVENOUS at 22:39

## 2019-06-24 RX ADMIN — IBUPROFEN 600 MG: 600 TABLET, FILM COATED ORAL at 18:54

## 2019-06-24 RX ADMIN — SODIUM CHLORIDE, POTASSIUM CHLORIDE, SODIUM LACTATE AND CALCIUM CHLORIDE 2559 ML: 600; 310; 30; 20 INJECTION, SOLUTION INTRAVENOUS at 19:27

## 2019-06-24 RX ADMIN — MEROPENEM 1 G: 1 INJECTION INTRAVENOUS at 19:47

## 2019-06-24 ASSESSMENT — ENCOUNTER SYMPTOMS
FEVER: 1
FREQUENCY: 0
CHILLS: 0
FLANK PAIN: 0
VOMITING: 0
WEAKNESS: 1
APPETITE CHANGE: 1
COUGH: 0
BACK PAIN: 0
ARTHRALGIAS: 0
DIARRHEA: 0
FATIGUE: 1
HEMATURIA: 0
DIFFICULTY URINATING: 0
DYSURIA: 0

## 2019-06-24 ASSESSMENT — MIFFLIN-ST. JEOR
SCORE: 1426.01
SCORE: 1456.41

## 2019-06-24 NOTE — TELEPHONE ENCOUNTER
Patient calling.  For the last 1 1/2 days, c/o feeling run down, night sweats, and decreased appetite.  States she took temp today (after taking Advil 400mg) and was 99.9.    History of ESBL.    She is currently at work and cannot leave until after 3:30.  Wanted an appointment around 4:00p today.  Advised patient no appointment available and offered to check other clinics for an appointment at that time.  She states she will go to [facility] urgent care for eval after work today.

## 2019-06-24 NOTE — ED TRIAGE NOTES
Patient sent from clinic for fatigue, fever, run down, poor appetite since Saturday. ABC's intact.

## 2019-06-24 NOTE — PATIENT INSTRUCTIONS
Patient Education     Discharge Instructions for Hyponatremia  You were diagnosed with hyponatremia, which means your blood level of sodium (salt) is too low. Salt is needed for the body and brain to work. Very low blood levels of sodium can be fatal. Symptoms can include headache, confusion, fatigue, muscle cramps, hallucinations, seizures, and coma. You have been treated to raise your blood levels of sodium. These instructions will help you care for yourself at home as you have been instructed.  Home care    Limit your intake of fluids. Drink only the amounts directed by your healthcare provider.    Ask your healthcare provider what you should use to replace fluids if you are throwing up.    Keep all follow-up appointments. Your provider needs to watch your condition closely.  To help prevent hyponatremia:    Take all medicines exactly as directed. Certain medicines can lower blood sodium levels.    If you have done something that makes you sweat a lot, drink fluids that contain salt and other electrolytes.     Tell all healthcare providers what medicines you take. Mention all prescription and over-the-counter drugs and herbs.    Have your sodium levels checked often. This is vital if you take a diuretic (medicine that helps your body get rid of water).  Follow-up  Follow up with your healthcare provider, or as advised.   When to call your healthcare provider  Call your provider right away if you have any of the following:    Severe tiredness    Fainting    Dizziness    Loss of appetite    Nausea or vomiting    Confusion or forgetfulness    Muscle spasms, cramping, or twitching    Seizures    Gait disturbances   Date Last Reviewed: 6/1/2017 2000-2018 [employer]. [address] [place] [address]. All rights reserved. This information is not intended as a substitute for professional medical care. Always follow your healthcare professional's instructions.           Patient Education      Hyponatremia  Hyponatremia means low sodium levels in the blood. This condition most often occurs after prolonged vomiting or diarrhea, which causes your body to lose too much water and sodium. It can also result from drinking excess amounts of water or the use of diuretics (water pills). Rarely, it can be associated with disorders of your endocrine system, as side effects of illicit drug use (ecstasy), as a complication of some cancers especially small cell lung cancer, or as a complication of renal and liver disease or heart failure.  Mild hyponatremia causes no symptoms. It is only discovered with a blood test. As sodium levels in the blood decreases, symptoms begin to appear. This includes weakness, confusion, muscle cramping and seizures.  Home care    Reduce your daily water intake until the problem is corrected.    If you have been taking diuretics, you may be asked to stop taking them for a short time.    If you are having symptoms of weakness or confusion, do not drive or operate dangerous machinery until symptoms resolve.    If your sodium levels are too low to be managed at home with the above recommendations, you will be asked to go to the hospital to have your sodium replaced through your vein.  Follow-up care  Follow up with your healthcare provider for a repeat blood test within the next week, or as advised.  When to seek medical advice  Call your healthcare provider if any of the following occur:    Increasing weakness    Dizziness    Irregular heartbeat, extra beats or very fast heart rate    Increasing confusion    Fainting or loss of consciousness    Seizure  Date Last Reviewed: 7/1/2017 2000-2018 [employer]. [address] [place] [address], [place] [address]. All rights reserved. This information is not intended as a substitute for professional medical care. Always follow your healthcare professional's instructions.

## 2019-06-24 NOTE — LETTER
6/26/2019     Amalia Newman is currently admitted to [facility]. She will likely remain hospitalized for 1-2 more days.   Please call if you have questions.     Sincerely,    David Crane MD  [facility] ORTHO SPINE  [address] [place] [address]  Phone: [phone]  Fax: [phone]

## 2019-06-24 NOTE — ED PROVIDER NOTES
History     Chief Complaint:  Fever & Fatigue    HPI   Amalia Newman is a 49 year old female with a history of ESBL E coli who presents with a fever and fatigue. The patient was admitted to the hospital about 1 month ago from 5/30 to 6/3 for ESBL bacteremia and sepsis presumed from urinary source. She received Merrem while in the hospital before being transitioned to Bactrim at discharge. The patient was seen in the ED about 2 weeks ago for an allergic reaction thought to possibly be secondary to Bactrim. ID was consulted and she was subsequently recommended to stop Bactrim with no indication for further antibiotic treatment at that time.     Two days ago the patient states she started to feel fatigued and generally weak with associated loss of appetite. Today the patient states she additionally developed a fever causing her to present to Urgent Care for evaluation. While at Urgent Care the patient had CBC, CMP, UA and Urine Culture completed; see results below. Given the patient's recent history of ESBL she was sent to the ED for further evaluation and treatment. Here the patient denies any chills, recent cough, vomiting, diarrhea, rashes, arthralgias, or flank or back pain. She does note a cloudy color to her urine, but otherwise any urinary symptoms. She denies any recent travel or any known insect or tick bites. She states she took 2 Advil at 0645 this morning.     See patient's labs from Urgent Care visit earlier today below.     CMP: Sodium 130 (L), Glucose 113 (H), Albumin 3.1 (L), o/w AWNL (Creatinine 0.93)    CBC: WBC 14.1 (H), HGB 11.3 (L), Absolute Neutrophil 12.0 (H), o/w AWNL ()    UA: Protein Albumin Urine 100 (A), Blood Urine Small (A), Leukocyte Esterase Urine Moderate (A), WBC Urine  (A), RBC Urine 2-5 (A), Hyaline Casts 2-5 (A), Squamous Epithelial / LPF Urine Many (A), Bacteria Urine Many (A), Mucous Urine Present (A), o/w Negative    Urine Culture Aerobic Bacterial:  "Pending    Allergies:  Bactrim  Vicodin     Medications:    Vitamin C  Cardizem CD  Epipen 2 pack  Lisinopril  Ubiquinol 100 mg CAPS    Past Medical History:    E coli sepsis  Hypertension  Infection due to ESBL-producing Escherichia coli  Sepsis due to gram-negative UTI   Pure hyperglyceridemia  Hypertension    Past Surgical History:    Dilation and Curettage  [place] Teet Extraction  Radical Hysterectomy    Family History:    Hypertension  Lipids  Cerebrovascular Disease  CAD  Diabetes  Periferal vascular disease  Abdominal cancer  Liver Cancer    Social History:  Smoking Status: Never Smoker  Alcohol Use: Rarely  Patient presents alone.  Marital Status:       Review of Systems   Constitutional: Positive for appetite change, fatigue and fever. Negative for chills.   Respiratory: Negative for cough.    Gastrointestinal: Negative for diarrhea and vomiting.   Genitourinary: Negative for decreased urine volume, difficulty urinating, dysuria, flank pain, frequency and hematuria.   Musculoskeletal: Negative for arthralgias and back pain.   Skin: Negative for rash.   Neurological: Positive for weakness (generalized).   All other systems reviewed and are negative.    Physical Exam     Patient Vitals for the past 24 hrs:   BP Temp Temp src Pulse Heart Rate Resp SpO2 Height Weight   06/24/19 1945 119/67 -- -- 101 101 -- 91 % -- --   06/24/19 1930 119/65 -- -- 101 107 21 94 % -- --   06/24/19 1900 127/76 -- -- 111 -- -- 92 % -- --   06/24/19 1823 (!) 178/97 102.3  F (39.1  C) Oral 116 -- 20 99 % 1.575 m (5' 2\") 85.3 kg (188 lb)     Physical Exam  General:              Well-nourished              Speaking in full sentences              Appears flushed  Eyes:              Conjunctiva without injection or scleral icterus  ENT:              Moist mucous membranes              Nares patent              Pinnae normal  Neck:              Full ROM              No stiffness appreciated  Resp:              Lungs CTAB             "  No crackles, wheezing or audible rubs              Good air movement  CV:                    Tachycardic rate, regular rhythm              S1 and S2 present              No murmur, gallop or rub  GI:              BS present              Abdomen soft without distention              Non-tender to light and deep palpation                        No CVA tenderness              No guarding or rebound tenderness  Skin:              Warm, dry, well perfused              No rashes or open wounds on exposed skin  MSK:              Moves all extremities              No focal deformities or swelling  Neuro:              Alert              Answers questions appropriately              Moves all extremities equally              Gait stable  Psych:              Normal affect, normal mood    Emergency Department Course     Laboratory:    1839: Lactic acid whole blood: 0.7    Blood Culture x2: Pending    Interventions:    1854: ibuprofen 600 mg PO  1927: LR 2,559 mL IV Bolus  1947: Merrem 1 g IV    Emergency Department Course:  Past medical records, nursing notes, and vitals reviewed.  1829: I performed an exam of the patient and obtained history, as documented above.    IV inserted and blood drawn.    1950: Rechecked the patient, findings and plan explained to the patient, who consents to admission.     2022: Discussed the patient with Dr. Pitt, who will admit the patient to a monitored bed for further observation, evaluation, and treatment.     Impression & Plan      Medical Decision Making:  Amalia Newman is a very pleasant 49-year-old female presenting to the ER from clinic for evaluation of fever and fatigue.  VS on presentation reveal elevated BP, tachycardia, and fever to 102.3.  Previous records reviewed, notable for recent hospitalization for urinary tract infection and subsequent ESBL E. coli bacteremia.  At present, concern is for recurrent urinary tract infection.  Labs and urinalysis obtained from clinic earlier  today I reviewed, and consistent with infection.  Urine culture pending.  W BC count elevated at 14 K.  Lactate returned at 0.7, and patient not meeting criteria for severe sepsis nor septic shock.  Blood cultures x2 obtained and are presently pending.  Based on previous urine culture results, patient was started on meropenem IV.  No other focal source of infection is identified by history or exam.  Pulmonary exam is clear without focal crackles, consolidation, wheezing, and patient denies associated cough.  She has been without vomiting, diarrhea, and exhibits a reassuring abdominal exam, arguing against intra-abdominal process.  No CVA tenderness or reports of back pain to suggest pyelonephritis.  No skin/soft tissue findings or joint pain/swelling to suggest septic arthritis.  Patient is of normal mental status, and demonstrates adequate range of motion about the neck, arguing against meningitis.  Patient will be admitted to the hospital for close monitoring and further treatment.  Questions were answered prior to admission.  She has remained hemodynamically stable condition during her ED course under my care.    Diagnosis:    ICD-10-CM    1. Urinary tract infection with hematuria, site unspecified N39.0 Lactic acid whole blood    R31.9 Blood culture       Disposition:  Admitted to hospital.       Ashley Alford  6/24/2019   [facility] EMERGENCY DEPARTMENT  I, Ashley Alford, am serving as a scribe at 6:29 PM on 6/24/2019 to document services personally performed by Reed Guerrier MD based on my observations and the provider's statements to me.        Reed Guerrier MD  06/25/19 0014

## 2019-06-25 LAB
ANION GAP SERPL CALCULATED.3IONS-SCNC: 7 MMOL/L (ref 3–14)
BACTERIA SPEC CULT: NORMAL
BUN SERPL-MCNC: 11 MG/DL (ref 7–30)
CALCIUM SERPL-MCNC: 8.6 MG/DL (ref 8.5–10.1)
CHLORIDE SERPL-SCNC: 106 MMOL/L (ref 94–109)
CO2 SERPL-SCNC: 25 MMOL/L (ref 20–32)
CREAT SERPL-MCNC: 0.53 MG/DL (ref 0.52–1.04)
ERYTHROCYTE [DISTWIDTH] IN BLOOD BY AUTOMATED COUNT: 12.8 % (ref 10–15)
GFR SERPL CREATININE-BSD FRML MDRD: >90 ML/MIN/{1.73_M2}
GLUCOSE SERPL-MCNC: 117 MG/DL (ref 70–99)
HCT VFR BLD AUTO: 31.2 % (ref 35–47)
HGB BLD-MCNC: 10.3 G/DL (ref 11.7–15.7)
LACTATE BLD-SCNC: 0.5 MMOL/L (ref 0.7–2)
MCH RBC QN AUTO: 31.2 PG (ref 26.5–33)
MCHC RBC AUTO-ENTMCNC: 33 G/DL (ref 31.5–36.5)
MCV RBC AUTO: 95 FL (ref 78–100)
PLATELET # BLD AUTO: 146 10E9/L (ref 150–450)
POTASSIUM SERPL-SCNC: 3.4 MMOL/L (ref 3.4–5.3)
RBC # BLD AUTO: 3.3 10E12/L (ref 3.8–5.2)
SODIUM SERPL-SCNC: 138 MMOL/L (ref 133–144)
SPECIMEN SOURCE: NORMAL
WBC # BLD AUTO: 8.9 10E9/L (ref 4–11)

## 2019-06-25 PROCEDURE — 12000000 ZZH R&B MED SURG/OB

## 2019-06-25 PROCEDURE — 99232 SBSQ HOSP IP/OBS MODERATE 35: CPT | Performed by: INTERNAL MEDICINE

## 2019-06-25 PROCEDURE — 25800030 ZZH RX IP 258 OP 636: Performed by: INTERNAL MEDICINE

## 2019-06-25 PROCEDURE — 83605 ASSAY OF LACTIC ACID: CPT | Performed by: INTERNAL MEDICINE

## 2019-06-25 PROCEDURE — 80048 BASIC METABOLIC PNL TOTAL CA: CPT | Performed by: INTERNAL MEDICINE

## 2019-06-25 PROCEDURE — 85027 COMPLETE CBC AUTOMATED: CPT | Performed by: INTERNAL MEDICINE

## 2019-06-25 PROCEDURE — 25000128 H RX IP 250 OP 636: Performed by: INTERNAL MEDICINE

## 2019-06-25 PROCEDURE — 36415 COLL VENOUS BLD VENIPUNCTURE: CPT | Performed by: INTERNAL MEDICINE

## 2019-06-25 PROCEDURE — 25000132 ZZH RX MED GY IP 250 OP 250 PS 637: Performed by: INTERNAL MEDICINE

## 2019-06-25 RX ORDER — IBUPROFEN 200 MG
200 TABLET ORAL EVERY 6 HOURS PRN
Status: DISCONTINUED | OUTPATIENT
Start: 2019-06-25 | End: 2019-06-28 | Stop reason: HOSPADM

## 2019-06-25 RX ADMIN — SODIUM CHLORIDE: 9 INJECTION, SOLUTION INTRAVENOUS at 00:08

## 2019-06-25 RX ADMIN — ACETAMINOPHEN 650 MG: 325 TABLET, FILM COATED ORAL at 23:38

## 2019-06-25 RX ADMIN — IBUPROFEN 200 MG: 200 TABLET, FILM COATED ORAL at 13:26

## 2019-06-25 RX ADMIN — DILTIAZEM HYDROCHLORIDE 360 MG: 180 CAPSULE, COATED, EXTENDED RELEASE ORAL at 09:06

## 2019-06-25 RX ADMIN — MEROPENEM 1 G: 1 INJECTION, POWDER, FOR SOLUTION INTRAVENOUS at 11:49

## 2019-06-25 RX ADMIN — ACETAMINOPHEN 650 MG: 325 TABLET, FILM COATED ORAL at 11:56

## 2019-06-25 RX ADMIN — ACETAMINOPHEN 650 MG: 325 TABLET, FILM COATED ORAL at 04:46

## 2019-06-25 RX ADMIN — MEROPENEM 1 G: 1 INJECTION, POWDER, FOR SOLUTION INTRAVENOUS at 19:49

## 2019-06-25 RX ADMIN — OXYCODONE HYDROCHLORIDE AND ACETAMINOPHEN 1000 MG: 500 TABLET ORAL at 09:06

## 2019-06-25 RX ADMIN — IBUPROFEN 200 MG: 200 TABLET, FILM COATED ORAL at 19:58

## 2019-06-25 RX ADMIN — MEROPENEM 1 G: 1 INJECTION, POWDER, FOR SOLUTION INTRAVENOUS at 04:39

## 2019-06-25 RX ADMIN — SODIUM CHLORIDE: 9 INJECTION, SOLUTION INTRAVENOUS at 10:50

## 2019-06-25 ASSESSMENT — ACTIVITIES OF DAILY LIVING (ADL)
ADLS_ACUITY_SCORE: 11

## 2019-06-25 NOTE — PROGRESS NOTES
"SUBJECTIVE:   Amalia Newman is a 49 year old female who  presents today for a sepsis. Symptoms of dysuria, urgency and frequency have been going on for 3hour(s).  Hematuria yes .  sudden onsetand moderate and severe.  There is a history of fever, chills, and nausea.   This patient does  have a history of urinary tract infections and recent hospitaliizaton for sepsis. Patient has fever, chills, body aches    Past Medical History:   Diagnosis Date     E. coli sepsis (H) 6/1/2019     Essential hypertension, benign 1996     Infection due to ESBL-producing Escherichia coli 6/1/2019     Pure hyperglyceridemia      Tachycardia      Allergies   Allergen Reactions     Bactrim [Sulfamethoxazole W/Trimethoprim]      No Known Allergies      Vicodin [Hydrocodone-Acetaminophen]      Angry, changes mood.      Social History     Tobacco Use     Smoking status: Never Smoker     Smokeless tobacco: Never Used   Substance Use Topics     Alcohol use: Yes     Comment: \"very rare\"     Family History   Problem Relation Age of Onset     Hypertension Mother      Lipids Mother      Cerebrovascular Disease Mother      C.A.D. Father         58     Hypertension Father      Diabetes Father      Vascular Disease Father         PVD     Hypertension Brother      Cancer Paternal Grandmother         Abdominal     Cancer Paternal Grandfather         Liver     Hypertension Maternal Grandmother        ROS:   CONSTITUTIONAL:POSITIVE  for fever and chills  INTEGUMENTARY/SKIN: NEGATIVE for worrisome rashes, moles or lesions  EYES: NEGATIVE for vision changes or irritation  ENT/MOUTH: NEGATIVE for ear, mouth and throat problems  RESP:NEGATIVE for significant cough or SOB  CV: NEGATIVE for chest pain, palpitations or peripheral edema  GI: NEGATIVE for nausea, abdominal pain, heartburn, or change in bowel habits  : POSITIVE for dysuria and urinary frequency  MUSCULOSKELETAL: POSITIVE for body aches  NEURO: NEGATIVE for weakness, dizziness or " paresthesias    OBJECTIVE:  /58   Pulse 105   Temp 100.9  F (38.3  C) (Tympanic)   Resp 16   Wt 85.3 kg (188 lb)   LMP 12/06/2005   SpO2 99%   BMI 33.30 kg/m    GENERAL APPEARANCE: healthy, alert and no distress  RESP: lungs clear to auscultation - no rales, rhonchi or wheezes  CV: regular rates and rhythm, normal S1 S2, no murmur noted  ABDOMEN:  soft, nontender, no HSM or masses and bowel sounds normal  BACK: No CVA tenderness  GU_female: deferred  Extremities: no peripheral edema or tenderness, peripheral pulses normal  MS:  extremities normal- no gross deformities noted, no erythema, FROM noted in all extremities  SKIN: no suspicious lesions or rashes    Results for orders placed or performed in visit on 06/24/19   UA with Microscopic reflex to Culture   Result Value Ref Range    Color Urine Yellow     Appearance Urine Cloudy     Glucose Urine Negative NEG^Negative mg/dL    Bilirubin Urine Negative NEG^Negative    Ketones Urine Negative NEG^Negative mg/dL    Specific Gravity Urine >1.030 1.003 - 1.035    pH Urine 5.0 5.0 - 7.0 pH    Protein Albumin Urine 100 (A) NEG^Negative mg/dL    Urobilinogen Urine 1.0 0.2 - 1.0 EU/dL    Nitrite Urine Negative NEG^Negative    Blood Urine Small (A) NEG^Negative    Leukocyte Esterase Urine Moderate (A) NEG^Negative    Source Midstream Urine     WBC Urine  (A) OTO5^0 - 5 /HPF    RBC Urine 2-5 (A) OTO2^O - 2 /HPF    Hyaline Casts 2-5 (A) OTO2^O - 2 /LPF    Squamous Epithelial /LPF Urine Many (A) FEW^Few /LPF    Bacteria Urine Many (A) NEG^Negative /HPF    Mucous Urine Present (A) NEG^Negative /LPF   CBC with platelets differential   Result Value Ref Range    WBC 14.1 (H) 4.0 - 11.0 10e9/L    RBC Count 3.60 (L) 3.8 - 5.2 10e12/L    Hemoglobin 11.3 (L) 11.7 - 15.7 g/dL    Hematocrit 33.3 (L) 35.0 - 47.0 %    MCV 93 78 - 100 fl    MCH 31.4 26.5 - 33.0 pg    MCHC 33.9 31.5 - 36.5 g/dL    RDW 13.1 10.0 - 15.0 %    Platelet Count 171 150 - 450 10e9/L    %  Neutrophils 85.2 %    % Lymphocytes 6.1 %    % Monocytes 8.4 %    % Eosinophils 0.1 %    % Basophils 0.2 %    Absolute Neutrophil 12.0 (H) 1.6 - 8.3 10e9/L    Absolute Lymphocytes 0.9 0.8 - 5.3 10e9/L    Absolute Monocytes 1.2 0.0 - 1.3 10e9/L    Absolute Eosinophils 0.0 0.0 - 0.7 10e9/L    Absolute Basophils 0.0 0.0 - 0.2 10e9/L    Diff Method Automated Method    Comprehensive metabolic panel   Result Value Ref Range    Sodium 130 (L) 133 - 144 mmol/L    Potassium 3.9 3.4 - 5.3 mmol/L    Chloride 98 94 - 109 mmol/L    Carbon Dioxide 25 20 - 32 mmol/L    Anion Gap 7 3 - 14 mmol/L    Glucose 113 (H) 70 - 99 mg/dL    Urea Nitrogen 18 7 - 30 mg/dL    Creatinine 0.93 0.52 - 1.04 mg/dL    GFR Estimate 72 >60 mL/min/[1.73_m2]    GFR Estimate If Black 83 >60 mL/min/[1.73_m2]    Calcium 9.7 8.5 - 10.1 mg/dL    Bilirubin Total 0.3 0.2 - 1.3 mg/dL    Albumin 3.1 (L) 3.4 - 5.0 g/dL    Protein Total 7.1 6.8 - 8.8 g/dL    Alkaline Phosphatase 41 40 - 150 U/L    ALT 28 0 - 50 U/L    AST 18 0 - 45 U/L       ASSESSMENT/PLAN      ICD-10-CM    1. Urinary frequency R35.0 UA with Microscopic reflex to Culture     CBC with platelets differential     Comprehensive metabolic panel     Urine Culture Aerobic Bacterial     DISCONTINUED: cefTRIAXone (ROCEPHIN) injection 1 g   2. Fever chills R50.9 UA with Microscopic reflex to Culture     CBC with platelets differential     Comprehensive metabolic panel     DISCONTINUED: cefTRIAXone (ROCEPHIN) injection 1 g   3. Body aches R52 UA with Microscopic reflex to Culture     CBC with platelets differential     Comprehensive metabolic panel   4. Nonspecific finding on examination of urine R82.90 Urine Culture Aerobic Bacterial   5. Pyelonephritis N12 DISCONTINUED: cefTRIAXone (ROCEPHIN) injection 1 g   6. Hyponatremia E87.1 Comprehensive metabolic panel   7. Infection due to ESBL-producing Escherichia coli A49.8     Z16.12      Orders Placed This Encounter     UA with Microscopic reflex to Culture      CBC with platelets differential     Comprehensive metabolic panel     Patient was sent to the ED for IV antibiotics, blood cultures and possible admission  She was in the hospital for sepsis from ESBL E-coli infection and due to the severity of her symptoms today I do not think   We can treat her outpatient without IV antibiotics without knowing if this is ESBL    Urine culture pending

## 2019-06-25 NOTE — PROGRESS NOTES
Arrived to room (623) from ER at (2103) via cart, alert and oriented x 4, oriented to room and call system, IV patent and infusing, saunders patent, rates pain (0)/10, reviewed welcome folder and pain/medication information packet with patient and ( ). Admission profile started/completed. New orders in place, regular diet, voiding adequate amounts. Denies pain. Will keep monitoring.

## 2019-06-25 NOTE — PROGRESS NOTES
[facility]    Medicine Progress Note - Hospitalist Service       Date of Admission:  6/24/2019  Assessment & Plan   Amalia Newman is a 49 year old female admitted on 6/24/2019. She has a past medical history significant for hypertension and previous ESBL E. coli UTIs.  She presented to emergency room with fevers.  Found to have UTI and sepsis.     1.  Sepsis.  Due to UTI.  Continue IV meropenem.  Stop IV fluids.  Await culture results.     2.  UTI.  Recent ESBL E. coli infection.  Continue IV meropenem.  Await results of cultures.     3.  Hypertension.  Continue diltiazem.  Hold lisinopril.     4.  Hyponatremia.  Mild.  Resolved with IV fluids.  Stop IV fluids at this point.    5.  Anemia.  Mild.  No obvious hemorrhage.  Stop monitoring hemoglobin unless she develops acute bleed or symptoms.    Diet: Combination Diet Regular Diet Adult    DVT Prophylaxis: Pneumatic Compression Devices  Leo Catheter: not present  Code Status: Full Code      Disposition Plan   Expected discharge: Tomorrow, recommended to prior living arrangement   Entered: Alfredito Pitt DO 06/25/2019, 12:36 PM           Alfredito Pitt DO  Hospitalist Service  [facility]    ______________________________________________________________________    Interval History   [place] fevers this morning.  No chest pain, shortness of breath, nausea, vomiting, or diarrhea.    Data reviewed today: I reviewed all medications, new labs and imaging results over the last 24 hours.     Physical Exam   Vital Signs: Temp: 101.1  F (38.4  C) Temp src: Oral BP: 130/69 Pulse: 90 Heart Rate: 96 Resp: 18 SpO2: 97 % O2 Device: None (Room air)    Weight: 191 lbs 3.2 oz  Gen:  NAD, A&Ox3.  Eyes:  PERRL, sclera anicteric.  OP:  MMM, no lesions.  Neck:  Supple.  CV:  Regular, no murmurs.  Lung:  CTA b/l, normal effort.  Ab:  +BS, soft.  Skin:  Warm, dry to touch.  No rash.  Ext:  No pitting edema LE b/l.    Data   Recent Labs   Lab  06/25/19  0548 06/24/19  1650   WBC 8.9 14.1*   HGB 10.3* 11.3*   MCV 95 93   * 171    130*   POTASSIUM 3.4 3.9   CHLORIDE 106 98   CO2 25 25   BUN 11 18   CR 0.53 0.93   ANIONGAP 7 7   WEST 8.6 9.7   * 113*   ALBUMIN  --  3.1*   PROTTOTAL  --  7.1   BILITOTAL  --  0.3   ALKPHOS  --  41   ALT  --  28   AST  --  18

## 2019-06-25 NOTE — H&P
[facility]    History and Physical - Hospitalist Service       Date of Admission:  6/24/2019    Assessment & Plan   Amalia Newman is a 49 year old female admitted on 6/24/2019. She has a past medical history significant for hypertension and previous ESBL E. coli UTIs.  She presented to emergency room with fevers.  Found to have UTI and sepsis.    1.  Sepsis.  Due to UTI.  Continue IV meropenem started emergency room.  Repeat 1 L IV fluid bolus with normal saline now.  Continue IV fluids with normal saline 100 cc an hour.    2.  UTI.  Recent ESBL E. coli infection.  Continue IV meropenem started emergency room.  Await results of cultures.    3.  Hypertension.  Hold lisinopril.  Restart diltiazem.    4.  Hyponatremia.  Mild.  Start continuous IV fluids.  Recheck metabolic panel in the morning.     Diet: Combination Diet Regular Diet Adult    DVT Prophylaxis: Pneumatic Compression Devices  Leo Catheter: not present  Code Status: Full Code      Disposition Plan   Expected discharge: 2 - 3 days, recommended to prior living arrangement   Entered: Alfredito Pitt DO 06/24/2019, 10:22 PM         Alfredito Pitt DO  [facility]    ______________________________________________________________________    Chief Complaint   Fevers.    History is obtained from the patient    History of Present Illness   Amalia Newman is a 49 year old female who has a history of hypertension and ESBL E. coli UTI.  She developed fevers 2 days ago.  Has also been feeling weak and fatigued.  Has had urinary frequency.  Symptoms are similar to episode of sepsis she had 1 month ago.  Symptoms are not as severe as they were 1 month ago.  Symptoms have been getting worse over the past 2 days since it began.  Nothing at home seems to be helping symptoms.  IV fluids given the emergency room do seem to have made her feel quite a bit better today.  She does have occasional nausea with this.  No pain.  No other  "complaints.    Review of Systems    The 10 point Review of Systems is negative other than noted in the HPI     Past Medical History    I have reviewed this patient's medical history and updated it with pertinent information if needed.   Past Medical History:   Diagnosis Date     E. coli sepsis (H) 6/1/2019     Essential hypertension, benign 1996     Infection due to ESBL-producing Escherichia coli 6/1/2019     Pure hyperglyceridemia      Tachycardia        Past Surgical History   I have reviewed this patient's surgical history and updated it with pertinent information if needed.  Past Surgical History:   Procedure Laterality Date     DILATION AND CURETTAGE  1996     ENT SURGERY  2013    wisdom teeth      HYSTERECTOMY RADICAL  2006    open hysterectomy ; ovaries are left in        Social History   I have reviewed this patient's social history and updated it with pertinent information if needed.  Social History     Tobacco Use     Smoking status: Never Smoker     Smokeless tobacco: Never Used   Substance Use Topics     Alcohol use: Yes     Comment: \"very rare\"     Drug use: No       Family History   I have reviewed this patient's family history and updated it with pertinent information if needed.   Family History   Problem Relation Age of Onset     Hypertension Mother      Lipids Mother      Cerebrovascular Disease Mother      C.A.D. Father         58     Hypertension Father      Diabetes Father      Vascular Disease Father         PVD     Hypertension Brother      Cancer Paternal Grandmother         Abdominal     Cancer Paternal Grandfather         Liver     Hypertension Maternal Grandmother        Prior to Admission Medications   Prior to Admission Medications   Prescriptions Last Dose Informant Patient Reported? Taking?   EPINEPHrine (EPIPEN/ADRENACLICK/OR ANY BX GENERIC EQUIV) 0.3 MG/0.3ML injection 2-pack prn  No No   Sig: Inject 0.3 mLs (0.3 mg) into the muscle once as needed for anaphylaxis   Ubiquinol (QUNOL " COQ10/UBIQUINOL/ANDRES) 100 MG CAPS 6/24/2019 at Unknown time  Yes Yes   Sig: Take 100 mg by mouth daily    ascorbic acid (VITAMIN C) 500 MG tablet 6/24/2019 at Unknown time  Yes Yes   Sig: Take 2 tablets (1,000 mg) by mouth daily   diltiazem ER COATED BEADS (CARDIZEM CD) 360 MG 24 hr capsule 6/24/2019 at Unknown time  No Yes   Sig: Take 1 capsule (360 mg) by mouth daily   lisinopril (PRINIVIL/ZESTRIL) 10 MG tablet 6/24/2019 at Unknown time  No Yes   Sig: Take 1 tablet (10 mg) by mouth daily      Facility-Administered Medications: None     Allergies   Allergies   Allergen Reactions     Bactrim [Sulfamethoxazole W/Trimethoprim]      No Known Allergies      Vicodin [Hydrocodone-Acetaminophen]      Angry, changes mood.        Physical Exam   Vital Signs: Temp: 98.7  F (37.1  C) Temp src: Oral BP: 111/61 Pulse: 90 Heart Rate: 89 Resp: 18 SpO2: 98 % O2 Device: None (Room air)    Weight: 191 lbs 3.2 oz    Gen:  NAD, A&Ox3.  Eyes:  PERRL, sclera anicteric.  OP:  MMM, no lesions.  Neck:  Supple.  CV:  Regular, no murmurs.  Lung:  CTA b/l, normal effort.  Ab:  +BS, soft.  Skin:  Warm, dry to touch.  No rash.  Ext:  No pitting edema LE b/l.      Data   Data reviewed today: I reviewed all medications, new labs and imaging results over the last 24 hours.     Recent Labs   Lab 06/24/19  1650   WBC 14.1*   HGB 11.3*   MCV 93      *   POTASSIUM 3.9   CHLORIDE 98   CO2 25   BUN 18   CR 0.93   ANIONGAP 7   WEST 9.7   *   ALBUMIN 3.1*   PROTTOTAL 7.1   BILITOTAL 0.3   ALKPHOS 41   ALT 28   AST 18

## 2019-06-25 NOTE — PROGRESS NOTES
Infection Prevention:    Patient requires Contact precautions because of MRSA: right axilla, 2006 and ESBL: unit(s) rine 5/30/19. Please contact Infection Prevention with any questions/concerns at *[phone].    Jessenia Mancia, ICP

## 2019-06-25 NOTE — PHARMACY-ADMISSION MEDICATION HISTORY
Admission medication history interview status for this patient is complete. See [facility] admission navigator for allergy information, prior to admission medications and immunization status.     Medication history interview source(s):Patient  Medication history resources (including written lists, pill bottles, clinic record):None  Primary pharmacy:[facility] [place]    Changes made to PTA medication list:  Added: -  Deleted: -  Changed: -    Actions taken by pharmacist (provider contacted, etc):None     Additional medication history information:None    Medication reconciliation/reorder completed by provider prior to medication history? No    Do you take OTC medications (eg tylenol, ibuprofen, fish oil, eye/ear drops, etc)? yes(Y/N)    For patients on insulin therapy: no (Y/N)  Lantus/levemir/NPH/Mix 70/30 dose:   (Y/N) (see Med list for doses)   Sliding scale Novolog Y/N  If Yes, do you have a baseline novolog pre-meal dose:  units with meals  Patients eat three meals a day:   Y/N    How many episodes of hypoglycemia do you have per week: _______  How many missed doses do you have per week: ______  How many times do you check your blood glucose per day: _______  Do you have a Continuous glucose monitor (CGM)   Y/N (remind pt that not approved for hospital use)   Any Barriers to therapy - Be specific :  cost of medications, comfortable with giving injections (if applicable), comfortable and confident with current diabetes regimen: Y/N ______________      Prior to Admission medications    Medication Sig Last Dose Taking? Auth Provider   ascorbic acid (VITAMIN C) 500 MG tablet Take 2 tablets (1,000 mg) by mouth daily 6/24/2019 at Unknown time Yes Amina Olivares APRN CNP   diltiazem ER COATED BEADS (CARDIZEM CD) 360 MG 24 hr capsule Take 1 capsule (360 mg) by mouth daily 6/24/2019 at Unknown time Yes Amina Olivares APRN CNP   lisinopril (PRINIVIL/ZESTRIL) 10 MG tablet Take 1 tablet (10 mg) by mouth daily  6/24/2019 at Unknown time Yes Amina Olivares APRN CNP   Ubiquinol (QUNOL COQ10/UBIQUINOL/ANDRES) 100 MG CAPS Take 100 mg by mouth daily  6/24/2019 at Unknown time Yes Amina Olivares APRN CNP   EPINEPHrine (EPIPEN/ADRENACLICK/OR ANY BX GENERIC EQUIV) 0.3 MG/0.3ML injection 2-pack Inject 0.3 mLs (0.3 mg) into the muscle once as needed for anaphylaxis prn  Caitlyn Bell PA-C

## 2019-06-25 NOTE — PLAN OF CARE
Alert and oriented. On contact isolation. Tachycardic. Max temp of 102.4 today, MD notified, prn Tylenol and Ibuprofen given, temperature decreased. Tolerating regular diet. Up independently in room, ambulated in hallway. Voiding in adequate amounts. Denies pain. On IV Merrem. Will continue to monitor.

## 2019-06-25 NOTE — ED NOTES
"RECEIVING UNIT ED HANDOFF REVIEW    Above ED Nurse Handoff Report was reviewed: Yes  Reviewed by: Bj Kenny on June 24, 2019 at 9:18 PM   [facility]  ED Nurse Handoff Report    Amalia Newman is a 49 year old female   ED Chief complaint: Fever and Fatigue  . ED Diagnosis:   Final diagnoses:   Urinary tract infection with hematuria, site unspecified     Allergies:   Allergies   Allergen Reactions     Bactrim [Sulfamethoxazole W/Trimethoprim]      No Known Allergies      Vicodin [Hydrocodone-Acetaminophen]      Angry, changes mood.        Code Status: Full Code  Activity level - Baseline/Home:  Independent. Activity Level - Current:   Stand by Assist. Lift room needed: No. Bariatric: No   Needed: No   Isolation: Yes. Infection: Not Applicable  ESBL, MRSA.     Vital Signs:   Vitals:    06/24/19 1823 06/24/19 1900 06/24/19 1930 06/24/19 1945   BP: (!) 178/97 127/76 119/65 119/67   Pulse: 116 111 101 101   Resp: 20  21    Temp: 102.3  F (39.1  C)      TempSrc: Oral      SpO2: 99% 92% 94% 91%   Weight: 85.3 kg (188 lb)      Height: 1.575 m (5' 2\")          Cardiac Rhythm:  ,      Pain level:    Patient confused: No. Patient Falls Risk: Yes.   Elimination Status: Has voided   Patient Report - Initial Complaint: Fever; Fatigue. Focused Assessment:  Patient sent from clinic for fatigue, fever, run down, poor appetite since Saturday. ABC's intact. Denies painful urination, pt states she has dribbling, doesn't feel like she is emptying bladder. Pt reports feeling run down, weak, fevers and poor appetite for the last 3 days. Denies back pain.   Tests Performed:   No orders to display      . Abnormal Results:   Labs Ordered and Resulted from Time of ED Arrival Up to the Time of Departure from the ED   LACTIC ACID WHOLE BLOOD   PULSE OXIMETRY NURSING   CARDIAC CONTINUOUS MONITORING   MEASURE URINE OUTPUT   PATIENT CARE ORDER   BLOOD CULTURE   BLOOD CULTURE     .   Treatments provided: IVF, abx, " tele monitoring, BC, labs, UA   Family Comments: N/A  OBS brochure/video discussed/provided to patient:  No  ED Medications:   Medications   lactated ringers infusion (has no administration in time range)   meropenem (MERREM) 1 g vial to attach to  mL bag (1 g Intravenous New Bag 6/24/19 1947)   lactated ringers BOLUS 2,559 mL (2,559 mLs Intravenous New Bag 6/24/19 1927)   ibuprofen (ADVIL/MOTRIN) tablet 600 mg (600 mg Oral Given 6/24/19 1854)     Drips infusing:  Yes  For the majority of the shift, the patient's behavior Green. Interventions performed were N/A.     Severe Sepsis OR Septic Shock Diagnosis Present: No.       ED Nurse Name/Phone Number: Audra Bosch,   8:03 PM

## 2019-06-25 NOTE — PROGRESS NOTES
"SPIRITUAL HEALTH SERVICES Progress Note  [facility] Ortho 6    Spiritual Health visit per routine admission hospital  request.  Pt is alert, pleasant and denies discomfort.  Amalia shared context of admission and concerns about her readmission to the hospital, \"It's been a really rough June\".  She shared about her deep Oriental orthodox feliciano and how \"The Lord is her hope every day\".   Amalia remains optimistic about her plan of care and prognosis.    Amalia also shared in detail about her mother and how she is in the process of moving her to a memory care facility.  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  Amalia is supported by her spouse, Cosmo and extended family.    Amalia engaged in reflective prayer of gratitude for God and asked for His healing power to help her to fully recover.  She requested a bible which will be provided.    Plan: Spiritual Health Services remains available for additional emotional/spiritual support.    Julio Alexander MA  Staff   Pager: [phone]  Phone: [phone]        "

## 2019-06-26 PROCEDURE — 25000132 ZZH RX MED GY IP 250 OP 250 PS 637: Performed by: INTERNAL MEDICINE

## 2019-06-26 PROCEDURE — 36415 COLL VENOUS BLD VENIPUNCTURE: CPT | Performed by: INTERNAL MEDICINE

## 2019-06-26 PROCEDURE — 12000000 ZZH R&B MED SURG/OB

## 2019-06-26 PROCEDURE — 99207 ZZC CDG-MDM COMPONENT: MEETS LOW - DOWN CODED: CPT | Performed by: INTERNAL MEDICINE

## 2019-06-26 PROCEDURE — 99232 SBSQ HOSP IP/OBS MODERATE 35: CPT | Performed by: INTERNAL MEDICINE

## 2019-06-26 PROCEDURE — 87040 BLOOD CULTURE FOR BACTERIA: CPT | Performed by: INTERNAL MEDICINE

## 2019-06-26 PROCEDURE — 25000128 H RX IP 250 OP 636: Performed by: INTERNAL MEDICINE

## 2019-06-26 RX ORDER — LORAZEPAM 0.5 MG/1
0.5 TABLET ORAL EVERY 6 HOURS PRN
Status: DISCONTINUED | OUTPATIENT
Start: 2019-06-26 | End: 2019-06-28 | Stop reason: HOSPADM

## 2019-06-26 RX ADMIN — MEROPENEM 1 G: 1 INJECTION, POWDER, FOR SOLUTION INTRAVENOUS at 12:24

## 2019-06-26 RX ADMIN — ACETAMINOPHEN 650 MG: 325 TABLET, FILM COATED ORAL at 08:52

## 2019-06-26 RX ADMIN — DILTIAZEM HYDROCHLORIDE 360 MG: 180 CAPSULE, COATED, EXTENDED RELEASE ORAL at 08:47

## 2019-06-26 RX ADMIN — MEROPENEM 1 G: 1 INJECTION, POWDER, FOR SOLUTION INTRAVENOUS at 19:54

## 2019-06-26 RX ADMIN — ACETAMINOPHEN 650 MG: 325 TABLET, FILM COATED ORAL at 22:41

## 2019-06-26 RX ADMIN — MEROPENEM 1 G: 1 INJECTION, POWDER, FOR SOLUTION INTRAVENOUS at 04:01

## 2019-06-26 RX ADMIN — OXYCODONE HYDROCHLORIDE AND ACETAMINOPHEN 1000 MG: 500 TABLET ORAL at 08:47

## 2019-06-26 RX ADMIN — IBUPROFEN 200 MG: 200 TABLET, FILM COATED ORAL at 18:24

## 2019-06-26 RX ADMIN — ACETAMINOPHEN 650 MG: 325 TABLET, FILM COATED ORAL at 16:52

## 2019-06-26 ASSESSMENT — ACTIVITIES OF DAILY LIVING (ADL)
ADLS_ACUITY_SCORE: 11

## 2019-06-26 NOTE — PROGRESS NOTES
[facility]    Medicine Progress Note - Hospitalist Service       Date of Admission:  6/24/2019  Assessment & Plan   Amalia Newman is a 49 year old female admitted on 6/24/2019. She has a past medical history significant for hypertension and previous ESBL E. coli UTIs.  She presented to emergency room with fevers. She was found to have UTI and sepsis.     1.  Sepsis.  Due to UTI.    --Initially given IV fluids and started on IV meropenem.  IV fluid stopped on 6/26.  Await culture results.     2.  UTI.  Recent ESBL E. coli infection. Continue IV meropenem. Day # 2.  Await results of cultures.     3.  Hypertension.  Continue diltiazem.  Hold lisinopril.     4.  Hyponatremia.  Mild.  Resolved with IV fluids.  Stop IV fluids at this point.    5.  Anemia.  Mild.  No obvious hemorrhage. Stop monitoring hemoglobin unless she develops acute bleed or symptoms.    Diet: Combination Diet Regular Diet Adult    DVT Prophylaxis: Pneumatic Compression Devices  Leo Catheter: not present  Code Status: Full Code      Disposition Plan   Expected discharge: Tomorrow, recommended to prior living arrangement   Entered: David Crane MD, MD 06/26/2019, 1:44 PM       David Crane MD, MD  Hospitalist Service  [facility]    ______________________________________________________________________    Interval History   Shelby fevers this morning.  No chest pain, shortness of breath, nausea, vomiting, or diarrhea.    Data reviewed today: I reviewed all medications, new labs and imaging results over the last 24 hours.     Physical Exam   Vital Signs: Temp: 98.6  F (37  C) Temp src: Oral BP: 128/74   Heart Rate: 97 Resp: 16 SpO2: 97 % O2 Device: None (Room air)    Weight: 191 lbs 3.2 oz  Gen:  NAD, A&Ox3.  Eyes:  PERRL, sclera anicteric.  OP:  MMM, no lesions.  Neck:  Supple.  CV:  Regular, no murmurs.  Lung:  CTA b/l, normal effort.  Ab:  +BS, soft.  Skin:  Warm, dry to touch.  No rash.  Ext:  No  pitting edema LE b/l.    Data   Recent Labs   Lab 06/25/19  0548 06/24/19  1650   WBC 8.9 14.1*   HGB 10.3* 11.3*   MCV 95 93   * 171    130*   POTASSIUM 3.4 3.9   CHLORIDE 106 98   CO2 25 25   BUN 11 18   CR 0.53 0.93   ANIONGAP 7 7   WEST 8.6 9.7   * 113*   ALBUMIN  --  3.1*   PROTTOTAL  --  7.1   BILITOTAL  --  0.3   ALKPHOS  --  41   ALT  --  28   AST  --  18

## 2019-06-26 NOTE — PLAN OF CARE
A&O x4. Max temp 101, tylenol given. Tachy at times. WBC improved. IV merrem. Triggered sepsis protocol, Lactic 0.5. IV fluids at 100mL/hr. Tita regular diet well. up with SBA. Denies pain. voiding in good amts, urine cloudy chan, no sx of dysuria noted. Will continue to monitor.

## 2019-06-26 NOTE — PLAN OF CARE
Alert and oriented. Anxious at times. Max temp of 102 today, MD notified, 2nd set of blood cx taken. Prn Tylenol and Ibuprofen given, will continue to monitor temps. Tolerating regular diet. Up independently in room. Voiding in adequate amounts. Denies pain. On IV Merrem.

## 2019-06-27 PROCEDURE — 12000000 ZZH R&B MED SURG/OB

## 2019-06-27 PROCEDURE — 25000132 ZZH RX MED GY IP 250 OP 250 PS 637: Performed by: INTERNAL MEDICINE

## 2019-06-27 PROCEDURE — 25000128 H RX IP 250 OP 636: Performed by: INTERNAL MEDICINE

## 2019-06-27 PROCEDURE — 99232 SBSQ HOSP IP/OBS MODERATE 35: CPT | Performed by: INTERNAL MEDICINE

## 2019-06-27 RX ADMIN — OXYCODONE HYDROCHLORIDE AND ACETAMINOPHEN 1000 MG: 500 TABLET ORAL at 09:50

## 2019-06-27 RX ADMIN — MEROPENEM 1 G: 1 INJECTION, POWDER, FOR SOLUTION INTRAVENOUS at 20:01

## 2019-06-27 RX ADMIN — MEROPENEM 1 G: 1 INJECTION, POWDER, FOR SOLUTION INTRAVENOUS at 04:00

## 2019-06-27 RX ADMIN — MEROPENEM 1 G: 1 INJECTION, POWDER, FOR SOLUTION INTRAVENOUS at 11:50

## 2019-06-27 RX ADMIN — DILTIAZEM HYDROCHLORIDE 360 MG: 180 CAPSULE, COATED, EXTENDED RELEASE ORAL at 09:50

## 2019-06-27 ASSESSMENT — ACTIVITIES OF DAILY LIVING (ADL)
ADLS_ACUITY_SCORE: 11

## 2019-06-27 NOTE — PROGRESS NOTES
"SPIRITUAL HEALTH SERVICES Progress Note  [facility] Ortho 6th Floor     visited pt per consult from interdisciplinary rounds.     Pt requested prayer at this time. She stated \"I'm sorry I don't have time right now.\" Pt was anticipating phone call from uncle regarding her mother's move to a LTC facility.    Pt requested a resource of prayers. SH will deliver prayer booklet later today.    SH remains available.      Natalie Ambriz   Intern  Pager: [phone]  "

## 2019-06-27 NOTE — PROGRESS NOTES
[facility]    Medicine Progress Note - Hospitalist Service       Date of Admission:  6/24/2019  Assessment & Plan   Amalia Newman is a 49 year old female admitted on 6/24/2019. She has a past medical history significant for hypertension and previous ESBL E. coli UTIs. She presented to emergency room with fevers. She was found to have UTI and sepsis.     1.  Sepsis.  Due to UTI.    --Initially given IV fluids and started on IV meropenem.  IV fluid stopped on 6/26. BC and UC negative so far.  Repeat blood culture done on 6/26.  Preliminary results negative.  Will await final culture result.  We will continue IV antibiotic.  Will monitor her vital signs     2.  UTI.  Recent ESBL E. coli infection. Continue IV meropenem. Day # 3.  Await results of cultures.     3.  Hypertension.  Continue diltiazem.  Will resume lisinopril    4.  Hyponatremia.  Mild.  Resolved with IV fluids. IV fluid discontinued    5.  Anemia.  Mild.  No obvious hemorrhage. Stop monitoring hemoglobin unless she develops acute bleed or symptoms.    Diet: Combination Diet Regular Diet Adult    DVT Prophylaxis: Pneumatic Compression Devices  Leo Catheter: not present  Code Status: Full Code      Disposition Plan   Expected discharge: Anticipate discharge tomorrow if remains afebrile.  Entered: David Crane MD, MD 06/27/2019, 1:00 PM       David Crane MD, MD  Hospitalist Service  [facility]  ______________________________________________________________________    Interval History   Patient seen and examined.  She stated that she is feeling much better today.  Fever improving.  No cough or shortness of breath.  No dysuria, urgency or frequency.    Data reviewed today: I reviewed all medications, new labs and imaging results over the last 24 hours.     Physical Exam   Vital Signs: Temp: 98.9  F (37.2  C) Temp src: Oral BP: 114/84   Heart Rate: 99 Resp: 16 SpO2: 95 % O2 Device: None (Room air)     Weight: 191 lbs 3.2 oz  Gen:  NAD, A&Ox3.  Eyes:  PERRL, sclera anicteric.  OP:  MMM, no lesions.  Neck:  Supple.  CV:  Regular, no murmurs.  Lung:  CTA b/l, normal effort.  Ab:  +BS, soft.  Skin:  Warm, dry to touch.  No rash.  Ext:  No pitting edema LE b/l.    Data   Recent Labs   Lab 06/25/19  0548 06/24/19  1650   WBC 8.9 14.1*   HGB 10.3* 11.3*   MCV 95 93   * 171    130*   POTASSIUM 3.4 3.9   CHLORIDE 106 98   CO2 25 25   BUN 11 18   CR 0.53 0.93   ANIONGAP 7 7   WEST 8.6 9.7   * 113*   ALBUMIN  --  3.1*   PROTTOTAL  --  7.1   BILITOTAL  --  0.3   ALKPHOS  --  41   ALT  --  28   AST  --  18

## 2019-06-27 NOTE — PLAN OF CARE
Low grade temps on and off. Reports feeling well. Up ad deyanira, walked in room and hallway. Good appetite. Voiding adequately. On IV Merrem. Contact ISO maintained.

## 2019-06-27 NOTE — PLAN OF CARE
Alert and oriented. Max temp of 99.9 today, no Tylenol given in order to monitor temperatures. Tolerating regular diet. Up independently in room and hallways. Voiding in adequate amounts. Denies pain. On IV Merrem. Cx pending. Plans d/c to home possibly tomorrow if remains afebrile.

## 2019-06-28 VITALS
HEART RATE: 90 BPM | OXYGEN SATURATION: 98 % | SYSTOLIC BLOOD PRESSURE: 156 MMHG | WEIGHT: 191.2 LBS | DIASTOLIC BLOOD PRESSURE: 82 MMHG | BODY MASS INDEX: 33.88 KG/M2 | HEIGHT: 63 IN | RESPIRATION RATE: 16 BRPM | TEMPERATURE: 98.9 F

## 2019-06-28 PROCEDURE — 25000132 ZZH RX MED GY IP 250 OP 250 PS 637: Performed by: INTERNAL MEDICINE

## 2019-06-28 PROCEDURE — 99239 HOSP IP/OBS DSCHRG MGMT >30: CPT | Performed by: INTERNAL MEDICINE

## 2019-06-28 PROCEDURE — 25000128 H RX IP 250 OP 636: Performed by: INTERNAL MEDICINE

## 2019-06-28 RX ORDER — CIPROFLOXACIN 500 MG/1
500 TABLET, FILM COATED ORAL 2 TIMES DAILY
Qty: 10 TABLET | Refills: 0 | Status: SHIPPED | OUTPATIENT
Start: 2019-06-28 | End: 2019-07-03

## 2019-06-28 RX ADMIN — MEROPENEM 1 G: 1 INJECTION, POWDER, FOR SOLUTION INTRAVENOUS at 04:11

## 2019-06-28 RX ADMIN — DILTIAZEM HYDROCHLORIDE 360 MG: 180 CAPSULE, COATED, EXTENDED RELEASE ORAL at 09:21

## 2019-06-28 RX ADMIN — MEROPENEM 1 G: 1 INJECTION, POWDER, FOR SOLUTION INTRAVENOUS at 12:07

## 2019-06-28 RX ADMIN — OXYCODONE HYDROCHLORIDE AND ACETAMINOPHEN 1000 MG: 500 TABLET ORAL at 09:22

## 2019-06-28 ASSESSMENT — ACTIVITIES OF DAILY LIVING (ADL)
ADLS_ACUITY_SCORE: 11

## 2019-06-28 NOTE — PLAN OF CARE
Low grade temps. Max temp 99.2.  No Tylenol or Ibuprofen this shift. LS clear. On IV Merrem. Up ad deyanira. Voiding adequately. Good appetite. Sleeping well.

## 2019-06-28 NOTE — DISCHARGE INSTRUCTIONS
See bladder infection and sepsis handouts for information and signs and sypmtoms of issues that you should see a doctor or return to the ER for.     Call primary doctor if you have persistent loose stool

## 2019-06-28 NOTE — PLAN OF CARE
DAy RN  Vss, up indep. Encouraged hourly deep breathing, walked lots in helms and room  Denied pain stated doesn't feel weak  Eating and drinking well   Voiding well denied diarrhea.  Discharge instructions and medications reviewed. Instructed to  meds at pharmacy.

## 2019-06-28 NOTE — DISCHARGE SUMMARY
[facility]    Discharge Summary  Hospitalist    Date of Admission:  6/24/2019  Date of Discharge:  6/28/2019  1:25 PM  Discharging Provider: David Crane MD  Date of Service (when I saw the patient): 06/28/19    Discharge Diagnoses      1.  Sepsis.  Due to UTI.       2.  UTI.  Recent ESBL E. coli infection     3.  Hypertension.    4.  Hyponatremia.  Mild    5.  Anemia    History of Present Illness   Amalia Newman is an 49 year old female who presented with fever. She was found to have UTI with sepsis.  He was treated with IV meropenem.  Please see hospital course for details  Hospital Course   Amalia Newman is a 49 year old female admitted on 6/24/2019. She has a past medical history significant for hypertension and previous ESBL E. coli UTIs. She presented to emergency room with fevers. She was found to have UTI and sepsis.  History of ESBL E. coli UTI a month ago for which she was treated in this facility with IV meropenem.  She was discharged on oral Bactrim.  She states that she developed reaction to Bactrim couple of days prior to completing her course     1.  Sepsis.  Due to UTI.    --Initially given IV fluids and started on IV meropenem.  IV fluid stopped on 6/26. BC and UC negative so far.  Repeat blood culture done on 6/26.  Preliminary results negative. She was discharged on oral Ciprofloxacin to complete course of treatment.    2.  UTI.  Recent ESBL E. coli infection. Treated with  IV meropenem. Her symptoms improved.     3.  Hypertension.  Continue diltiazem.  Will resume lisinopril     4.  Hyponatremia.  Mild.  Resolved with IV fluids. IV fluid discontinued     5.  Anemia.  Mild.  No obvious hemorrhage. She was advised to follow up as outpatient      Significant Results and Procedures   Results for orders placed or performed during the hospital encounter of 05/30/19   Chest XR,  PA & LAT    Narrative    CHEST TWO VIEWS  5/30/2019 7:30 AM     HISTORY:   Cough.    COMPARISON: None.      Impression    IMPRESSION: Lungs clear. No pleural effusions. Heart size and  pulmonary vascularity are within normal limits. Pectus excavatum.     CATHY GERMAN MD         Pending Results   None  Code Status   Full Code       Primary Care Physician   Amina Olivares    0    Discharge Disposition   Discharged to home  Condition at discharge: Stable    Consultations This Hospital Stay   None    Time Spent on this Encounter   I, David Crane MD, personally saw the patient today and spent greater than 30 minutes discharging this patient.    Discharge Orders      Reason for your hospital stay    UTI     Follow-up and recommended labs and tests     Follow up with primary care provider, Amina Olivares, within 7 days     Activity    Your activity upon discharge: activity as tolerated     Diet    Follow this diet upon discharge: Orders Placed This Encounter      Combination Diet Regular Diet Adult     Discharge Medications   Discharge Medication List as of 6/28/2019 11:42 AM      START taking these medications    Details   ciprofloxacin (CIPRO) 500 MG tablet Take 1 tablet (500 mg) by mouth 2 times daily for 5 days, Disp-10 tablet, R-0, E-Prescribe         CONTINUE these medications which have NOT CHANGED    Details   ascorbic acid (VITAMIN C) 500 MG tablet Take 2 tablets (1,000 mg) by mouth daily, Disp-30 tablet, Historical      diltiazem ER COATED BEADS (CARDIZEM CD) 360 MG 24 hr capsule Take 1 capsule (360 mg) by mouth daily, Disp-30 capsule, R-11, E-Prescribe      EPINEPHrine (EPIPEN/ADRENACLICK/OR ANY BX GENERIC EQUIV) 0.3 MG/0.3ML injection 2-pack Inject 0.3 mLs (0.3 mg) into the muscle once as needed for anaphylaxis, Disp-1 each, R-1, Local Print      lisinopril (PRINIVIL/ZESTRIL) 10 MG tablet Take 1 tablet (10 mg) by mouth daily, Disp-30 tablet, R-11, E-Prescribe      Ubiquinol (QUNOL COQ10/UBIQUINOL/ANDRES) 100 MG CAPS Take 100 mg by mouth daily , Historical              Allergies   Allergies   Allergen Reactions     Bactrim [Sulfamethoxazole W/Trimethoprim]      No Known Allergies      Vicodin [Hydrocodone-Acetaminophen]      Angry, changes mood.      Data   Most Recent 3 CBC's:  Recent Labs   Lab Test 06/25/19  0548 06/24/19  1650 06/03/19  0655   WBC 8.9 14.1* 11.2*   HGB 10.3* 11.3* 13.6   MCV 95 93 92   * 171 253      Most Recent 3 BMP's:  Recent Labs   Lab Test 06/25/19  0548 06/24/19  1650 06/03/19  0655    130* 137   POTASSIUM 3.4 3.9 4.3   CHLORIDE 106 98 103   CO2 25 25 28   BUN 11 18 13   CR 0.53 0.93 0.43*   ANIONGAP 7 7 6   WEST 8.6 9.7 9.9   * 113* 112*     Most Recent 2 LFT's:  Recent Labs   Lab Test 06/24/19  1650 04/25/19  0839   AST 18 12   ALT 28 22   ALKPHOS 41 27*   BILITOTAL 0.3 0.3     Most Recent INR's and Anticoagulation Dosing History:  Anticoagulation Dose History     There is no flowsheet data to display.        Most Recent 3 Troponin's:  Recent Labs   Lab Test 05/30/19  0612   TROPI <0.015     Most Recent Cholesterol Panel:  Recent Labs   Lab Test 04/25/19  0839   CHOL 206*   *   HDL 48*   TRIG 211*     Most Recent 6 Bacteria Isolates From Any Culture (See EPIC Reports for Culture Details):  Recent Labs   Lab Test 06/26/19  1638 06/24/19  2002 06/24/19  1839 06/24/19  1708 06/07/19  1340 05/30/19  0705   CULT No growth after 2 days No growth after 4 days No growth after 4 days >100,000 colonies/mL  mixed urogenital noe   50,000 to 100,000 colonies/mL  mixed urogenital noe  Susceptibility testing not routinely done   >100,000 colonies/mL  mixed urogenital noe  Susceptibility testing not routinely done       Most Recent TSH, T4 and A1c Labs:  Recent Labs   Lab Test 05/31/19  0651 05/30/19  0612   TSH  --  1.54   A1C 5.4  --

## 2019-06-29 NOTE — DISCHARGE SUMMARY
[facility]    Discharge Summary  Hospitalist    Date of Admission:  6/24/2019  Date of Discharge:  6/28/2019  1:25 PM  Discharging Provider: David Crane MD  Date of Service (when I saw the patient): 06/28/19    Discharge Diagnoses      1.  Sepsis.  Due to UTI.      2.  UTI.  Recent ESBL E. coli infection     3.  Hypertension     4.  Hyponatremia     5. Anemia, mild    History of Present Illness   Amalia Newman is an 49 year old female who presented with fever. She was found to have UTI with sepsis.  He was treated with IV meropenem.  Please see hospital course for details.    Hospital Course   Amalia Newman is a 49 year old female admitted on 6/24/2019. She has a past medical history significant for hypertension and previous ESBL E. coli UTIs. She presented to emergency room with fevers. She was found to have UTI and sepsis.  History of ESBL E. coli UTI a month ago for which she was treated in this facility with IV meropenem.  She was discharged on oral Bactrim.  She states that she developed reaction to Bactrim couple of days prior to completing her course.  Patient came back to emergency room for evaluation for fever.  She also stated that she had some frequency of urination.  Urinalysis on admission showed WBC  WBC/hpf, 2-5 RBC/hpf, many bacteria.  She was started on IV meropenem and IV fluid.  She was admitted for further management.    1.  Sepsis.  Due to UTI.    He has recent history of ESBL E. coli UTI.  She was given IV fluid resuscitation and started on IV meropenem.  Patient had significant improvement of her symptoms.  Urine culture and blood culture remained negative.  IV fluid was stopped on 6/26.  Her vital signs improved.  Her fever also resolved.  Patient was very anxious to be discharged with her today.  Her antibiotic was changed to ciprofloxacin 500 mg p.o. twice daily for 5 more days.  She was advised to follow-up with your primary care physician in 1 week.   He was also advised to come back to emergency room if she develops fever or any new symptoms.      3.  Hypertension. Continued diltiazem and  lisinopril     4.  Hyponatremia.  Mild.  Resolved with IV fluids. IV fluid discontinued.     5.  Anemia.  Mild.  No obvious hemorrhage.  Patient remained stable during hospital course. She was advised to follow-up with primary care physician no week time. She was discharged in a stable condition.    Significant Results and Procedures   Results for orders placed or performed during the hospital encounter of 05/30/19   Chest XR,  PA & LAT    Narrative    CHEST TWO VIEWS  5/30/2019 7:30 AM     HISTORY:  Cough.    COMPARISON: None.      Impression    IMPRESSION: Lungs clear. No pleural effusions. Heart size and  pulmonary vascularity are within normal limits. Pectus excavatum.     CATHY GERMAN MD         Pending Results   None  Code Status   Full Code       Primary Care Physician   Amina Olivares    0    Discharge Disposition   Discharged to home  Condition at discharge: Stable    Consultations This Hospital Stay   None    Time Spent on this Encounter   I, David Crane MD, personally saw the patient today and spent greater than 30 minutes discharging this patient.    Discharge Orders      Reason for your hospital stay    UTI     Follow-up and recommended labs and tests     Follow up with primary care provider, Amina Olivares, within 7 days     Activity    Your activity upon discharge: activity as tolerated     Diet    Follow this diet upon discharge: Orders Placed This Encounter      Combination Diet Regular Diet Adult     Discharge Medications   Discharge Medication List as of 6/28/2019 11:42 AM      START taking these medications    Details   ciprofloxacin (CIPRO) 500 MG tablet Take 1 tablet (500 mg) by mouth 2 times daily for 5 days, Disp-10 tablet, R-0, E-Prescribe         CONTINUE these medications which have NOT CHANGED    Details   ascorbic acid  (VITAMIN C) 500 MG tablet Take 2 tablets (1,000 mg) by mouth daily, Disp-30 tablet, Historical      diltiazem ER COATED BEADS (CARDIZEM CD) 360 MG 24 hr capsule Take 1 capsule (360 mg) by mouth daily, Disp-30 capsule, R-11, E-Prescribe      EPINEPHrine (EPIPEN/ADRENACLICK/OR ANY BX GENERIC EQUIV) 0.3 MG/0.3ML injection 2-pack Inject 0.3 mLs (0.3 mg) into the muscle once as needed for anaphylaxis, Disp-1 each, R-1, Local Print      lisinopril (PRINIVIL/ZESTRIL) 10 MG tablet Take 1 tablet (10 mg) by mouth daily, Disp-30 tablet, R-11, E-Prescribe      Ubiquinol (QUNOL COQ10/UBIQUINOL/ANDRES) 100 MG CAPS Take 100 mg by mouth daily , Historical             Allergies   Allergies   Allergen Reactions     Bactrim [Sulfamethoxazole W/Trimethoprim]      No Known Allergies      Vicodin [Hydrocodone-Acetaminophen]      Angry, changes mood.      Data   Most Recent 3 CBC's:  Recent Labs   Lab Test 06/25/19  0548 06/24/19  1650 06/03/19  0655   WBC 8.9 14.1* 11.2*   HGB 10.3* 11.3* 13.6   MCV 95 93 92   * 171 253      Most Recent 3 BMP's:  Recent Labs   Lab Test 06/25/19  0548 06/24/19  1650 06/03/19  0655    130* 137   POTASSIUM 3.4 3.9 4.3   CHLORIDE 106 98 103   CO2 25 25 28   BUN 11 18 13   CR 0.53 0.93 0.43*   ANIONGAP 7 7 6   WEST 8.6 9.7 9.9   * 113* 112*     Most Recent 2 LFT's:  Recent Labs   Lab Test 06/24/19  1650 04/25/19  0839   AST 18 12   ALT 28 22   ALKPHOS 41 27*   BILITOTAL 0.3 0.3     Most Recent INR's and Anticoagulation Dosing History:  Anticoagulation Dose History     There is no flowsheet data to display.        Most Recent 3 Troponin's:  Recent Labs   Lab Test 05/30/19  0612   TROPI <0.015     Most Recent Cholesterol Panel:  Recent Labs   Lab Test 04/25/19  0839   CHOL 206*   *   HDL 48*   TRIG 211*     Most Recent 6 Bacteria Isolates From Any Culture (See EPIC Reports for Culture Details):  Recent Labs   Lab Test 06/26/19  1638 06/24/19  2002 06/24/19  1839 06/24/19  1709  06/07/19  1340 05/30/19  0705   CULT No growth after 2 days No growth after 4 days No growth after 4 days >100,000 colonies/mL  mixed urogenital noe   50,000 to 100,000 colonies/mL  mixed urogenital noe  Susceptibility testing not routinely done   >100,000 colonies/mL  mixed urogenital noe  Susceptibility testing not routinely done       Most Recent TSH, T4 and A1c Labs:  Recent Labs   Lab Test 05/31/19  0651 05/30/19  0612   TSH  --  1.54   A1C 5.4  --

## 2019-06-30 LAB
BACTERIA SPEC CULT: NO GROWTH
BACTERIA SPEC CULT: NO GROWTH
Lab: NORMAL
Lab: NORMAL
SPECIMEN SOURCE: NORMAL
SPECIMEN SOURCE: NORMAL

## 2019-07-02 LAB
BACTERIA SPEC CULT: NO GROWTH
Lab: NORMAL
SPECIMEN SOURCE: NORMAL

## 2019-09-05 ENCOUNTER — HOSPITAL ENCOUNTER (EMERGENCY)
Facility: CLINIC | Age: 49
Discharge: HOME | End: 2019-09-05
Attending: NURSE PRACTITIONER | Admitting: NURSE PRACTITIONER
Payer: COMMERCIAL

## 2019-09-05 VITALS
WEIGHT: 190.26 LBS | DIASTOLIC BLOOD PRESSURE: 74 MMHG | TEMPERATURE: 98.8 F | HEART RATE: 86 BPM | OXYGEN SATURATION: 95 % | SYSTOLIC BLOOD PRESSURE: 121 MMHG | RESPIRATION RATE: 20 BRPM | BODY MASS INDEX: 33.7 KG/M2

## 2019-09-05 DIAGNOSIS — N39.0 URINARY TRACT INFECTION: ICD-10-CM

## 2019-09-05 LAB
ALBUMIN UR-MCNC: 30 MG/DL
ANION GAP SERPL CALCULATED.3IONS-SCNC: 5 MMOL/L (ref 3–14)
APPEARANCE UR: ABNORMAL
B-HCG FREE SERPL-ACNC: <5 IU/L
BACTERIA #/AREA URNS HPF: ABNORMAL /HPF
BASOPHILS # BLD AUTO: 0 10E9/L (ref 0–0.2)
BASOPHILS NFR BLD AUTO: 0.3 %
BILIRUB UR QL STRIP: NEGATIVE
BUN SERPL-MCNC: 15 MG/DL (ref 7–30)
CALCIUM SERPL-MCNC: 9.8 MG/DL (ref 8.5–10.1)
CHLORIDE SERPL-SCNC: 99 MMOL/L (ref 94–109)
CO2 SERPL-SCNC: 25 MMOL/L (ref 20–32)
COLOR UR AUTO: ABNORMAL
CREAT SERPL-MCNC: 0.71 MG/DL (ref 0.52–1.04)
DIFFERENTIAL METHOD BLD: ABNORMAL
EOSINOPHIL # BLD AUTO: 0 10E9/L (ref 0–0.7)
EOSINOPHIL NFR BLD AUTO: 0.1 %
ERYTHROCYTE [DISTWIDTH] IN BLOOD BY AUTOMATED COUNT: 12.7 % (ref 10–15)
GFR SERPL CREATININE-BSD FRML MDRD: >90 ML/MIN/{1.73_M2}
GLUCOSE SERPL-MCNC: 113 MG/DL (ref 70–99)
GLUCOSE UR STRIP-MCNC: NEGATIVE MG/DL
HCT VFR BLD AUTO: 34.6 % (ref 35–47)
HGB BLD-MCNC: 11.5 G/DL (ref 11.7–15.7)
HGB UR QL STRIP: ABNORMAL
IMM GRANULOCYTES # BLD: 0.1 10E9/L (ref 0–0.4)
IMM GRANULOCYTES NFR BLD: 0.4 %
KETONES UR STRIP-MCNC: NEGATIVE MG/DL
LACTATE SERPL-SCNC: 0.7 MMOL/L (ref 0.4–2)
LEUKOCYTE ESTERASE UR QL STRIP: ABNORMAL
LYMPHOCYTES # BLD AUTO: 0.7 10E9/L (ref 0.8–5.3)
LYMPHOCYTES NFR BLD AUTO: 5.1 %
MCH RBC QN AUTO: 30.3 PG (ref 26.5–33)
MCHC RBC AUTO-ENTMCNC: 33.2 G/DL (ref 31.5–36.5)
MCV RBC AUTO: 91 FL (ref 78–100)
MONOCYTES # BLD AUTO: 1.2 10E9/L (ref 0–1.3)
MONOCYTES NFR BLD AUTO: 9.2 %
MUCOUS THREADS #/AREA URNS LPF: PRESENT /LPF
NEUTROPHILS # BLD AUTO: 11 10E9/L (ref 1.6–8.3)
NEUTROPHILS NFR BLD AUTO: 84.9 %
NITRATE UR QL: NEGATIVE
NRBC # BLD AUTO: 0 10*3/UL
NRBC BLD AUTO-RTO: 0 /100
PH UR STRIP: 5.5 PH (ref 5–7)
PLATELET # BLD AUTO: 252 10E9/L (ref 150–450)
POTASSIUM SERPL-SCNC: 4.2 MMOL/L (ref 3.4–5.3)
RBC # BLD AUTO: 3.79 10E12/L (ref 3.8–5.2)
RBC #/AREA URNS AUTO: 5 /HPF (ref 0–2)
SODIUM SERPL-SCNC: 129 MMOL/L (ref 133–144)
SOURCE: ABNORMAL
SP GR UR STRIP: 1.01 (ref 1–1.03)
SQUAMOUS #/AREA URNS AUTO: 11 /HPF (ref 0–1)
UROBILINOGEN UR STRIP-MCNC: NORMAL MG/DL (ref 0–2)
WBC # BLD AUTO: 13 10E9/L (ref 4–11)
WBC #/AREA URNS AUTO: 67 /HPF (ref 0–5)

## 2019-09-05 PROCEDURE — 85025 COMPLETE CBC W/AUTO DIFF WBC: CPT | Performed by: NURSE PRACTITIONER

## 2019-09-05 PROCEDURE — 84702 CHORIONIC GONADOTROPIN TEST: CPT

## 2019-09-05 PROCEDURE — 87086 URINE CULTURE/COLONY COUNT: CPT | Performed by: NURSE PRACTITIONER

## 2019-09-05 PROCEDURE — 25000128 H RX IP 250 OP 636: Performed by: NURSE PRACTITIONER

## 2019-09-05 PROCEDURE — 80048 BASIC METABOLIC PNL TOTAL CA: CPT | Performed by: NURSE PRACTITIONER

## 2019-09-05 PROCEDURE — 96360 HYDRATION IV INFUSION INIT: CPT

## 2019-09-05 PROCEDURE — 81001 URINALYSIS AUTO W/SCOPE: CPT | Performed by: NURSE PRACTITIONER

## 2019-09-05 PROCEDURE — 25000132 ZZH RX MED GY IP 250 OP 250 PS 637: Performed by: NURSE PRACTITIONER

## 2019-09-05 PROCEDURE — 96361 HYDRATE IV INFUSION ADD-ON: CPT

## 2019-09-05 PROCEDURE — 99283 EMERGENCY DEPT VISIT LOW MDM: CPT | Mod: 25

## 2019-09-05 PROCEDURE — 83605 ASSAY OF LACTIC ACID: CPT | Performed by: NURSE PRACTITIONER

## 2019-09-05 RX ORDER — CEPHALEXIN 500 MG/1
500 CAPSULE ORAL 3 TIMES DAILY
Qty: 30 CAPSULE | Refills: 0 | Status: SHIPPED | OUTPATIENT
Start: 2019-09-05 | End: 2019-09-15

## 2019-09-05 RX ORDER — CEPHALEXIN 500 MG/1
500 CAPSULE ORAL ONCE
Status: COMPLETED | OUTPATIENT
Start: 2019-09-05 | End: 2019-09-05

## 2019-09-05 RX ORDER — ACETAMINOPHEN 500 MG
1000 TABLET ORAL ONCE
Status: COMPLETED | OUTPATIENT
Start: 2019-09-05 | End: 2019-09-05

## 2019-09-05 RX ORDER — IBUPROFEN 600 MG/1
600 TABLET, FILM COATED ORAL ONCE
Status: COMPLETED | OUTPATIENT
Start: 2019-09-05 | End: 2019-09-05

## 2019-09-05 RX ADMIN — SODIUM CHLORIDE 1000 ML: 9 INJECTION, SOLUTION INTRAVENOUS at 16:51

## 2019-09-05 RX ADMIN — ACETAMINOPHEN 1000 MG: 500 TABLET, FILM COATED ORAL at 16:50

## 2019-09-05 RX ADMIN — IBUPROFEN 600 MG: 600 TABLET ORAL at 16:50

## 2019-09-05 RX ADMIN — CEPHALEXIN 500 MG: 500 CAPSULE ORAL at 20:12

## 2019-09-05 ASSESSMENT — ENCOUNTER SYMPTOMS
ABDOMINAL PAIN: 0
NAUSEA: 0
SHORTNESS OF BREATH: 0
BACK PAIN: 0
FEVER: 1
APPETITE CHANGE: 1
CHILLS: 1
COUGH: 0
HEADACHES: 1
FREQUENCY: 1
VOMITING: 0
DYSURIA: 1
LIGHT-HEADEDNESS: 1

## 2019-09-05 NOTE — ED TRIAGE NOTES
"A&O x4, ABCs intact. Pt presents with concerns of UTI. Pt states \"I pee a little bit and need to keep going, and I have a headache, and for some reason every time I have a UTI I get a headache.\" Pt appears diaphoretic. Pt denies fowl smell to urine or hematuria.   "

## 2019-09-05 NOTE — ED PROVIDER NOTES
History     Chief Complaint:  UTI and Fever    HPI   Amalia Newman is a 49 year old female who presents with dysuria and a fever. The patient notes that she began having issues emptying her bladder accompanied by dysuria since 9/2 or 9/3. She also notes a slight headache, lightheadedness, fever, chills, and a decreased appetite. She states that she has a slight cough, which she attributes as a normal side effect of taking lisinopril. She took Advil for pain at 0515 this morning. The patient denies vomiting, chest pain, abdominal pain, shortness of breath, back pain or vaginal bleeding.     Allergies:  Bactrim   Vicodin     Medications:    Cardizem  Epinephrine  Lisinopril  Ubiquinol     Past Medical History:    E. Coli sepsis  Hypertension  Hperglyceridemia    Past Surgical History:    Dilation and curettage  [place] teeth surgery  Hysterectomy    Family History:    Mother: hypertension, hyperlipidemia, Cerebrovascular disease  Father: CAD, diabetes, PVD  Brother: hypertension     Social History:  Smoking Status: Never Smoker  Smokeless Tobacco: Never Used  Alcohol Use: Positive, rare  Drug Use: Negative  Marital Status:   [2]       Review of Systems   Constitutional: Positive for appetite change, chills and fever.   Respiratory: Negative for cough and shortness of breath.    Cardiovascular: Negative for chest pain.   Gastrointestinal: Negative for abdominal pain, nausea and vomiting.   Genitourinary: Positive for dysuria and frequency. Negative for vaginal bleeding.   Musculoskeletal: Negative for back pain.   Neurological: Positive for light-headedness and headaches.   All other systems reviewed and are negative.      Physical Exam     Patient Vitals for the past 24 hrs:   BP Temp Temp src Pulse Heart Rate Resp SpO2 Weight   09/05/19 1852 -- 98.8  F (37.1  C) Oral -- -- -- -- --   09/05/19 1819 -- -- -- -- -- -- 95 % --   09/05/19 1818 121/66 -- -- 92 -- -- -- --   09/05/19 1621 (!) 162/91 101.9  F  (38.8  C) Temporal -- 115 20 97 % 86.3 kg (190 lb 4.1 oz)     Physical Exam  General: Alert, No obvious discomfort, well kept, obese  Eyes: PERRL, conjunctivae pink no scleral icterus or conjunctival injection  ENT:   Moist mucus membranes, posterior oropharynx clear without erythema or exudates, No lymphadenopathy, Normal voice  Resp:  Lungs clear to auscultation bilaterally, no crackles/rubs/wheezes. Good air movement  CV:  tachycardia, no murmurs/rubs/gallops  GI:  Abdomen soft and non-distended.  Normoactive BS.  No tenderness, guarding or rebound, No masses  Skin:  Warm, dry.  No rashes or petechiae  Musculoskeletal: No peripheral edema or calf tenderness, Normal gross ROM   Neuro: Alert and oriented to person/place/time, normal sensation  Psychiatric: Normal affect, cooperative, good eye contact    Emergency Department Course     Laboratory:  Recent Results (from the past 8 hour(s))   CBC with platelets differential    Collection Time: 09/05/19  4:40 PM   Result Value Ref Range    WBC 13.0 (H) 4.0 - 11.0 10e9/L    RBC Count 3.79 (L) 3.8 - 5.2 10e12/L    Hemoglobin 11.5 (L) 11.7 - 15.7 g/dL    Hematocrit 34.6 (L) 35.0 - 47.0 %    MCV 91 78 - 100 fl    MCH 30.3 26.5 - 33.0 pg    MCHC 33.2 31.5 - 36.5 g/dL    RDW 12.7 10.0 - 15.0 %    Platelet Count 252 150 - 450 10e9/L    Diff Method Automated Method     % Neutrophils 84.9 %    % Lymphocytes 5.1 %    % Monocytes 9.2 %    % Eosinophils 0.1 %    % Basophils 0.3 %    % Immature Granulocytes 0.4 %    Nucleated RBCs 0 0 /100    Absolute Neutrophil 11.0 (H) 1.6 - 8.3 10e9/L    Absolute Lymphocytes 0.7 (L) 0.8 - 5.3 10e9/L    Absolute Monocytes 1.2 0.0 - 1.3 10e9/L    Absolute Eosinophils 0.0 0.0 - 0.7 10e9/L    Absolute Basophils 0.0 0.0 - 0.2 10e9/L    Abs Immature Granulocytes 0.1 0 - 0.4 10e9/L    Absolute Nucleated RBC 0.0    Lactic acid    Collection Time: 09/05/19  4:40 PM   Result Value Ref Range    Lactic Acid 0.7 0.4 - 2.0 mmol/L   Basic metabolic panel     Collection Time: 09/05/19  4:40 PM   Result Value Ref Range    Sodium 129 (L) 133 - 144 mmol/L    Potassium 4.2 3.4 - 5.3 mmol/L    Chloride 99 94 - 109 mmol/L    Carbon Dioxide 25 20 - 32 mmol/L    Anion Gap 5 3 - 14 mmol/L    Glucose 113 (H) 70 - 99 mg/dL    Urea Nitrogen 15 7 - 30 mg/dL    Creatinine 0.71 0.52 - 1.04 mg/dL    GFR Estimate >90 >60 mL/min/[1.73_m2]    GFR Estimate If Black >90 >60 mL/min/[1.73_m2]    Calcium 9.8 8.5 - 10.1 mg/dL   ISTAT HCG Quantitative Pregnancy POCT    Collection Time: 09/05/19  4:46 PM   Result Value Ref Range    HCG Quantitative Serum <5.0 <5.0 IU/L   UA with Microscopic reflex to Culture    Collection Time: 09/05/19  6:06 PM   Result Value Ref Range    Color Urine Light Yellow     Appearance Urine Slightly Cloudy     Glucose Urine Negative NEG^Negative mg/dL    Bilirubin Urine Negative NEG^Negative    Ketones Urine Negative NEG^Negative mg/dL    Specific Gravity Urine 1.014 1.003 - 1.035    Blood Urine Trace (A) NEG^Negative    pH Urine 5.5 5.0 - 7.0 pH    Protein Albumin Urine 30 (A) NEG^Negative mg/dL    Urobilinogen mg/dL Normal 0.0 - 2.0 mg/dL    Nitrite Urine Negative NEG^Negative    Leukocyte Esterase Urine Large (A) NEG^Negative    Source Midstream Urine     WBC Urine 67 (H) 0 - 5 /HPF    RBC Urine 5 (H) 0 - 2 /HPF    Bacteria Urine Few (A) NEG^Negative /HPF    Squamous Epithelial /HPF Urine 11 (H) 0 - 1 /HPF    Mucous Urine Present (A) NEG^Negative /LPF         Interventions:  Medications   cephALEXin (KEFLEX) capsule 500 mg (has no administration in time range)   0.9% sodium chloride BOLUS (1,000 mLs Intravenous New Bag 9/5/19 1651)   acetaminophen (TYLENOL) tablet 1,000 mg (1,000 mg Oral Given 9/5/19 1650)   ibuprofen (ADVIL/MOTRIN) tablet 600 mg (600 mg Oral Given 9/5/19 1650)       Emergency Department Course:   Past medical records, nursing notes, and vitals reviewed.  1635: I performed an exam of the patient and obtained history, as documented above.    IV was  inserted and blood was drawn for laboratory testing, results above.    The patient provided a urine sample here in the emergency department. This was sent for laboratory testing, findings above.    Findings and plan explained to the Patient. Patient discharged home with instructions regarding supportive care, medications, and reasons to return. The importance of close follow-up was reviewed. The patient was prescribed Keflex    I personally reviewed the laboratory results with the Patient and answered all related questions prior to discharge.      Impression & Plan      Medical Decision Making:  Amalia Newman is a 49 year old female who presents today for evaluation of fever and concerns for urinary tract infection.  She states she has gotten urinary tract infections in the past which caused her to have a headache and this is what she is experiencing today.  Her examination shows no significant physical findings.  There is no CVA tenderness.  No suprapubic tenderness.  Urinalysis confirms urinary tract infection with mildly elevated white cell count.  She has a normal lactic acid.  There is no signs of sepsis.  She does have some mild dehydration with a slightly low sodium.  This does appear to be a complicated urinary tract infection due to the fact that she has developed a fever as well.  She is therefore placed on Keflex 3 times daily.  She is tolerating p.o. intake without difficulty.  She is receiving IV fluids here her fever has resolved and her tachycardia has resolved.  She appears to be safe and appropriate for outpatient management and follow-up.  She will be discharged home after first dose of antibiotics and fluid administration has finished.  Strict return protocols were discussed.      Diagnosis:    ICD-10-CM    1. Urinary tract infection N39.0 Urine Culture Aerobic Bacterial     Urine Culture Aerobic Bacterial       Disposition:  discharged to home    Discharge Medications:  New Prescriptions     CEPHALEXIN (KEFLEX) 500 MG CAPSULE    Take 1 capsule (500 mg) by mouth 3 times daily for 10 days     I, Sulma Xiong, am serving as a scribe on 9/5/2019 at 5:38 PM to personally document services performed by Johnny Tong APRN* based on my observations and the provider's statements to me.       Sulma Xiong  9/5/2019   [facility] EMERGENCY DEPARTMENT       Johnny Tong APRN CNP  09/05/19 [id number]

## 2019-09-05 NOTE — ED AVS SNAPSHOT
[facility]  [address]  [facility] [address]  Phone:  [phone]  Fax:  [phone]                                    Amalia Newman   MRN: [id number]    Department:  [facility]   Date of Visit:  9/5/2019           After Visit Summary Signature Page    I have received my discharge instructions, and my questions have been answered. I have discussed any challenges I see with this plan with the nurse or doctor.    ..........................................................................................................................................  Patient/Patient Representative Signature      ..........................................................................................................................................  Patient Representative Print Name and Relationship to Patient    ..................................................               ................................................  Date                                   Time    ..........................................................................................................................................  Reviewed by Signature/Title    ...................................................              ..............................................  Date                                               Time          22EPIC Rev 08/18

## 2019-09-06 LAB
BACTERIA SPEC CULT: NORMAL
Lab: NORMAL
SPECIMEN SOURCE: NORMAL

## 2019-09-06 NOTE — RESULT ENCOUNTER NOTE
Emergency Dept/Urgent Care discharge antibiotic (if prescribed): Cephalexin (Keflex) 500 mg capsule, 1 capsule (500 mg) by mouth 3 times daily for 10 days.  Date of Rx (if applicable):  9/5/19  No changes in treatment per Urine culture protocol.

## 2019-12-04 ENCOUNTER — OFFICE VISIT (OUTPATIENT)
Dept: URGENT CARE | Facility: URGENT CARE | Age: 49
End: 2019-12-04
Payer: COMMERCIAL

## 2019-12-04 VITALS
HEART RATE: 83 BPM | BODY MASS INDEX: 35.44 KG/M2 | SYSTOLIC BLOOD PRESSURE: 124 MMHG | WEIGHT: 200 LBS | OXYGEN SATURATION: 98 % | TEMPERATURE: 98.3 F | RESPIRATION RATE: 16 BRPM | HEIGHT: 63 IN | DIASTOLIC BLOOD PRESSURE: 72 MMHG

## 2019-12-04 DIAGNOSIS — L29.9 ITCHING: ICD-10-CM

## 2019-12-04 DIAGNOSIS — L24.9 IRRITANT CONTACT DERMATITIS, UNSPECIFIED TRIGGER: Primary | ICD-10-CM

## 2019-12-04 PROCEDURE — 99213 OFFICE O/P EST LOW 20 MIN: CPT | Performed by: PHYSICIAN ASSISTANT

## 2019-12-04 RX ORDER — CETIRIZINE HYDROCHLORIDE 10 MG/1
10 TABLET ORAL DAILY
Qty: 30 TABLET | Refills: 0 | Status: SHIPPED | OUTPATIENT
Start: 2019-12-04 | End: 2020-01-12

## 2019-12-04 RX ORDER — TRIAMCINOLONE ACETONIDE 5 MG/G
CREAM TOPICAL 2 TIMES DAILY
Qty: 45 G | Refills: 1 | Status: SHIPPED | OUTPATIENT
Start: 2019-12-04 | End: 2020-04-09

## 2019-12-04 ASSESSMENT — MIFFLIN-ST. JEOR: SCORE: 1488.38

## 2019-12-04 ASSESSMENT — PAIN SCALES - GENERAL: PAINLEVEL: NO PAIN (0)

## 2019-12-10 NOTE — PROGRESS NOTES
"SUBJECTIVE:   Amalia Newman is a 49 year old female presenting with a chief complaint of having itching skin and rash.  Onset of symptoms was 2 week(s) ago.  Course of illness is same.    Severity mild to moderate  Current and Associated symptoms: itching of both arms  Treatment measures tried include otc medications.  Predisposing factors include none.    Past Medical History:   Diagnosis Date     E. coli sepsis (H) 6/1/2019     Essential hypertension, benign 1996     Infection due to ESBL-producing Escherichia coli 6/1/2019     Pure hyperglyceridemia      Tachycardia         Allergies   Allergen Reactions     Bactrim [Sulfamethoxazole W/Trimethoprim]      No Known Allergies      Vicodin [Hydrocodone-Acetaminophen]      Angry, changes mood.      Family History   Problem Relation Age of Onset     Hypertension Mother      Lipids Mother      Cerebrovascular Disease Mother      C.A.D. Father         58     Hypertension Father      Diabetes Father      Vascular Disease Father         PVD     Hypertension Brother      Cancer Paternal Grandmother         Abdominal     Cancer Paternal Grandfather         Liver     Hypertension Maternal Grandmother          Social History     Tobacco Use     Smoking status: Never Smoker     Smokeless tobacco: Never Used   Substance Use Topics     Alcohol use: Yes     Comment: \"very rare\"       ROS:  CONSTITUTIONAL:NEGATIVE for fever, chills, change in weight  INTEGUMENTARY/SKIN: POSITIVE for rash on both arms, itching  ENT/MOUTH: NEGATIVE for ear, mouth and throat problems  RESP:NEGATIVE for significant cough or SOB  CV: NEGATIVE for chest pain, palpitations or peripheral edema  GI: NEGATIVE for nausea, abdominal pain, heartburn, or change in bowel habits  : negative for and dysuria  MUSCULOSKELETAL: NEGATIVE for significant arthralgias or myalgia  NEURO: NEGATIVE for weakness, dizziness or paresthesias    OBJECTIVE  :/72 (BP Location: Left arm, Patient Position: Sitting, Cuff " "Size: Adult Large)   Pulse 83   Temp 98.3  F (36.8  C) (Oral)   Resp 16   Ht 1.588 m (5' 2.5\")   Wt 90.7 kg (200 lb)   LMP 12/06/2005 (LMP Unknown)   SpO2 98%   Breastfeeding No   BMI 36.00 kg/m    GENERAL APPEARANCE: healthy, alert and no distress  EYES: EOMI,  PERRL, conjunctiva clear  HENT: ear canals and TM's normal.  Nose and mouth without ulcers, erythema or lesions  NECK: supple, nontender, no lymphadenopathy  RESP: lungs clear to auscultation - no rales, rhonchi or wheezes  CV: regular rates and rhythm, normal S1 S2, no murmur noted  ABDOMEN:  soft, nontender, no HSM or masses and bowel sounds normal  NEURO: Normal strength and tone, sensory exam grossly normal,  normal speech and mentation  SKIN: Positive for bilateral itching and blanching rash on arms    ASSESSMENT/PLAN:    ICD-10-CM    1. Irritant contact dermatitis, unspecified trigger L24.9 triamcinolone (ARISTOCORT HP) 0.5 % external cream   2. Itching L29.9 cetirizine (ZYRTEC) 10 MG tablet       Orders Placed This Encounter     triamcinolone (ARISTOCORT HP) 0.5 % external cream     cetirizine (ZYRTEC) 10 MG tablet       Follow up with PCP as needed  See orders in Epic    "

## 2020-01-01 DIAGNOSIS — L29.9 ITCHING: ICD-10-CM

## 2020-01-12 RX ORDER — CETIRIZINE HYDROCHLORIDE 10 MG/1
TABLET ORAL
Qty: 30 TABLET | Refills: 0 | Status: SHIPPED | OUTPATIENT
Start: 2020-01-12 | End: 2020-04-09

## 2020-03-23 ENCOUNTER — OFFICE VISIT (OUTPATIENT)
Dept: URGENT CARE | Facility: URGENT CARE | Age: 50
End: 2020-03-23
Payer: COMMERCIAL

## 2020-03-23 VITALS
SYSTOLIC BLOOD PRESSURE: 128 MMHG | TEMPERATURE: 98 F | DIASTOLIC BLOOD PRESSURE: 74 MMHG | HEART RATE: 107 BPM | OXYGEN SATURATION: 99 %

## 2020-03-23 DIAGNOSIS — N30.01 ACUTE CYSTITIS WITH HEMATURIA: Primary | ICD-10-CM

## 2020-03-23 DIAGNOSIS — R82.90 NONSPECIFIC FINDING ON EXAMINATION OF URINE: ICD-10-CM

## 2020-03-23 LAB
ALBUMIN UR-MCNC: 30 MG/DL
APPEARANCE UR: ABNORMAL
BACTERIA #/AREA URNS HPF: ABNORMAL /HPF
BILIRUB UR QL STRIP: NEGATIVE
COLOR UR AUTO: YELLOW
GLUCOSE UR STRIP-MCNC: NEGATIVE MG/DL
HGB UR QL STRIP: ABNORMAL
KETONES UR STRIP-MCNC: NEGATIVE MG/DL
LEUKOCYTE ESTERASE UR QL STRIP: ABNORMAL
NITRATE UR QL: POSITIVE
NON-SQ EPI CELLS #/AREA URNS LPF: ABNORMAL /LPF
PH UR STRIP: 5.5 PH (ref 5–7)
RBC #/AREA URNS AUTO: ABNORMAL /HPF
SOURCE: ABNORMAL
SP GR UR STRIP: >1.03 (ref 1–1.03)
UROBILINOGEN UR STRIP-ACNC: 0.2 EU/DL (ref 0.2–1)
WBC #/AREA URNS AUTO: ABNORMAL /HPF

## 2020-03-23 PROCEDURE — 87086 URINE CULTURE/COLONY COUNT: CPT | Performed by: PHYSICIAN ASSISTANT

## 2020-03-23 PROCEDURE — 81001 URINALYSIS AUTO W/SCOPE: CPT | Performed by: FAMILY MEDICINE

## 2020-03-23 PROCEDURE — 87186 SC STD MICRODIL/AGAR DIL: CPT | Performed by: PHYSICIAN ASSISTANT

## 2020-03-23 PROCEDURE — 99213 OFFICE O/P EST LOW 20 MIN: CPT | Performed by: PHYSICIAN ASSISTANT

## 2020-03-23 PROCEDURE — 87088 URINE BACTERIA CULTURE: CPT | Performed by: PHYSICIAN ASSISTANT

## 2020-03-23 RX ORDER — CEPHALEXIN 500 MG/1
500 CAPSULE ORAL 2 TIMES DAILY
Qty: 20 CAPSULE | Refills: 0 | Status: SHIPPED | OUTPATIENT
Start: 2020-03-23 | End: 2020-04-09

## 2020-03-23 NOTE — PATIENT INSTRUCTIONS
Patient was educated on the natural course of condition  Take medication as directed. Side effects discussed. Conservative measures discussed including drinking fluids (water), wiping from front to back, avoiding holding urine when there is urge to urinate, and over-the-counter AZO. See your primary care provider if symptoms do not improve in 3 days. Seek emergency care if you develop severe abdominal/flank pain, or fever.

## 2020-03-23 NOTE — PROGRESS NOTES
"URGENT CARE VISIT:    SUBJECTIVE:    Amalia Newman is a 49 year old female who  presents today for a possible UTI. Symptoms of urgency, frequency and burning have been going on for 3 day(s). Symptoms were gradual onset and mild.  Patient denies back pain, nausea, vomiting, fever and chills or vaginal discharge. This patient does have a history of urinary tract infections.     PMH:   Past Medical History:   Diagnosis Date     E. coli sepsis (H) 6/1/2019     Essential hypertension, benign 1996     Infection due to ESBL-producing Escherichia coli 6/1/2019     Pure hyperglyceridemia      Tachycardia      Allergies: Bactrim [sulfamethoxazole w/trimethoprim]; No known allergies; and Vicodin [hydrocodone-acetaminophen]   Medications:   Current Outpatient Medications   Medication Sig Dispense Refill     ascorbic acid (VITAMIN C) 500 MG tablet Take 2 tablets (1,000 mg) by mouth daily 30 tablet      cephALEXin (KEFLEX) 500 MG capsule Take 1 capsule (500 mg) by mouth 2 times daily for 10 days 20 capsule 0     diltiazem ER COATED BEADS (CARDIZEM CD) 360 MG 24 hr capsule Take 1 capsule (360 mg) by mouth daily 30 capsule 11     lisinopril (PRINIVIL/ZESTRIL) 10 MG tablet Take 1 tablet (10 mg) by mouth daily 30 tablet 11     triamcinolone (ARISTOCORT HP) 0.5 % external cream Apply topically 2 times daily 45 g 1     Ubiquinol (QUNOL COQ10/UBIQUINOL/ANDRES) 100 MG CAPS Take 100 mg by mouth daily        cetirizine (ZYRTEC) 10 MG tablet TAKE 1 TABLET BY MOUTH EVERY DAY (Patient not taking: Reported on 3/23/2020) 30 tablet 0     EPINEPHrine (EPIPEN/ADRENACLICK/OR ANY BX GENERIC EQUIV) 0.3 MG/0.3ML injection 2-pack Inject 0.3 mLs (0.3 mg) into the muscle once as needed for anaphylaxis (Patient not taking: Reported on 12/4/2019) 1 each 1     Social History:   Social History     Tobacco Use     Smoking status: Never Smoker     Smokeless tobacco: Never Used   Substance Use Topics     Alcohol use: Yes     Comment: \"very rare\" "       ROS:  Review of systems negative except as stated above.    OBJECTIVE:  /74   Pulse 107   Temp 98  F (36.7  C) (Tympanic)   LMP 12/06/2005 (LMP Unknown)   SpO2 99%   GENERAL APPEARANCE: healthy, alert and no distress  RESP: lungs clear to auscultation - no rales, rhonchi or wheezes  CV: regular rates and rhythm, normal S1 S2, no murmur noted  ABDOMEN:  soft, nontender, no HSM or masses and bowel sounds normal  BACK: No CVA tenderness  SKIN: no suspicious lesions or rashes    Labs:    Results for orders placed or performed in visit on 03/23/20   *UA reflex to Microscopic and Culture ([place] [facility] (except [place] and [place])     Status: Abnormal    Specimen: Midstream Urine   Result Value Ref Range    Color Urine Yellow     Appearance Urine Cloudy     Glucose Urine Negative NEG^Negative mg/dL    Bilirubin Urine Negative NEG^Negative    Ketones Urine Negative NEG^Negative mg/dL    Specific Gravity Urine >1.030 1.003 - 1.035    Blood Urine Small (A) NEG^Negative    pH Urine 5.5 5.0 - 7.0 pH    Protein Albumin Urine 30 (A) NEG^Negative mg/dL    Urobilinogen Urine 0.2 0.2 - 1.0 EU/dL    Nitrite Urine Positive (A) NEG^Negative    Leukocyte Esterase Urine Small (A) NEG^Negative    Source Midstream Urine    Urine Microscopic     Status: Abnormal   Result Value Ref Range    WBC Urine  (A) OTO5^0 - 5 /HPF    RBC Urine 10-25 (A) OTO2^O - 2 /HPF    Squamous Epithelial /LPF Urine Many (A) FEW^Few /LPF    Bacteria Urine Many (A) NEG^Negative /HPF       ASSESSMENT:     ICD-10-CM    1. Acute cystitis with hematuria  N30.01 *UA reflex to Microscopic and Culture ([place] [facility] (except [place] and [place])     Urine Microscopic     cephALEXin (KEFLEX) 500 MG capsule   2. Nonspecific finding on examination of urine  R82.90 Urine Culture Aerobic Bacterial       PLAN:  Patient Instructions   Patient was educated on the natural course of condition  Take medication as directed. Side  effects discussed. Conservative measures discussed including drinking fluids (water), wiping from front to back, avoiding holding urine when there is urge to urinate, and over-the-counter AZO. See your primary care provider if symptoms do not improve in 3 days. Seek emergency care if you develop severe abdominal/flank pain, or fever.Patient verbalized understanding and is agreeable to plan. The patient was discharged ambulatory and in stable condition.    Whitney Garnica PA-C on 3/23/2020 at 10:03 AM

## 2020-03-25 LAB
BACTERIA SPEC CULT: ABNORMAL
SPECIMEN SOURCE: ABNORMAL

## 2020-04-09 ENCOUNTER — VIRTUAL VISIT (OUTPATIENT)
Dept: INTERNAL MEDICINE | Facility: CLINIC | Age: 50
End: 2020-04-09
Payer: COMMERCIAL

## 2020-04-09 DIAGNOSIS — I10 BENIGN ESSENTIAL HYPERTENSION: ICD-10-CM

## 2020-04-09 DIAGNOSIS — R73.09 ELEVATED GLUCOSE: ICD-10-CM

## 2020-04-09 DIAGNOSIS — E78.1 PURE HYPERGLYCERIDEMIA: Primary | ICD-10-CM

## 2020-04-09 PROCEDURE — 99213 OFFICE O/P EST LOW 20 MIN: CPT | Mod: TEL | Performed by: NURSE PRACTITIONER

## 2020-04-09 RX ORDER — DILTIAZEM HYDROCHLORIDE 360 MG/1
360 CAPSULE, EXTENDED RELEASE ORAL DAILY
Qty: 30 CAPSULE | Refills: 11 | Status: SHIPPED | OUTPATIENT
Start: 2020-04-09 | End: 2021-03-12

## 2020-04-09 RX ORDER — LISINOPRIL 10 MG/1
10 TABLET ORAL DAILY
Qty: 30 TABLET | Refills: 11 | Status: SHIPPED | OUTPATIENT
Start: 2020-04-09 | End: 2021-05-25

## 2020-04-09 NOTE — NURSING NOTE
"Chief Complaint   Patient presents with     Recheck Medication     initial LMP 12/06/2005 (LMP Unknown)  Estimated body mass index is 36 kg/m  as calculated from the following:    Height as of 12/4/19: 1.588 m (5' 2.5\").    Weight as of 12/4/19: 90.7 kg (200 lb)..  bp completed using cuff size NA (Not Taken)  CARLOS MACKENZIE LPN  "

## 2020-04-09 NOTE — PROGRESS NOTES
".  Subjective     Amalia Newman is a 50 year old female who is being evaluated via a billable telephone visit.      The patient has been notified of following:     \"This telephone visit will be conducted via a call between you and your physician/provider. We have found that certain health care needs can be provided without the need for a physical exam.  This service lets us provide the care you need with a short phone conversation.  If a prescription is necessary we can send it directly to your pharmacy.  If lab work is needed we can place an order for that and you can then stop by our lab to have the test done at a later time.    Telephone visits are billed at different rates depending on your insurance coverage. During this emergency period, for some insurers they may be billed the same as an in-person visit.  Please reach out to your insurance provider with any questions.    If during the course of the call the physician/provider feels a telephone visit is not appropriate, you will not be charged for this service.\"    Patient has given verbal consent for Telephone visit?  Yes    How would you like to obtain your AVS? 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 coli sepsis (H)     Sepsis due to gram-negative UTI (H)     Past Surgical History:   Procedure Laterality Date     " "DILATION AND CURETTAGE  1996     ENT SURGERY  2013    wisdom teeth      HYSTERECTOMY RADICAL  2006    open hysterectomy ; ovaries are left in        Social History     Tobacco Use     Smoking status: Never Smoker     Smokeless tobacco: Never Used   Substance Use Topics     Alcohol use: Yes     Comment: \"very rare\"     Family History   Problem Relation Age of Onset     Hypertension Mother      Lipids Mother      Cerebrovascular Disease Mother      C.A.D. Father         58     Hypertension Father      Diabetes Father      Vascular Disease Father         PVD     Hypertension Brother      Cancer Paternal Grandmother         Abdominal     Cancer Paternal Grandfather         Liver     Hypertension Maternal Grandmother            Reviewed and updated as needed this visit by Provider  Tobacco  Allergies  Meds  Problems  Med Hx  Surg Hx  Fam Hx         Review of Systems   ROS COMP: Constitutional, HEENT, cardiovascular, pulmonary, GI, , musculoskeletal, neuro, skin, endocrine and psych systems are negative, except as otherwise noted.       Objective   Reported vitals:  LMP 12/06/2005 (LMP Unknown)    alert and no distress  Psych: Alert and oriented times 3; coherent speech, normal   rate and volume, able to articulate logical thoughts, able   to abstract reason, no tangential thoughts, no hallucinations   or delusions  Her affect is pleasant      Diagnostic Test Results:  Labs reviewed in [facility]  Future lab ordered         Assessment/Plan:  1. Benign essential hypertension  In good control current medication   - lisinopril (ZESTRIL) 10 MG tablet; Take 1 tablet (10 mg) by mouth daily  Dispense: 30 tablet; Refill: 11  - diltiazem ER COATED BEADS (CARDIZEM CD) 360 MG 24 hr capsule; Take 1 capsule (360 mg) by mouth daily  Dispense: 30 capsule; Refill: 11  - Lipid panel reflex to direct LDL Fasting; Future  - Comprehensive metabolic panel (BMP + Alb, Alk Phos, ALT, AST, Total. Bili, TP); Future  - TSH with free T4 reflex; " Future  - Albumin Random Urine Quantitative with Creat Ratio; Future  - CBC with platelets and differential; Future    2. Pure hyperglyceridemia    - Lipid panel reflex to direct LDL Fasting; Future  - Comprehensive metabolic panel (BMP + Alb, Alk Phos, ALT, AST, Total. Bili, TP); Future    3. Elevated glucose    - Comprehensive metabolic panel (BMP + Alb, Alk Phos, ALT, AST, Total. Bili, TP); Future  - Hemoglobin A1c; Future    Return in about 3 months (around 7/9/2020) for Lab Work.      Phone call duration:  12 minutes    WADE Dillon CNP

## 2020-05-27 ENCOUNTER — HOSPITAL ENCOUNTER (OUTPATIENT)
Dept: MAMMOGRAPHY | Facility: CLINIC | Age: 50
Discharge: HOME | End: 2020-05-27
Attending: NURSE PRACTITIONER | Admitting: NURSE PRACTITIONER
Payer: COMMERCIAL

## 2020-05-27 DIAGNOSIS — Z12.31 VISIT FOR SCREENING MAMMOGRAM: ICD-10-CM

## 2020-05-27 PROCEDURE — 77063 BREAST TOMOSYNTHESIS BI: CPT

## 2020-06-01 ENCOUNTER — APPOINTMENT (OUTPATIENT)
Dept: GENERAL RADIOLOGY | Facility: CLINIC | Age: 50
End: 2020-06-01
Attending: EMERGENCY MEDICINE
Payer: COMMERCIAL

## 2020-06-01 ENCOUNTER — APPOINTMENT (OUTPATIENT)
Dept: ULTRASOUND IMAGING | Facility: CLINIC | Age: 50
End: 2020-06-01
Attending: EMERGENCY MEDICINE
Payer: COMMERCIAL

## 2020-06-01 ENCOUNTER — HOSPITAL ENCOUNTER (EMERGENCY)
Facility: CLINIC | Age: 50
Discharge: HOME | End: 2020-06-01
Attending: EMERGENCY MEDICINE | Admitting: EMERGENCY MEDICINE
Payer: COMMERCIAL

## 2020-06-01 ENCOUNTER — HOSPITAL ENCOUNTER (OUTPATIENT)
Dept: ULTRASOUND IMAGING | Facility: CLINIC | Age: 50
Discharge: HOME | End: 2020-06-01
Attending: NURSE PRACTITIONER | Admitting: NURSE PRACTITIONER
Payer: COMMERCIAL

## 2020-06-01 VITALS
OXYGEN SATURATION: 99 % | HEART RATE: 94 BPM | DIASTOLIC BLOOD PRESSURE: 81 MMHG | RESPIRATION RATE: 18 BRPM | SYSTOLIC BLOOD PRESSURE: 148 MMHG

## 2020-06-01 DIAGNOSIS — R92.8 ABNORMAL MAMMOGRAM: ICD-10-CM

## 2020-06-01 DIAGNOSIS — M25.562 ACUTE PAIN OF LEFT KNEE: ICD-10-CM

## 2020-06-01 PROCEDURE — 73562 X-RAY EXAM OF KNEE 3: CPT | Mod: LT

## 2020-06-01 PROCEDURE — 99285 EMERGENCY DEPT VISIT HI MDM: CPT | Mod: 25

## 2020-06-01 PROCEDURE — 93971 EXTREMITY STUDY: CPT | Mod: LT

## 2020-06-01 PROCEDURE — 76642 ULTRASOUND BREAST LIMITED: CPT | Mod: LT

## 2020-06-01 NOTE — ED AVS SNAPSHOT
[facility] Emergency Department  [address]  [facility] [address]  Phone:  [phone]  Fax:  [phone]                                    Amalia Newman   MRN: [id number]    Department:  [facility] Emergency Department   Date of Visit:  6/1/2020           After Visit Summary Signature Page    I have received my discharge instructions, and my questions have been answered. I have discussed any challenges I see with this plan with the nurse or doctor.    ..........................................................................................................................................  Patient/Patient Representative Signature      ..........................................................................................................................................  Patient Representative Print Name and Relationship to Patient    ..................................................               ................................................  Date                                   Time    ..........................................................................................................................................  Reviewed by Signature/Title    ...................................................              ..............................................  Date                                               Time          22EPIC Rev 08/18

## 2020-06-02 ENCOUNTER — VIRTUAL VISIT (OUTPATIENT)
Dept: INTERNAL MEDICINE | Facility: CLINIC | Age: 50
End: 2020-06-02
Payer: COMMERCIAL

## 2020-06-02 DIAGNOSIS — M25.562 ACUTE PAIN OF LEFT KNEE: Primary | ICD-10-CM

## 2020-06-02 PROCEDURE — 99213 OFFICE O/P EST LOW 20 MIN: CPT | Mod: 95 | Performed by: NURSE PRACTITIONER

## 2020-06-02 RX ORDER — IBUPROFEN 200 MG
200 TABLET ORAL EVERY 4 HOURS PRN
COMMUNITY

## 2020-06-02 NOTE — PROGRESS NOTES
"Subjective     Amalia Newman is a 50 year old female who presents to clinic today for the following health issues:    HPI   ED/UC Followup:    Facility:  [facility]  Date of visit: 6/1/2020  Reason for visit: Acute knee pain  Current Status: \"Pained but better with Advil\"           Patient Active Problem List   Diagnosis     Pure hyperglyceridemia     CARDIOVASCULAR SCREENING; LDL GOAL LESS THAN 130     Abnormal glucose     Benign essential hypertension     Sepsis (H)     Infection due to ESBL-producing Escherichia coli     E. coli sepsis (H)     Sepsis due to gram-negative UTI (H)     Past Surgical History:   Procedure Laterality Date     DILATION AND CURETTAGE  1996     ENT SURGERY  2013    wisdom teeth      HYSTERECTOMY RADICAL  2006    open hysterectomy ; ovaries are left in        Social History     Tobacco Use     Smoking status: Never Smoker     Smokeless tobacco: Never Used   Substance Use Topics     Alcohol use: Yes     Comment: \"very rare\"     Family History   Problem Relation Age of Onset     Hypertension Mother      Lipids Mother      Cerebrovascular Disease Mother      C.A.D. Father         58     Hypertension Father      Diabetes Father      Vascular Disease Father         PVD     Hypertension Brother      Cancer Paternal Grandmother         Abdominal     Cancer Paternal Grandfather         Liver     Hypertension Maternal Grandmother          Current Outpatient Medications   Medication Sig Dispense Refill     ascorbic acid (VITAMIN C) 500 MG tablet Take 2 tablets (1,000 mg) by mouth daily 30 tablet      diltiazem ER COATED BEADS (CARDIZEM CD) 360 MG 24 hr capsule Take 1 capsule (360 mg) by mouth daily 30 capsule 11     ibuprofen (ADVIL/MOTRIN) 200 MG tablet Take 200 mg by mouth every 4 hours as needed for mild pain       lisinopril (ZESTRIL) 10 MG tablet Take 1 tablet (10 mg) by mouth daily 30 tablet 11     BP Readings from Last 3 Encounters:   06/01/20 (!) 148/81   03/23/20 128/74   12/04/19 124/72    " "Wt Readings from Last 3 Encounters:   12/04/19 90.7 kg (200 lb)   09/05/19 86.3 kg (190 lb 4.1 oz)   06/24/19 86.7 kg (191 lb 3.2 oz)                      Reviewed and updated as needed this visit by Provider         Review of Systems   Constitutional, HEENT, cardiovascular, pulmonary, gi and gu systems are negative, except as otherwise noted.      Objective    LMP 12/06/2005 (LMP Unknown)   There is no height or weight on file to calculate BMI.  Physical Exam               Assessment & Plan       ICD-10-CM    1. Acute pain of left knee  M25.562         BMI:   Estimated body mass index is 36 kg/m  as calculated from the following:    Height as of 12/4/19: 1.588 m (5' 2.5\").    Weight as of 12/4/19: 90.7 kg (200 lb).           NSAIDS, ortho f/u as planned    Stacey Desai NP  [facility]      Phone time 13 min      "

## 2020-06-02 NOTE — ED PROVIDER NOTES
History     Chief Complaint:  Knee Pain     The history is provided by the patient.      Amalia Newman is a 50 year old female who presents with knee pain. The patient notes pain behind her knee over the last few weeks that has improved with Tylenol. It has been worsening recently, causing more pain and cramping with movement. Today, the patient had no trouble with activity though is now unable to bear weight due to the pain. She took 1 Advil around 1650 and 2 more around 1845 with some improvement. The patient denies sensation of clicking or locking in her knee, as well as recent fall. She has no history of blood clot or recent travel.    Allergies:  Bactrim [Sulfamethoxazole W/Trimethoprim]  Vicodin [Hydrocodone-Acetaminophen]     Medications:    diltiazem  lisinopril     Past Medical History:    History of E coli sepsis  Hypertension     Past Surgical History:    D&C  [place] teeth extraction  hysterectomy     Family History:    Mother - hypertension, hyperlipidemia, cerebrovascular disease  Father - coronary artery disease, hypertension, diabetes, PVD  Brother - hypertension      Social History:  Presents alone.   Tobacco use: negative   Alcohol use: very rarely  PCP: Amina Olivares     Review of Systems   Musculoskeletal:        Left knee pain    All other systems reviewed and are negative.    Physical Exam     Patient Vitals for the past 24 hrs:   BP Pulse Resp SpO2   06/01/20 2200 (!) 148/81 94 -- --   06/01/20 1942 (!) 176/99 107 18 99 %        Physical Exam  General: Patient is alert and cooperative.  HENT:  Normal appearance.  Eyes: EOMI. Normal conjunctiva.  Neck:  Normal range of motion and appearance.   Cardiovascular:  Normal rate; no lower leg edema or swelling.  Normal distal perfusion.  Pulmonary/Chest:  Effort normal.   Musculoskeletal: fairly normal appearing left knee.  Given body habitus, difficult to rule out small effusion.  Trace calor.  No overlying skin erythema.  Normal ROM;  stable.   Neurological: oriented, normal strength, sensation, and coordination.   Skin: Warm and dry. No rash or bruising.   Psychiatric: Normal mood and affect. Normal behavior and judgement.    Emergency Department Course     Imaging:  Radiology findings were communicated with the patient who voiced understanding of the findings.    XR left knee 3 views:  Normal joint spaces and alignment. No fracture or joint effusion, as per radiology.     US left lower extremity venous duplex:  1.  No deep venous thrombosis in the left lower extremity, as per radiology.      Emergency Department Course:  Past medical records, nursing notes, and vitals reviewed.    ED Course as of Jun 01 2310 Mon Jun 01, 2020 2058 I performed an exam of the patient as documented above.      2105 The patient was sent for a left knee x-ray and left leg ultrasound while in the emergency department, results above.       2253 Patient rechecked and updated.        I personally reviewed the imaging results with the Patient and answered all related questions prior to discharge. Crutches provided.    Impression & Plan     Medical Decision Making:  Amalia Newman is a 50 year old female who presents to the emergency department today with atraumatic left knee pain, present with weight bearing only.  Exam and history suggest musculoskeletal etiology; no concern for septic arthritis or crystal arthropathy.  Also, localizes some pain behind knee.  LLE US negative, including no Baker's cyst, no DVT.  Normal plain x-ray.  No indication for emergency MRI.  Recommend crutches, WBAT, f/u Ortho for further evaluation, management.     Discharge Diagnosis:    ICD-10-CM    1. Acute pain of left knee  M25.562      Disposition:  Discharged to home.    Scribe Disclosure:  I, Cassandra Chairez, am serving as a scribe at 8:58 PM on 6/1/2020 to document services personally performed by Reed Iraheta MD based on my observations and the provider's statements to me.        Reed Iraheta MD  06/02/20 2476

## 2020-06-02 NOTE — PROGRESS NOTES
"Amalia Newman is a 50 year old female who is being evaluated via a billable telephone visit.      The patient has been notified of following:     \"This telephone visit will be conducted via a call between you and your physician/provider. We have found that certain health care needs can be provided without the need for a physical exam.  This service lets us provide the care you need with a short phone conversation.  If a prescription is necessary we can send it directly to your pharmacy.  If lab work is needed we can place an order for that and you can then stop by our lab to have the test done at a later time.    Telephone visits are billed at different rates depending on your insurance coverage. During this emergency period, for some insurers they may be billed the same as an in-person visit.  Please reach out to your insurance provider with any questions.    If during the course of the call the physician/provider feels a telephone visit is not appropriate, you will not be charged for this service.\"    Patient has given verbal consent for Telephone visit?  Yes    What phone number would you like to be contacted at? [phone]    How would you like to obtain your AVS? Mail a copy    Subjective     Amalia Newman is a 50 year old female who presents via phone visit today for the following health issues:    [facility]    Hospital Follow-up Visit:    Hospital/Nursing Home/IP Rehab Facility: [facility]  Date of Admission: 6/1/2020  Date of Discharge: 6/1/2020  Reason(s) for Admission: Knee pain      Was your hospitalization related to COVID-19? No   Problems taking medications regularly:  None  Medication changes since discharge: None  Problems adhering to non-medication therapy:  None    Summary of hospitalization:  [facility] discharge summary reviewed  Diagnostic Tests/Treatments reviewed.  Follow up needed: {NONE DEFAULTED:180571::\"none\"}  Other Healthcare Providers Involved in Patient s " "Care:         {those currently involved after discharge:320479::\"None\"}  Update since discharge: {IMPROVED DEFAULT:116098::\"improved.\"} {TIP  Include information from family/caregivers, SNF, Care Coordination :721218}      Post Discharge Medication Reconciliation: {ACO Med Rec (Provider):459223}.  Plan of care communicated with {Communicate Plan to:615752::\"patient\"}     {Reference  Coding guidelines- Moderate Complexity F2F/Video within 7 - 14 days of discharge 85695, High Complexity F2F/Video within 7 days 00465 or ftvvio51 days 45905 :513983}         {PEDS Chronic and Acute Problems (Optional):568998}     {additonal problems for provider to add (Optional):089776}    {HIST REVIEW/ LINKS 2 (Optional):810435}    Reviewed and updated as needed this visit by Provider         Review of Systems   {ROS COMP (Optional):706292}       Objective   Reported vitals:  LMP 12/06/2005 (LMP Unknown)    {GENERAL APPEARANCE:50::\"healthy\",\"alert\",\"no distress\"}  PSYCH: Alert and oriented times 3; coherent speech, normal   rate and volume, able to articulate logical thoughts, able   to abstract reason, no tangential thoughts, no hallucinations   or delusions  Her affect is { :8184075::\"normal\"}  RESP: No cough, no audible wheezing, able to talk in full sentences  Remainder of exam unable to be completed due to telephone visits    {Diagnostic Test Results (Optional):050817::\"Diagnostic Test Results:\",\"Labs reviewed in Epic\"}        Assessment/Plan:  {Diagnosis, Associated Orders and Comment:925496}    No follow-ups on file.      Phone call duration:  *** minutes    {signature options:931604}        "

## 2020-06-02 NOTE — ED TRIAGE NOTES
Left knee pain x3 weeks. Was minimal pain. Wasn't able to treat with tylenol or advil. Now swollen, pain is worse can't walk on it. Denies trauma or new exercise. Denies COVID symptoms.

## 2020-06-10 DIAGNOSIS — E78.1 PURE HYPERGLYCERIDEMIA: ICD-10-CM

## 2020-06-10 DIAGNOSIS — I10 BENIGN ESSENTIAL HYPERTENSION: ICD-10-CM

## 2020-06-10 DIAGNOSIS — R73.09 ELEVATED GLUCOSE: ICD-10-CM

## 2020-06-10 LAB
ALBUMIN SERPL-MCNC: 3.3 G/DL (ref 3.4–5)
ALP SERPL-CCNC: 24 U/L (ref 40–150)
ALT SERPL W P-5'-P-CCNC: 19 U/L (ref 0–50)
ANION GAP SERPL CALCULATED.3IONS-SCNC: 4 MMOL/L (ref 3–14)
AST SERPL W P-5'-P-CCNC: 8 U/L (ref 0–45)
BASOPHILS # BLD AUTO: 0 10E9/L (ref 0–0.2)
BASOPHILS NFR BLD AUTO: 0.4 %
BILIRUB SERPL-MCNC: 0.2 MG/DL (ref 0.2–1.3)
BUN SERPL-MCNC: 11 MG/DL (ref 7–30)
CALCIUM SERPL-MCNC: 9 MG/DL (ref 8.5–10.1)
CHLORIDE SERPL-SCNC: 105 MMOL/L (ref 94–109)
CHOLEST SERPL-MCNC: 200 MG/DL
CO2 SERPL-SCNC: 25 MMOL/L (ref 20–32)
CREAT SERPL-MCNC: 0.38 MG/DL (ref 0.52–1.04)
DIFFERENTIAL METHOD BLD: NORMAL
EOSINOPHIL # BLD AUTO: 0.2 10E9/L (ref 0–0.7)
EOSINOPHIL NFR BLD AUTO: 2.7 %
ERYTHROCYTE [DISTWIDTH] IN BLOOD BY AUTOMATED COUNT: 12.7 % (ref 10–15)
GFR SERPL CREATININE-BSD FRML MDRD: >90 ML/MIN/{1.73_M2}
GLUCOSE SERPL-MCNC: 107 MG/DL (ref 70–99)
HBA1C MFR BLD: 5.7 % (ref 0–5.6)
HCT VFR BLD AUTO: 37.8 % (ref 35–47)
HDLC SERPL-MCNC: 43 MG/DL
HGB BLD-MCNC: 12.5 G/DL (ref 11.7–15.7)
LDLC SERPL CALC-MCNC: 112 MG/DL
LYMPHOCYTES # BLD AUTO: 1.3 10E9/L (ref 0.8–5.3)
LYMPHOCYTES NFR BLD AUTO: 17.3 %
MCH RBC QN AUTO: 30.6 PG (ref 26.5–33)
MCHC RBC AUTO-ENTMCNC: 33.1 G/DL (ref 31.5–36.5)
MCV RBC AUTO: 93 FL (ref 78–100)
MONOCYTES # BLD AUTO: 0.6 10E9/L (ref 0–1.3)
MONOCYTES NFR BLD AUTO: 8.2 %
NEUTROPHILS # BLD AUTO: 5.3 10E9/L (ref 1.6–8.3)
NEUTROPHILS NFR BLD AUTO: 71.4 %
NONHDLC SERPL-MCNC: 157 MG/DL
PLATELET # BLD AUTO: 262 10E9/L (ref 150–450)
POTASSIUM SERPL-SCNC: 4.3 MMOL/L (ref 3.4–5.3)
PROT SERPL-MCNC: 7.1 G/DL (ref 6.8–8.8)
RBC # BLD AUTO: 4.08 10E12/L (ref 3.8–5.2)
SODIUM SERPL-SCNC: 134 MMOL/L (ref 133–144)
TRIGL SERPL-MCNC: 224 MG/DL
TSH SERPL DL<=0.005 MIU/L-ACNC: 1.84 MU/L (ref 0.4–4)
WBC # BLD AUTO: 7.5 10E9/L (ref 4–11)

## 2020-06-10 PROCEDURE — 36415 COLL VENOUS BLD VENIPUNCTURE: CPT | Performed by: NURSE PRACTITIONER

## 2020-06-10 PROCEDURE — 83036 HEMOGLOBIN GLYCOSYLATED A1C: CPT | Performed by: NURSE PRACTITIONER

## 2020-06-10 PROCEDURE — 80061 LIPID PANEL: CPT | Performed by: NURSE PRACTITIONER

## 2020-06-10 PROCEDURE — 82043 UR ALBUMIN QUANTITATIVE: CPT | Performed by: NURSE PRACTITIONER

## 2020-06-10 PROCEDURE — 80050 GENERAL HEALTH PANEL: CPT | Performed by: NURSE PRACTITIONER

## 2020-06-11 LAB
CREAT UR-MCNC: 22 MG/DL
MICROALBUMIN UR-MCNC: 5 MG/L
MICROALBUMIN/CREAT UR: 22.84 MG/G CR (ref 0–25)

## 2020-12-14 ENCOUNTER — HOSPITAL ENCOUNTER (OUTPATIENT)
Dept: MAMMOGRAPHY | Facility: CLINIC | Age: 50
Discharge: HOME | End: 2020-12-14
Attending: NURSE PRACTITIONER | Admitting: NURSE PRACTITIONER
Payer: COMMERCIAL

## 2020-12-14 DIAGNOSIS — N64.89 BREAST ASYMMETRY: ICD-10-CM

## 2020-12-14 PROCEDURE — G0279 TOMOSYNTHESIS, MAMMO: HCPCS

## 2021-03-11 DIAGNOSIS — I10 BENIGN ESSENTIAL HYPERTENSION: ICD-10-CM

## 2021-03-12 RX ORDER — DILTIAZEM HYDROCHLORIDE 360 MG/1
CAPSULE, EXTENDED RELEASE ORAL
Qty: 30 CAPSULE | Refills: 11 | Status: SHIPPED | OUTPATIENT
Start: 2021-03-12 | End: 2021-05-25

## 2021-03-12 NOTE — TELEPHONE ENCOUNTER
Pending Prescriptions:                       Disp   Refills    diltiazem ER COATED BEADS (CARDIZEM CD) 36*30 cap*11       Sig: TAKE 1 CAPSULE BY MOUTH EVERY DAY    Routing refill request to provider for review/approval because:  BP Readings from Last 3 Encounters:   06/01/20 (!) 148/81   03/23/20 128/74   12/04/19 124/72

## 2021-05-25 ENCOUNTER — OFFICE VISIT (OUTPATIENT)
Dept: INTERNAL MEDICINE | Facility: CLINIC | Age: 51
End: 2021-05-25
Payer: COMMERCIAL

## 2021-05-25 VITALS
WEIGHT: 199 LBS | HEART RATE: 84 BPM | HEIGHT: 63 IN | TEMPERATURE: 98.1 F | DIASTOLIC BLOOD PRESSURE: 82 MMHG | OXYGEN SATURATION: 98 % | BODY MASS INDEX: 35.26 KG/M2 | SYSTOLIC BLOOD PRESSURE: 126 MMHG | RESPIRATION RATE: 18 BRPM

## 2021-05-25 DIAGNOSIS — Z00.00 ROUTINE GENERAL MEDICAL EXAMINATION AT A HEALTH CARE FACILITY: Primary | ICD-10-CM

## 2021-05-25 DIAGNOSIS — Z11.59 NEED FOR HEPATITIS C SCREENING TEST: ICD-10-CM

## 2021-05-25 DIAGNOSIS — Z12.11 SCREEN FOR COLON CANCER: ICD-10-CM

## 2021-05-25 DIAGNOSIS — E66.01 MORBID OBESITY (H): ICD-10-CM

## 2021-05-25 DIAGNOSIS — L30.9 DERMATITIS: ICD-10-CM

## 2021-05-25 DIAGNOSIS — Z12.31 ENCOUNTER FOR SCREENING MAMMOGRAM FOR BREAST CANCER: ICD-10-CM

## 2021-05-25 DIAGNOSIS — I10 BENIGN ESSENTIAL HYPERTENSION: ICD-10-CM

## 2021-05-25 DIAGNOSIS — R73.03 PREDIABETES: ICD-10-CM

## 2021-05-25 LAB
ALBUMIN SERPL-MCNC: 4 G/DL (ref 3.4–5)
ALP SERPL-CCNC: 36 U/L (ref 40–150)
ALT SERPL W P-5'-P-CCNC: 41 U/L (ref 0–50)
ANION GAP SERPL CALCULATED.3IONS-SCNC: 6 MMOL/L (ref 3–14)
AST SERPL W P-5'-P-CCNC: 32 U/L (ref 0–45)
BASOPHILS # BLD AUTO: 0.1 10E9/L (ref 0–0.2)
BASOPHILS NFR BLD AUTO: 0.7 %
BILIRUB SERPL-MCNC: 0.4 MG/DL (ref 0.2–1.3)
BUN SERPL-MCNC: 9 MG/DL (ref 7–30)
CALCIUM SERPL-MCNC: 9.9 MG/DL (ref 8.5–10.1)
CHLORIDE SERPL-SCNC: 104 MMOL/L (ref 94–109)
CHOLEST SERPL-MCNC: 241 MG/DL
CO2 SERPL-SCNC: 25 MMOL/L (ref 20–32)
CREAT SERPL-MCNC: 0.4 MG/DL (ref 0.52–1.04)
DIFFERENTIAL METHOD BLD: NORMAL
EOSINOPHIL # BLD AUTO: 0.2 10E9/L (ref 0–0.7)
EOSINOPHIL NFR BLD AUTO: 2.8 %
ERYTHROCYTE [DISTWIDTH] IN BLOOD BY AUTOMATED COUNT: 12.5 % (ref 10–15)
GFR SERPL CREATININE-BSD FRML MDRD: >90 ML/MIN/{1.73_M2}
GLUCOSE SERPL-MCNC: 106 MG/DL (ref 70–99)
HBA1C MFR BLD: 6.1 % (ref 0–5.6)
HCT VFR BLD AUTO: 43.1 % (ref 35–47)
HDLC SERPL-MCNC: 49 MG/DL
HGB BLD-MCNC: 14.6 G/DL (ref 11.7–15.7)
LDLC SERPL CALC-MCNC: 136 MG/DL
LYMPHOCYTES # BLD AUTO: 1.2 10E9/L (ref 0.8–5.3)
LYMPHOCYTES NFR BLD AUTO: 17.6 %
MCH RBC QN AUTO: 31.1 PG (ref 26.5–33)
MCHC RBC AUTO-ENTMCNC: 33.9 G/DL (ref 31.5–36.5)
MCV RBC AUTO: 92 FL (ref 78–100)
MONOCYTES # BLD AUTO: 0.6 10E9/L (ref 0–1.3)
MONOCYTES NFR BLD AUTO: 9.2 %
NEUTROPHILS # BLD AUTO: 4.8 10E9/L (ref 1.6–8.3)
NEUTROPHILS NFR BLD AUTO: 69.7 %
NONHDLC SERPL-MCNC: 192 MG/DL
PLATELET # BLD AUTO: 268 10E9/L (ref 150–450)
POTASSIUM SERPL-SCNC: 4.2 MMOL/L (ref 3.4–5.3)
PROT SERPL-MCNC: 7.8 G/DL (ref 6.8–8.8)
RBC # BLD AUTO: 4.69 10E12/L (ref 3.8–5.2)
SODIUM SERPL-SCNC: 135 MMOL/L (ref 133–144)
TRIGL SERPL-MCNC: 280 MG/DL
TSH SERPL DL<=0.005 MIU/L-ACNC: 2.66 MU/L (ref 0.4–4)
WBC # BLD AUTO: 6.8 10E9/L (ref 4–11)

## 2021-05-25 PROCEDURE — 80061 LIPID PANEL: CPT | Performed by: NURSE PRACTITIONER

## 2021-05-25 PROCEDURE — 86803 HEPATITIS C AB TEST: CPT | Performed by: NURSE PRACTITIONER

## 2021-05-25 PROCEDURE — 80050 GENERAL HEALTH PANEL: CPT | Performed by: NURSE PRACTITIONER

## 2021-05-25 PROCEDURE — 82043 UR ALBUMIN QUANTITATIVE: CPT | Performed by: NURSE PRACTITIONER

## 2021-05-25 PROCEDURE — 83036 HEMOGLOBIN GLYCOSYLATED A1C: CPT | Performed by: NURSE PRACTITIONER

## 2021-05-25 PROCEDURE — 36415 COLL VENOUS BLD VENIPUNCTURE: CPT | Performed by: NURSE PRACTITIONER

## 2021-05-25 PROCEDURE — 99396 PREV VISIT EST AGE 40-64: CPT | Performed by: NURSE PRACTITIONER

## 2021-05-25 RX ORDER — TRIAMCINOLONE ACETONIDE 1 MG/G
CREAM TOPICAL 2 TIMES DAILY
Qty: 453 G | Refills: 3 | Status: SHIPPED | OUTPATIENT
Start: 2021-05-25 | End: 2022-04-21

## 2021-05-25 RX ORDER — DILTIAZEM HYDROCHLORIDE 360 MG/1
CAPSULE, EXTENDED RELEASE ORAL
Qty: 30 CAPSULE | Refills: 11 | Status: SHIPPED | OUTPATIENT
Start: 2021-05-25 | End: 2022-04-21

## 2021-05-25 RX ORDER — LISINOPRIL 10 MG/1
10 TABLET ORAL DAILY
Qty: 30 TABLET | Refills: 11 | Status: SHIPPED | OUTPATIENT
Start: 2021-05-25 | End: 2022-04-21

## 2021-05-25 ASSESSMENT — ENCOUNTER SYMPTOMS
JOINT SWELLING: 0
HEADACHES: 0
CONSTIPATION: 0
NAUSEA: 0
FREQUENCY: 0
DIZZINESS: 0
SHORTNESS OF BREATH: 0
DYSURIA: 0
HEMATOCHEZIA: 0
WEAKNESS: 0
COUGH: 0
MYALGIAS: 0
NERVOUS/ANXIOUS: 0
BREAST MASS: 0
PARESTHESIAS: 0
FEVER: 0
HEMATURIA: 0
ABDOMINAL PAIN: 0
CHILLS: 0
SORE THROAT: 0
HEARTBURN: 0
DIARRHEA: 0
ARTHRALGIAS: 0
PALPITATIONS: 0
EYE PAIN: 0

## 2021-05-25 ASSESSMENT — MIFFLIN-ST. JEOR: SCORE: 1473.85

## 2021-05-25 NOTE — PROGRESS NOTES
"   SUBJECTIVE:   CC: Amalia Newman is an 51 year old woman who presents for preventive health visit.       Patient has been advised of split billing requirements and indicates understanding:   Healthy Habits:     Getting at least 3 servings of Calcium per day:  Yes    Bi-annual eye exam:  Yes    Dental care twice a year:  NO    Sleep apnea or symptoms of sleep apnea:  None    Diet:  Regular (no restrictions)    Frequency of exercise:  2-3 days/week    Duration of exercise:  Less than 15 minutes    Medication side effects:  None    PHQ-2 Total Score: 0    Additional concerns today:  No              Today's PHQ-2 Score:   PHQ-2 ( 1999 [employer]) 5/25/2021   Q1: Little interest or pleasure in doing things 0   Q2: Feeling down, depressed or hopeless 0   PHQ-2 Score 0   Q1: Little interest or pleasure in doing things Not at all   Q2: Feeling down, depressed or hopeless Not at all   PHQ-2 Score 0       Abuse: Current or Past (Physical, Sexual or Emotional) - No  Do you feel safe in your environment? Yes    Have you ever done Advance Care Planning? (For example, a Health Directive, POLST, or a discussion with a medical provider or your loved ones about your wishes): Yes, patient states has an Advance Care Planning document and will bring a copy to the clinic.    Social History     Tobacco Use     Smoking status: Never Smoker     Smokeless tobacco: Never Used   Substance Use Topics     Alcohol use: Yes     Comment: \"very rare\"         Alcohol Use 5/25/2021   Prescreen: >3 drinks/day or >7 drinks/week? No   Prescreen: >3 drinks/day or >7 drinks/week? -       Reviewed orders with patient.  Reviewed health maintenance and updated orders accordingly - Yes      Breast Cancer Screening:  Any new diagnosis of family breast, ovarian, or bowel cancer? No    FSH-7: No flowsheet data found.      Pertinent mammograms are reviewed under the imaging tab.    History of abnormal Pap smear:   PAP / HPV 1/22/2008 1/9/2007 12/13/2005   PAP " "NIL NIL NIL   s/p hysterectomy  2006    Reviewed and updated as needed this visit by clinical staff  Tobacco  Allergies  Meds  Problems  Med Hx  Surg Hx  Fam Hx  Soc Hx          Reviewed and updated as needed this visit by Provider                    Review of Systems   Constitutional: Negative for chills and fever.   HENT: Negative for congestion, ear pain, hearing loss and sore throat.    Eyes: Negative for pain and visual disturbance.   Respiratory: Negative for cough and shortness of breath.    Cardiovascular: Negative for chest pain, palpitations and peripheral edema.   Gastrointestinal: Negative for abdominal pain, constipation, diarrhea, heartburn, hematochezia and nausea.   Breasts:  Negative for tenderness, breast mass and discharge.   Genitourinary: Negative for dysuria, frequency, genital sores, hematuria, pelvic pain, urgency, vaginal bleeding and vaginal discharge.   Musculoskeletal: Negative for arthralgias, joint swelling and myalgias.   Skin: Positive for rash.   Neurological: Negative for dizziness, weakness, headaches and paresthesias.   Psychiatric/Behavioral: Negative for mood changes. The patient is not nervous/anxious.      She has a dermatitis on her lower arm - this is recurrent and triamcinolone helped in past      OBJECTIVE:   Ht 1.588 m (5' 2.5\")   LMP 12/06/2005 (LMP Unknown)   BMI 36.00 kg/m    Physical Exam  GENERAL: alert and no distress  EYES: Eyes grossly normal to inspection,  and conjunctivae and sclerae normal  HENT: ear canals and TM's normal, nose and mouth without ulcers or lesions  NECK: no adenopathy, no asymmetry, masses, or scars and thyroid normal to palpation  RESP: lungs clear to auscultation - no rales, rhonchi or wheezes  BREAST: large without masses, tenderness or nipple discharge and no palpable axillary masses or adenopathy  CV: regular rate and rhythm, normal S1 S2, no S3 or S4, no murmur, click or rub, no peripheral edema and peripheral pulses " strong  ABDOMEN: soft, nontender, no hepatosplenomegaly, no masses and bowel sounds normal  MS: no gross musculoskeletal defects noted, no edema  SKIN: she has a raised red rash - scratched and scabs present   Similar to previous rash treated with triamcinolone   NEURO: Normal strength and tone, mentation intact and speech normal  PSYCH: mentation appears normal, affect normal/bright    Diagnostic Test Results:  Labs reviewed in [facility]  Lab  cologuard     ASSESSMENT/PLAN:   1. Routine general medical examination at a health care facility      2. Benign essential hypertension  In good range   - diltiazem ER COATED BEADS (CARDIZEM CD) 360 MG 24 hr capsule; TAKE 1 CAPSULE BY MOUTH EVERY DAY  Dispense: 30 capsule; Refill: 11  - lisinopril (ZESTRIL) 10 MG tablet; Take 1 tablet (10 mg) by mouth daily  Dispense: 30 tablet; Refill: 11  - Lipid panel reflex to direct LDL Fasting  - Comprehensive metabolic panel (BMP + Alb, Alk Phos, ALT, AST, Total. Bili, TP)  - TSH with free T4 reflex  - CBC with platelets and differential  - Albumin Random Urine Quantitative with Creat Ratio    3. Dermatitis  Discussed   - triamcinolone (KENALOG) 0.1 % external cream; Apply topically 2 times daily  Dispense: 453 g; Refill: 3    4. Morbid obesity (H)  Discussed   - Comprehensive metabolic panel (BMP + Alb, Alk Phos, ALT, AST, Total. Bili, TP)  - TSH with free T4 reflex  - CBC with platelets and differential  - Hemoglobin A1c    5. Screen for colon cancer  Does not want to do colonoscopy and no family history colon cancer   - COLOGUARD([employer])    6. Need for hepatitis C screening test    - Hepatitis C Screen Reflex to HCV RNA Quant and Genotype    7. Prediabetes    - Lipid panel reflex to direct LDL Fasting  - Comprehensive metabolic panel (BMP + Alb, Alk Phos, ALT, AST, Total. Bili, TP)  - TSH with free T4 reflex  - CBC with platelets and differential  - Hemoglobin A1c  - Albumin Random Urine Quantitative with Creat Ratio    8.  "Encounter for screening mammogram for breast cancer    - *MA Screening Digital Bilateral; Future    Patient has been advised of split billing requirements and indicates understanding:   COUNSELING:  Reviewed preventive health counseling, as reflected in patient instructions       Regular exercise       Healthy diet/nutrition       Osteoporosis prevention/bone health       Consider Hep C screening for all patients one time for ages 18-79 years    Estimated body mass index is 36 kg/m  as calculated from the following:    Height as of this encounter: 1.588 m (5' 2.5\").    Weight as of 12/4/19: 90.7 kg (200 lb).        She reports that she has never smoked. She has never used smokeless tobacco.      Counseling Resources:  ATP IV Guidelines  Pooled Cohorts Equation Calculator  Breast Cancer Risk Calculator  BRCA-Related Cancer Risk Assessment: FHS-7 Tool  FRAX Risk Assessment  ICSI Preventive Guidelines  Dietary Guidelines for Americans, 2010  USDA's MyPlate  ASA Prophylaxis  Lung CA Screening    WADE Dillon CNP  [facility]  "

## 2021-05-25 NOTE — NURSING NOTE
"Chief Complaint   Patient presents with     Physical     fasting     initial /82   Pulse 84   Temp 98.1  F (36.7  C) (Oral)   Resp 18   Ht 1.588 m (5' 2.5\")   Wt 90.3 kg (199 lb)   LMP 12/06/2005 (LMP Unknown)   SpO2 98%   BMI 35.82 kg/m   Estimated body mass index is 35.82 kg/m  as calculated from the following:    Height as of this encounter: 1.588 m (5' 2.5\").    Weight as of this encounter: 90.3 kg (199 lb)..  bp completed using cuff size large  CALROS MACKENZIE LPN  "

## 2021-05-25 NOTE — PATIENT INSTRUCTIONS
Lab in [address]    Triamcinolone cream to rash area     Consider taking a Zyrtec/ cetirizine 1 tab daily until rash is gone      Medication refills

## 2021-05-26 LAB
CREAT UR-MCNC: 52 MG/DL
HCV AB SERPL QL IA: NONREACTIVE
MICROALBUMIN UR-MCNC: 11 MG/L
MICROALBUMIN/CREAT UR: 20.46 MG/G CR (ref 0–25)

## 2021-06-14 ENCOUNTER — HOSPITAL ENCOUNTER (OUTPATIENT)
Dept: MAMMOGRAPHY | Facility: CLINIC | Age: 51
Discharge: HOME | End: 2021-06-14
Attending: NURSE PRACTITIONER | Admitting: NURSE PRACTITIONER
Payer: COMMERCIAL

## 2021-06-14 ENCOUNTER — TELEPHONE (OUTPATIENT)
Dept: INTERNAL MEDICINE | Facility: CLINIC | Age: 51
End: 2021-06-14

## 2021-06-14 DIAGNOSIS — Z12.31 ENCOUNTER FOR SCREENING MAMMOGRAM FOR BREAST CANCER: ICD-10-CM

## 2021-06-14 PROCEDURE — 77063 BREAST TOMOSYNTHESIS BI: CPT

## 2021-06-14 NOTE — TELEPHONE ENCOUNTER
"Patient calls after seeing a commercial for weight loss for a medication that started with a W and said it is an injectable and a \"game changer.\"  I looked online and it sounds like it is Wegovy (semaglutide).  Patient is asking to try.  Please advise.  "

## 2021-06-14 NOTE — TELEPHONE ENCOUNTER
I have never used for weight loss  I am asking pharmacist for information   Not sure I will be comfortable using it - but will wait to see what information available   There is a weight loss clinic for [place] that may be more familiar with this and I am willing to refer her to them if she wishes   Let me know

## 2021-06-15 NOTE — TELEPHONE ENCOUNTER
Attempted to contact patient. Left voice message to call back.     Please inform patient of message from Amina.

## 2021-06-29 NOTE — TELEPHONE ENCOUNTER
I am worried about this medication as it could cause harm. I am happy to giver her referral to weight loss clinic that may have more experience using this for weight loss- or endocrine referral for same   I am just not comfortable to use this that way they want it used. I am sorry  Let me know if she wants one of the referrals above

## 2021-06-29 NOTE — TELEPHONE ENCOUNTER
Attempted to contact patient. Left voice message to call back.   Please advise patient of Amina's message.

## 2021-07-19 ENCOUNTER — LAB REQUISITION (OUTPATIENT)
Dept: LAB | Facility: CLINIC | Age: 51
End: 2021-07-19
Payer: COMMERCIAL

## 2021-07-19 PROCEDURE — U0003 INFECTIOUS AGENT DETECTION BY NUCLEIC ACID (DNA OR RNA); SEVERE ACUTE RESPIRATORY SYNDROME CORONAVIRUS 2 (SARS-COV-2) (CORONAVIRUS DISEASE [COVID-19]), AMPLIFIED PROBE TECHNIQUE, MAKING USE OF HIGH THROUGHPUT TECHNOLOGIES AS DESCRIBED BY CMS-2020-01-R: HCPCS | Mod: ORL | Performed by: INTERNAL MEDICINE

## 2021-07-23 LAB — SARS-COV-2 RNA RESP QL NAA+PROBE: NEGATIVE

## 2022-01-17 ENCOUNTER — APPOINTMENT (OUTPATIENT)
Dept: URGENT CARE | Facility: URGENT CARE | Age: 52
End: 2022-01-17
Payer: COMMERCIAL

## 2022-01-17 ENCOUNTER — LAB REQUISITION (OUTPATIENT)
Dept: LAB | Facility: CLINIC | Age: 52
End: 2022-01-17

## 2022-01-17 LAB — SARS-COV-2 RNA RESP QL NAA+PROBE: POSITIVE

## 2022-01-17 PROCEDURE — U0005 INFEC AGEN DETEC AMPLI PROBE: HCPCS | Performed by: INTERNAL MEDICINE

## 2022-02-19 ENCOUNTER — HEALTH MAINTENANCE LETTER (OUTPATIENT)
Age: 52
End: 2022-02-19

## 2022-03-30 ENCOUNTER — OFFICE VISIT (OUTPATIENT)
Dept: URGENT CARE | Facility: URGENT CARE | Age: 52
End: 2022-03-30
Payer: COMMERCIAL

## 2022-03-30 VITALS — TEMPERATURE: 97.9 F | HEART RATE: 87 BPM | DIASTOLIC BLOOD PRESSURE: 78 MMHG | SYSTOLIC BLOOD PRESSURE: 140 MMHG

## 2022-03-30 DIAGNOSIS — I10 BENIGN ESSENTIAL HYPERTENSION: ICD-10-CM

## 2022-03-30 DIAGNOSIS — H92.02 OTALGIA, LEFT: Primary | ICD-10-CM

## 2022-03-30 DIAGNOSIS — H60.392 INFECTIVE OTITIS EXTERNA, LEFT: ICD-10-CM

## 2022-03-30 PROCEDURE — 99213 OFFICE O/P EST LOW 20 MIN: CPT | Performed by: FAMILY MEDICINE

## 2022-03-30 RX ORDER — NEOMYCIN SULFATE, POLYMYXIN B SULFATE AND HYDROCORTISONE 10; 3.5; 1 MG/ML; MG/ML; [USP'U]/ML
3 SUSPENSION/ DROPS AURICULAR (OTIC) 4 TIMES DAILY
Qty: 6 ML | Refills: 0 | Status: SHIPPED | OUTPATIENT
Start: 2022-03-30 | End: 2022-04-09

## 2022-03-30 NOTE — PROGRESS NOTES
"SUBJECTIVE: Amalia Newman is a 52 year old female presenting with a chief complaint of ear pain left.  Onset of symptoms was day(s) ago.  Course of illness is worsening.    Current and Associated symptoms: elevated BP  Treatment measures tried include none.  Predisposing factors include HX of HTN.    Past Medical History:   Diagnosis Date     E. coli sepsis (H) 6/1/2019     Essential hypertension, benign 1996     Infection due to ESBL-producing Escherichia coli 6/1/2019     Pure hyperglyceridemia      Tachycardia      Allergies   Allergen Reactions     Bactrim [Sulfamethoxazole W/Trimethoprim]      Vicodin [Hydrocodone-Acetaminophen]      Angry, changes mood.      Social History     Tobacco Use     Smoking status: Never Smoker     Smokeless tobacco: Never Used   Substance Use Topics     Alcohol use: Yes     Comment: \"very rare\"       ROS:  SKIN: no rash  GI: no vomiting    OBJECTIVE:  BP (!) 140/78   Pulse 87   Temp 97.9  F (36.6  C) (Tympanic)   LMP 12/06/2005 (LMP Unknown) GENERAL APPEARANCE: healthy, alert and no distress  EYES: EOMI,  PERRL, conjunctiva clear  HENT: TM's normal bilaterally and external ear canal inflamed left  RESP: lungs clear to auscultation - no rales, rhonchi or wheezes  CV: regular rates and rhythm, normal S1 S2, no murmur noted  SKIN: no suspicious lesions or rashes      ICD-10-CM    1. Otalgia, left  H92.02    2. Benign essential hypertension  I10    3. Infective otitis externa, left  H60.392 neomycin-polymyxin-hydrocortisone (CORTISPORIN) [id number] otic suspension     Cont BP meds and recheck  Fluids/Rest, f/u if worse/not any better    "

## 2022-04-21 ENCOUNTER — VIRTUAL VISIT (OUTPATIENT)
Dept: INTERNAL MEDICINE | Facility: CLINIC | Age: 52
End: 2022-04-21
Payer: COMMERCIAL

## 2022-04-21 VITALS — HEART RATE: 86 BPM | DIASTOLIC BLOOD PRESSURE: 58 MMHG | SYSTOLIC BLOOD PRESSURE: 110 MMHG

## 2022-04-21 DIAGNOSIS — E78.1 PURE HYPERGLYCERIDEMIA: ICD-10-CM

## 2022-04-21 DIAGNOSIS — Z12.11 SCREEN FOR COLON CANCER: Primary | ICD-10-CM

## 2022-04-21 DIAGNOSIS — E66.01 MORBID OBESITY (H): ICD-10-CM

## 2022-04-21 DIAGNOSIS — R73.09 ELEVATED HEMOGLOBIN A1C: ICD-10-CM

## 2022-04-21 DIAGNOSIS — Z12.31 ENCOUNTER FOR SCREENING MAMMOGRAM FOR BREAST CANCER: ICD-10-CM

## 2022-04-21 DIAGNOSIS — L30.9 DERMATITIS: ICD-10-CM

## 2022-04-21 DIAGNOSIS — Z13.6 CARDIOVASCULAR SCREENING; LDL GOAL LESS THAN 130: ICD-10-CM

## 2022-04-21 DIAGNOSIS — I10 BENIGN ESSENTIAL HYPERTENSION: ICD-10-CM

## 2022-04-21 PROCEDURE — 99213 OFFICE O/P EST LOW 20 MIN: CPT | Mod: 95 | Performed by: NURSE PRACTITIONER

## 2022-04-21 RX ORDER — LISINOPRIL 10 MG/1
10 TABLET ORAL DAILY
Qty: 30 TABLET | Refills: 11 | Status: SHIPPED | OUTPATIENT
Start: 2022-04-21 | End: 2023-04-11

## 2022-04-21 RX ORDER — TRIAMCINOLONE ACETONIDE 1 MG/G
CREAM TOPICAL 2 TIMES DAILY
Qty: 453 G | Refills: 4 | Status: SHIPPED | OUTPATIENT
Start: 2022-04-21 | End: 2023-08-22 | Stop reason: DRUGHIGH

## 2022-04-21 RX ORDER — DILTIAZEM HYDROCHLORIDE 360 MG/1
CAPSULE, EXTENDED RELEASE ORAL
Qty: 30 CAPSULE | Refills: 11 | Status: SHIPPED | OUTPATIENT
Start: 2022-04-21 | End: 2023-05-18

## 2022-04-21 NOTE — PATIENT INSTRUCTIONS
Lab fasting   You need to be fasting for 12hrs. You can have water and any meds you are on. But, no food, coffee, tea or diet pop.    Check on cologuard     To schedule your mammogram, you may call the breast center at [phone].

## 2022-04-21 NOTE — PROGRESS NOTES
Amalia is a 52 year old who is being evaluated via a billable telephone visit.      What phone number would you like to be contacted at? [phone]  How would you like to obtain your AVS? ShannanWideman    Assessment & Plan     Screen for colon cancer  She has cologuard at home and will contact the company if she still can do this     Morbid obesity (H)  Discussed   - Lipid panel reflex to direct LDL Fasting; Future  - Comprehensive metabolic panel (BMP + Alb, Alk Phos, ALT, AST, Total. Bili, TP); Future    Encounter for screening mammogram for breast cancer    - *MA Screening Digital Bilateral; Future    Pure hyperglyceridemia    - Lipid panel reflex to direct LDL Fasting; Future  - Comprehensive metabolic panel (BMP + Alb, Alk Phos, ALT, AST, Total. Bili, TP); Future    Benign essential hypertension  In good range with medication   - Lipid panel reflex to direct LDL Fasting; Future  - Comprehensive metabolic panel (BMP + Alb, Alk Phos, ALT, AST, Total. Bili, TP); Future  - TSH with free T4 reflex; Future  - CBC with platelets and differential; Future  - Albumin Random Urine Quantitative with Creat Ratio; Future  - lisinopril (ZESTRIL) 10 MG tablet; Take 1 tablet (10 mg) by mouth daily ### DO NOT FILL NOW.  Please update patient's profile to reflect additional refills.  ####  - diltiazem ER COATED BEADS (CARDIZEM CD) 360 MG 24 hr capsule; TAKE 1 CAPSULE BY MOUTH EVERY DAY ### DO NOT FILL NOW.  Please update patient's profile to reflect additional refills.  ####    CARDIOVASCULAR SCREENING; LDL GOAL LESS THAN 130    - Lipid panel reflex to direct LDL Fasting; Future  - Comprehensive metabolic panel (BMP + Alb, Alk Phos, ALT, AST, Total. Bili, TP); Future    Elevated hemoglobin A1c    - HEMOGLOBIN A1C; Future    Dermatitis  Uses as needed   - triamcinolone (KENALOG) 0.1 % external cream; Apply topically 2 times daily ### DO NOT FILL NOW.  Please update patient's profile to reflect additional refills.  ####      10  "minutes spent on the date of the encounter doing chart review, history and exam, documentation and further activities per the note       BMI:   Estimated body mass index is 35.82 kg/m  as calculated from the following:    Height as of 5/25/21: 1.588 m (5' 2.5\").    Weight as of 5/25/21: 90.3 kg (199 lb).       Patient Instructions   Lab fasting   You need to be fasting for 12hrs. You can have water and any meds you are on. But, no food, coffee, tea or diet pop.    Check on cologuard     To schedule your mammogram, you may call the breast center at [phone].           Return in about 1 year (around 4/21/2023).    WADE Dillon CNP [facility] is a 52 year old who presents for the following health issues     HPI     Hypertension Follow-up      Do you check your blood pressure regularly outside of the clinic? Yes     Are you following a low salt diet? No    Are your blood pressures ever more than 140 on the top number (systolic) OR more   than 90 on the bottom number (diastolic), for example 140/90? Yes      How many servings of fruits and vegetables do you eat daily?  2-3    On average, how many sweetened beverages do you drink each day (Examples: soda, juice, sweet tea, etc.  Do NOT count diet or artificially sweetened beverages)?   2    How many days per week do you exercise enough to make your heart beat faster? 7    How many minutes a day do you exercise enough to make your heart beat faster? 20 - 29    How many days per week do you miss taking your medication? 0    110/58 this am   Pulse 86    Needs refills on medication     Will do fasting lab     cologuard not done yet   She is going to call and see if she can still do the test        Review of Systems   Constitutional, HEENT, cardiovascular, pulmonary, GI, , musculoskeletal, neuro, skin, endocrine and psych systems are negative, except as otherwise noted.      Objective           Vitals:  No " vitals were obtained today due to virtual visit.    Physical Exam   alert and no distress  PSYCH: Alert and oriented times 3; coherent speech, normal   rate and volume, able to articulate logical thoughts, able   to abstract reason, no tangential thoughts, no hallucinations   or delusions  Her affect is normal  RESP: No cough, no audible wheezing, able to talk in full sentences  Remainder of exam unable to be completed due to telephone visits    Lab   cologuard             Phone call duration: 10 minutes

## 2022-04-28 ENCOUNTER — LAB REQUISITION (OUTPATIENT)
Dept: LAB | Facility: CLINIC | Age: 52
End: 2022-04-28

## 2022-04-28 PROCEDURE — U0003 INFECTIOUS AGENT DETECTION BY NUCLEIC ACID (DNA OR RNA); SEVERE ACUTE RESPIRATORY SYNDROME CORONAVIRUS 2 (SARS-COV-2) (CORONAVIRUS DISEASE [COVID-19]), AMPLIFIED PROBE TECHNIQUE, MAKING USE OF HIGH THROUGHPUT TECHNOLOGIES AS DESCRIBED BY CMS-2020-01-R: HCPCS | Performed by: INTERNAL MEDICINE

## 2022-04-29 LAB — SARS-COV-2 RNA RESP QL NAA+PROBE: NEGATIVE

## 2022-05-02 ENCOUNTER — LAB REQUISITION (OUTPATIENT)
Dept: LAB | Facility: CLINIC | Age: 52
End: 2022-05-02

## 2022-05-02 PROCEDURE — U0005 INFEC AGEN DETEC AMPLI PROBE: HCPCS | Performed by: INTERNAL MEDICINE

## 2022-05-03 LAB — SARS-COV-2 RNA RESP QL NAA+PROBE: NEGATIVE

## 2022-05-06 ENCOUNTER — LAB (OUTPATIENT)
Dept: LAB | Facility: CLINIC | Age: 52
End: 2022-05-06
Payer: COMMERCIAL

## 2022-05-06 DIAGNOSIS — R73.09 ELEVATED HEMOGLOBIN A1C: ICD-10-CM

## 2022-05-06 DIAGNOSIS — E66.01 MORBID OBESITY (H): ICD-10-CM

## 2022-05-06 DIAGNOSIS — E78.1 PURE HYPERGLYCERIDEMIA: ICD-10-CM

## 2022-05-06 DIAGNOSIS — Z13.6 CARDIOVASCULAR SCREENING; LDL GOAL LESS THAN 130: ICD-10-CM

## 2022-05-06 DIAGNOSIS — I10 BENIGN ESSENTIAL HYPERTENSION: ICD-10-CM

## 2022-05-06 LAB
ALBUMIN SERPL-MCNC: 3.7 G/DL (ref 3.4–5)
ALP SERPL-CCNC: 51 U/L (ref 40–150)
ALT SERPL W P-5'-P-CCNC: 45 U/L (ref 0–50)
ANION GAP SERPL CALCULATED.3IONS-SCNC: 10 MMOL/L (ref 3–14)
AST SERPL W P-5'-P-CCNC: 25 U/L (ref 0–45)
BASOPHILS # BLD AUTO: 0 10E3/UL (ref 0–0.2)
BASOPHILS NFR BLD AUTO: 1 %
BILIRUB SERPL-MCNC: 0.3 MG/DL (ref 0.2–1.3)
BUN SERPL-MCNC: 14 MG/DL (ref 7–30)
CALCIUM SERPL-MCNC: 10.1 MG/DL (ref 8.5–10.1)
CHLORIDE BLD-SCNC: 104 MMOL/L (ref 94–109)
CHOLEST SERPL-MCNC: 235 MG/DL
CO2 SERPL-SCNC: 23 MMOL/L (ref 20–32)
CREAT SERPL-MCNC: 0.25 MG/DL (ref 0.52–1.04)
EOSINOPHIL # BLD AUTO: 0.3 10E3/UL (ref 0–0.7)
EOSINOPHIL NFR BLD AUTO: 4 %
ERYTHROCYTE [DISTWIDTH] IN BLOOD BY AUTOMATED COUNT: 12.3 % (ref 10–15)
FASTING STATUS PATIENT QL REPORTED: YES
GFR SERPL CREATININE-BSD FRML MDRD: >90 ML/MIN/1.73M2
GLUCOSE BLD-MCNC: 131 MG/DL (ref 70–99)
HBA1C MFR BLD: 6.2 % (ref 0–5.6)
HCT VFR BLD AUTO: 41.4 % (ref 35–47)
HDLC SERPL-MCNC: 52 MG/DL
HGB BLD-MCNC: 14 G/DL (ref 11.7–15.7)
IMM GRANULOCYTES # BLD: 0 10E3/UL
IMM GRANULOCYTES NFR BLD: 0 %
LDLC SERPL CALC-MCNC: 147 MG/DL
LYMPHOCYTES # BLD AUTO: 1.3 10E3/UL (ref 0.8–5.3)
LYMPHOCYTES NFR BLD AUTO: 20 %
MCH RBC QN AUTO: 30.6 PG (ref 26.5–33)
MCHC RBC AUTO-ENTMCNC: 33.8 G/DL (ref 31.5–36.5)
MCV RBC AUTO: 90 FL (ref 78–100)
MONOCYTES # BLD AUTO: 0.5 10E3/UL (ref 0–1.3)
MONOCYTES NFR BLD AUTO: 9 %
NEUTROPHILS # BLD AUTO: 4.1 10E3/UL (ref 1.6–8.3)
NEUTROPHILS NFR BLD AUTO: 66 %
NONHDLC SERPL-MCNC: 183 MG/DL
NRBC # BLD AUTO: 0 10E3/UL
NRBC BLD AUTO-RTO: 0 /100
PLATELET # BLD AUTO: 265 10E3/UL (ref 150–450)
POTASSIUM BLD-SCNC: 4.4 MMOL/L (ref 3.4–5.3)
PROT SERPL-MCNC: 7.2 G/DL (ref 6.8–8.8)
RBC # BLD AUTO: 4.58 10E6/UL (ref 3.8–5.2)
SODIUM SERPL-SCNC: 137 MMOL/L (ref 133–144)
TRIGL SERPL-MCNC: 181 MG/DL
TSH SERPL DL<=0.005 MIU/L-ACNC: 1.73 MU/L (ref 0.4–4)
WBC # BLD AUTO: 6.3 10E3/UL (ref 4–11)

## 2022-05-06 PROCEDURE — 80053 COMPREHEN METABOLIC PANEL: CPT

## 2022-05-06 PROCEDURE — 36415 COLL VENOUS BLD VENIPUNCTURE: CPT

## 2022-05-06 PROCEDURE — 85025 COMPLETE CBC W/AUTO DIFF WBC: CPT

## 2022-05-06 PROCEDURE — 84443 ASSAY THYROID STIM HORMONE: CPT

## 2022-05-06 PROCEDURE — 80061 LIPID PANEL: CPT

## 2022-05-06 PROCEDURE — 83036 HEMOGLOBIN GLYCOSYLATED A1C: CPT

## 2022-05-25 NOTE — TELEPHONE ENCOUNTER
Given her current suspected diverticulitis and recent episode approximately 6 months to 1 year ago, Dr. Cardenas may wish to perform colonoscopy sooner.  Will need to wait 6-8 weeks after this episode before scheduling Left voice message for patient that Aimna is still looking in to this question.

## 2022-06-16 ENCOUNTER — LAB REQUISITION (OUTPATIENT)
Dept: LAB | Facility: CLINIC | Age: 52
End: 2022-06-16

## 2022-06-16 PROCEDURE — U0003 INFECTIOUS AGENT DETECTION BY NUCLEIC ACID (DNA OR RNA); SEVERE ACUTE RESPIRATORY SYNDROME CORONAVIRUS 2 (SARS-COV-2) (CORONAVIRUS DISEASE [COVID-19]), AMPLIFIED PROBE TECHNIQUE, MAKING USE OF HIGH THROUGHPUT TECHNOLOGIES AS DESCRIBED BY CMS-2020-01-R: HCPCS | Performed by: INTERNAL MEDICINE

## 2022-06-17 LAB — SARS-COV-2 RNA RESP QL NAA+PROBE: NEGATIVE

## 2022-06-21 ENCOUNTER — HOSPITAL ENCOUNTER (OUTPATIENT)
Dept: MAMMOGRAPHY | Facility: CLINIC | Age: 52
Discharge: HOME | End: 2022-06-21
Attending: NURSE PRACTITIONER | Admitting: NURSE PRACTITIONER
Payer: COMMERCIAL

## 2022-06-21 DIAGNOSIS — Z12.31 ENCOUNTER FOR SCREENING MAMMOGRAM FOR BREAST CANCER: ICD-10-CM

## 2022-06-21 PROCEDURE — 77067 SCR MAMMO BI INCL CAD: CPT

## 2022-07-18 ENCOUNTER — LAB REQUISITION (OUTPATIENT)
Dept: LAB | Facility: CLINIC | Age: 52
End: 2022-07-18

## 2022-07-18 PROCEDURE — U0005 INFEC AGEN DETEC AMPLI PROBE: HCPCS | Performed by: INTERNAL MEDICINE

## 2022-07-19 LAB — SARS-COV-2 RNA RESP QL NAA+PROBE: NEGATIVE

## 2022-07-22 ENCOUNTER — LAB REQUISITION (OUTPATIENT)
Dept: LAB | Facility: CLINIC | Age: 52
End: 2022-07-22

## 2022-07-22 PROCEDURE — U0003 INFECTIOUS AGENT DETECTION BY NUCLEIC ACID (DNA OR RNA); SEVERE ACUTE RESPIRATORY SYNDROME CORONAVIRUS 2 (SARS-COV-2) (CORONAVIRUS DISEASE [COVID-19]), AMPLIFIED PROBE TECHNIQUE, MAKING USE OF HIGH THROUGHPUT TECHNOLOGIES AS DESCRIBED BY CMS-2020-01-R: HCPCS | Performed by: INTERNAL MEDICINE

## 2022-07-23 LAB — SARS-COV-2 RNA RESP QL NAA+PROBE: NEGATIVE

## 2022-07-25 ENCOUNTER — LAB REQUISITION (OUTPATIENT)
Dept: LAB | Facility: CLINIC | Age: 52
End: 2022-07-25

## 2022-07-25 PROCEDURE — U0003 INFECTIOUS AGENT DETECTION BY NUCLEIC ACID (DNA OR RNA); SEVERE ACUTE RESPIRATORY SYNDROME CORONAVIRUS 2 (SARS-COV-2) (CORONAVIRUS DISEASE [COVID-19]), AMPLIFIED PROBE TECHNIQUE, MAKING USE OF HIGH THROUGHPUT TECHNOLOGIES AS DESCRIBED BY CMS-2020-01-R: HCPCS | Performed by: INTERNAL MEDICINE

## 2022-07-26 LAB — SARS-COV-2 RNA RESP QL NAA+PROBE: NEGATIVE

## 2022-07-28 ENCOUNTER — LAB REQUISITION (OUTPATIENT)
Dept: LAB | Facility: CLINIC | Age: 52
End: 2022-07-28

## 2022-07-28 PROCEDURE — U0005 INFEC AGEN DETEC AMPLI PROBE: HCPCS | Performed by: INTERNAL MEDICINE

## 2022-07-29 LAB — SARS-COV-2 RNA RESP QL NAA+PROBE: NEGATIVE

## 2022-08-02 ENCOUNTER — LAB REQUISITION (OUTPATIENT)
Dept: LAB | Facility: CLINIC | Age: 52
End: 2022-08-02

## 2022-08-02 PROCEDURE — U0005 INFEC AGEN DETEC AMPLI PROBE: HCPCS | Performed by: INTERNAL MEDICINE

## 2022-08-03 LAB — SARS-COV-2 RNA RESP QL NAA+PROBE: NEGATIVE

## 2022-08-06 ENCOUNTER — HEALTH MAINTENANCE LETTER (OUTPATIENT)
Age: 52
End: 2022-08-06

## 2022-10-13 ENCOUNTER — OFFICE VISIT (OUTPATIENT)
Dept: URGENT CARE | Facility: URGENT CARE | Age: 52
End: 2022-10-13
Payer: COMMERCIAL

## 2022-10-13 VITALS
TEMPERATURE: 98.2 F | OXYGEN SATURATION: 99 % | HEART RATE: 90 BPM | SYSTOLIC BLOOD PRESSURE: 140 MMHG | DIASTOLIC BLOOD PRESSURE: 70 MMHG | RESPIRATION RATE: 16 BRPM

## 2022-10-13 DIAGNOSIS — L73.9 FOLLICULITIS: Primary | ICD-10-CM

## 2022-10-13 PROCEDURE — 99213 OFFICE O/P EST LOW 20 MIN: CPT | Performed by: PHYSICIAN ASSISTANT

## 2022-10-13 RX ORDER — TRIAMCINOLONE ACETONIDE 5 MG/G
CREAM TOPICAL 2 TIMES DAILY
Qty: 454 G | Refills: 0 | Status: SHIPPED | OUTPATIENT
Start: 2022-10-13 | End: 2023-08-22

## 2022-10-13 NOTE — PROGRESS NOTES
Folliculitis  - triamcinolone (ARISTOCORT HP) 0.5 % external cream; Apply topically 2 times daily  - diphenhydrAMINE (BENADRYL) 2 % external cream; Apply topically 3 times daily as needed for itching  - Adult Dermatology Referral; Future       Folliculitis  Folliculitis is an infection of a hair follicle. A hair follicle is the little pocket where a hair grows out of the skin. Bacteria normally live on the skin. But sometimes bacteria can get trapped in a follicle and cause infection. This causes a bumpy rash. The area over the follicles is red and raised. It may itch or be painful. The bumps may have fluid (pus) inside. The pus may leak and then form crusts. Sores can spread to other areas of the body. Once it goes away, folliculitis can come back at any time. Severe cases may cause lasting (permanent) hair loss and scarring.   Folliculitis can happen anywhere on the body where hair grows. It can be caused by rubbing from tight clothing. Ingrown hairs can cause it. Soaking in a hot tub or swimming pool that has bacteria in the water can cause it. It may also occur if a hair follicle is blocked by a bandage.   Sores often go away in a few days with no treatment. In some cases, medicine may be given. A small piece of skin or pus may be taken to find the type of bacteria causing the infection.   Home care  The healthcare provider may prescribe an antibiotic cream or ointment. Antibiotics taken by mouth (oral) may also be prescribed. Or you may be told to use an over-the-counter antibiotic cream. Follow all instructions when using any of these medicines.   General care    Apply warm, moist compresses to the sores for 20 minutes up to 3 times a day. You can make a compress by soaking a cloth in warm water. Squeeze out excess water.    Don t cut, poke, or squeeze the sores. This can be painful and spread infection.    Don t scratch the affected area. Scratching can delay healing.    Don t shave the areas affected by  folliculitis.    If the sores leak fluid, cover the area with a nonstick gauze bandage. Use as little tape as possible. Carefully get rid of all soiled bandages.    Dress in loose cotton clothing.    Change sheets and blankets if they are soiled by pus. Wash all clothes, towels, sheets, and cloth diapers in soap and hot water. Don't share clothes, towels, or sheets with other family members.    Don't soak the sores in bath water. This can spread infection. Instead keep the area clean by gently washing sores with soap and warm water.    Wash your hands or use antibacterial gels often to prevent spreading the bacteria.     Follow-up care  Follow up with your healthcare provider, or as advised.  When to get medical advice  Call your healthcare provider right away if any of these occur:    Fever of 100.4 F (38 C) or higher, or as directed by your provider    Rash spreads    Rash does not get better with treatment    Redness or swelling that gets worse    Rash becomes more painful    Foul-smelling fluid leaking from the skin    Rash improves, but then comes back   [employer] last reviewed this educational content on 8/1/2019 2000-2021 [employer]. All rights reserved. This information is not intended as a substitute for professional medical care. Always follow your healthcare professional's instructions.                 Johnny Stone PA-C  [facility]    Subjective   52 year old who presents to clinic today for the following health issues:    Rash       HPI     Rash  Onset/Duration: Patient has had this rash off and on again for many years but it has worsened in recent weeks.   Description  Location: Lower legs and arms   Character: blotchy, raised, red  Itching: moderate  Intensity:  moderate  Progression of Symptoms:  worsening  Accompanying signs and symptoms:   Fever: No  Body aches or joint pain: No  Sore throat symptoms: No  Recent cold symptoms: No  History:           Previous  episodes of similar rash: Yes  New exposures:  None  Recent travel: No  Exposure to similar rash: No  Precipitating or alleviating factors: Patient shaves daily and states that she does not always have a clean razor.   Therapies tried and outcome: topical steroid - Low dose triamcinolone       Review of Systems   Review of Systems   See HPI    Objective    Temp: 98.2  F (36.8  C) Temp src: Tympanic BP: (!) 140/70 Pulse: 90   Resp: 16 SpO2: 99 %       Physical Exam   Physical Exam  Constitutional:       General: She is not in acute distress.     Appearance: Normal appearance. She is normal weight. She is not ill-appearing, toxic-appearing or diaphoretic.   HENT:      Head: Normocephalic and atraumatic.   Cardiovascular:      Rate and Rhythm: Normal rate.      Pulses: Normal pulses.   Pulmonary:      Effort: Pulmonary effort is normal. No respiratory distress.   Skin:     Findings: Rash present. Rash is crusting, macular and papular. Rash is not nodular, purpuric, pustular, scaling, urticarial or vesicular.          Neurological:      General: No focal deficit present.      Mental Status: She is alert and oriented to person, place, and time. Mental status is at baseline.      Gait: Gait normal.   Psychiatric:         Mood and Affect: Mood normal.         Behavior: Behavior normal.         Thought Content: Thought content normal.         Judgment: Judgment normal.          No results found for this or any previous visit (from the past 24 hour(s)).

## 2022-10-22 ENCOUNTER — HEALTH MAINTENANCE LETTER (OUTPATIENT)
Age: 52
End: 2022-10-22

## 2023-04-01 ENCOUNTER — HEALTH MAINTENANCE LETTER (OUTPATIENT)
Age: 53
End: 2023-04-01

## 2023-04-10 DIAGNOSIS — I10 BENIGN ESSENTIAL HYPERTENSION: ICD-10-CM

## 2023-04-11 RX ORDER — LISINOPRIL 10 MG/1
TABLET ORAL
Qty: 30 TABLET | Refills: 11 | Status: SHIPPED | OUTPATIENT
Start: 2023-04-11 | End: 2023-08-22

## 2023-04-11 NOTE — TELEPHONE ENCOUNTER
Pending Prescriptions:                       Disp   Refills    lisinopril (ZESTRIL) 10 MG tablet [Pharmac*30 tab*11       Sig: TAKE 1 TABLET BY MOUTH EVERY DAY    Routing refill request to provider for review/approval because:  BP Readings from Last 3 Encounters:   10/13/22 (!) 140/70   04/21/22 110/58   03/30/22 (!) 140/78

## 2023-04-14 NOTE — TELEPHONE ENCOUNTER
Patient called back and message given. Really wants to try this medication. Would rather not see [facility]. Please call when Amina Olivares has got information form pharmacist.    Please return to the emergency department for suture removal in 7 to 10 days.    Laceration    A laceration is a cut that goes through all of the layers of the skin and into the tissue that is right under the skin. Some lacerations heal on their own. Others need to be closed with skin adhesive strips, skin glue, stitches (sutures), or staples. Proper laceration care minimizes the risk of infection and helps the laceration to heal better.  If non-absorbable stitches or staples have been placed, they must be taken out within the time frame instructed by your healthcare provider.    SEEK IMMEDIATE MEDICAL CARE IF YOU HAVE ANY OF THE FOLLOWING SYMPTOMS: swelling around the wound, worsening pain, drainage from the wound, red streaking going away from your wound, inability to move finger or toe near the laceration, or discoloration of skin near the laceration.

## 2023-05-23 ENCOUNTER — PATIENT OUTREACH (OUTPATIENT)
Dept: CARE COORDINATION | Facility: CLINIC | Age: 53
End: 2023-05-23
Payer: COMMERCIAL

## 2023-06-19 ENCOUNTER — TELEPHONE (OUTPATIENT)
Dept: INTERNAL MEDICINE | Facility: CLINIC | Age: 53
End: 2023-06-19
Payer: COMMERCIAL

## 2023-06-19 DIAGNOSIS — Z12.31 VISIT FOR SCREENING MAMMOGRAM: Primary | ICD-10-CM

## 2023-06-19 NOTE — TELEPHONE ENCOUNTER
Called and spoke to patient regarding wanting an updated mammogram order for a screening mammogram. Patient would like 3D, same as last time    Patient states that she called to schedule a mammogram, but was transferred and told to talk to a nurse.    Per patient, she is having mild discomfort in her rib area under her breast area, but no pain in her actual breast issue.    Mammogram order T'd up, please advise and sign if appropriate.    Ignacio Liao, Triage RN [place]  3:32 PM 6/19/2023

## 2023-06-20 ENCOUNTER — PATIENT OUTREACH (OUTPATIENT)
Dept: CARE COORDINATION | Facility: CLINIC | Age: 53
End: 2023-06-20
Payer: COMMERCIAL

## 2023-06-27 ENCOUNTER — HOSPITAL ENCOUNTER (OUTPATIENT)
Dept: MAMMOGRAPHY | Facility: CLINIC | Age: 53
Discharge: HOME | End: 2023-06-27
Attending: INTERNAL MEDICINE | Admitting: INTERNAL MEDICINE
Payer: COMMERCIAL

## 2023-06-27 DIAGNOSIS — Z12.31 VISIT FOR SCREENING MAMMOGRAM: ICD-10-CM

## 2023-06-27 PROCEDURE — 77067 SCR MAMMO BI INCL CAD: CPT

## 2023-07-20 ENCOUNTER — OFFICE VISIT (OUTPATIENT)
Dept: URGENT CARE | Facility: URGENT CARE | Age: 53
End: 2023-07-20
Payer: COMMERCIAL

## 2023-07-20 VITALS
TEMPERATURE: 97.3 F | HEART RATE: 80 BPM | SYSTOLIC BLOOD PRESSURE: 133 MMHG | BODY MASS INDEX: 36 KG/M2 | OXYGEN SATURATION: 97 % | DIASTOLIC BLOOD PRESSURE: 86 MMHG | WEIGHT: 200 LBS

## 2023-07-20 DIAGNOSIS — H61.21 IMPACTED CERUMEN OF RIGHT EAR: Primary | ICD-10-CM

## 2023-07-20 PROCEDURE — 69209 REMOVE IMPACTED EAR WAX UNI: CPT | Mod: RT | Performed by: PHYSICIAN ASSISTANT

## 2023-07-20 NOTE — PROGRESS NOTES
"URGENT CARE VISIT:    SUBJECTIVE:   Amalia Newman is a 53 year old female presenting with a chief complaint of right  decreased hearing after water went in to left ear.  Onset was today.   She denies the following symptoms: ear pain, fever, chills and stuffy nose  Course of illness is same.    Treatment measures tried include none with no relief of symptoms.  Predisposing factors include none.    PMH:   Past Medical History:   Diagnosis Date     E. coli sepsis (H) 6/1/2019     Essential hypertension, benign 1996     Infection due to ESBL-producing Escherichia coli 6/1/2019     Pure hyperglyceridemia      Tachycardia      Allergies: Bactrim [sulfamethoxazole-trimethoprim] and Vicodin [hydrocodone-acetaminophen]   Medications:   Current Outpatient Medications   Medication Sig Dispense Refill     ascorbic acid (VITAMIN C) 500 MG tablet Take 2 tablets (1,000 mg) by mouth daily 30 tablet      diltiazem ER COATED BEADS (CARDIZEM CD) 360 MG 24 hr capsule TAKE 1 CAPSULE BY MOUTH EVERY DAY 30 capsule 1     diphenhydrAMINE (BENADRYL) 2 % external cream Apply topically 3 times daily as needed for itching 120 g 0     ibuprofen (ADVIL/MOTRIN) 200 MG tablet Take 200 mg by mouth every 4 hours as needed for mild pain       lisinopril (ZESTRIL) 10 MG tablet TAKE 1 TABLET BY MOUTH EVERY DAY 30 tablet 11     triamcinolone (ARISTOCORT HP) 0.5 % external cream Apply topically 2 times daily 454 g 0     triamcinolone (KENALOG) 0.1 % external cream Apply topically 2 times daily ### DO NOT FILL NOW.  Please update patient's profile to reflect additional refills.  #### 453 g 4     Social History:   Social History     Tobacco Use     Smoking status: Never     Smokeless tobacco: Never   Substance Use Topics     Alcohol use: Yes     Comment: \"very rare\"       ROS:  Review of systems negative except as stated above.    OBJECTIVE:  /86 (BP Location: Left arm, Patient Position: Sitting, Cuff Size: Adult Regular)   Pulse 80   Temp 97.3 "  F (36.3  C) (Tympanic)   Wt 90.7 kg (200 lb)   LMP 12/06/2005 (LMP Unknown)   SpO2 97%   BMI 36.00 kg/m    GENERAL APPEARANCE: healthy, alert and no distress  EYES: conjunctiva clear  HENT: TM's normal.  Copious cerumen present in right external ear canal.   NECK: supple, nontender, no lymphadenopathy  RESP: lungs clear to auscultation - no rales, rhonchi or wheezes  CV: regular rates and rhythm, normal S1 S2, no murmur noted  SKIN: no suspicious lesions or rashes      ASSESSMENT:    ICD-10-CM    1. Impacted cerumen of right ear  H61.21 PA REMOVAL IMPACTED CERUMEN IRRIGATION/LVG UNILAT          PROCEDURE: Ear irrigation  Ear wax was successfully removed with irrigation. Patient tolerated the procedure well.     PLAN:  Patient Instructions   Patient was educated on the natural  course of the condition. Conservative measures to prevent ear wax build up including applying a few drops of mineral oil or earwax softener (Debrox) were discussed.  Avoid using q tips as this may push ear wax further in to the ear canal.  See your primary care provider if symptoms worsen or do not improve in 7 days. Seek emergency care if you develop severe ear pain or fever over 103.    Patient verbalized understanding and is agreeable to plan. The patient was discharged ambulatory and in stable condition.    Whitney Garnica PA-C on 7/20/2023 at 2:37 PM

## 2023-08-22 ENCOUNTER — OFFICE VISIT (OUTPATIENT)
Dept: INTERNAL MEDICINE | Facility: CLINIC | Age: 53
End: 2023-08-22
Payer: COMMERCIAL

## 2023-08-22 ENCOUNTER — LAB (OUTPATIENT)
Dept: INTERNAL MEDICINE | Facility: CLINIC | Age: 53
End: 2023-08-22

## 2023-08-22 VITALS
WEIGHT: 204 LBS | BODY MASS INDEX: 36.14 KG/M2 | HEIGHT: 63 IN | DIASTOLIC BLOOD PRESSURE: 64 MMHG | HEART RATE: 85 BPM | OXYGEN SATURATION: 97 % | TEMPERATURE: 98.5 F | SYSTOLIC BLOOD PRESSURE: 128 MMHG | RESPIRATION RATE: 18 BRPM

## 2023-08-22 DIAGNOSIS — Z12.11 SPECIAL SCREENING FOR MALIGNANT NEOPLASMS, COLON: ICD-10-CM

## 2023-08-22 DIAGNOSIS — E66.01 MORBID OBESITY (H): ICD-10-CM

## 2023-08-22 DIAGNOSIS — Z00.00 ROUTINE GENERAL MEDICAL EXAMINATION AT A HEALTH CARE FACILITY: Primary | ICD-10-CM

## 2023-08-22 DIAGNOSIS — R73.09 ABNORMAL GLUCOSE: ICD-10-CM

## 2023-08-22 DIAGNOSIS — L73.9 FOLLICULITIS: ICD-10-CM

## 2023-08-22 DIAGNOSIS — I10 BENIGN ESSENTIAL HYPERTENSION: ICD-10-CM

## 2023-08-22 DIAGNOSIS — Z12.11 SCREEN FOR COLON CANCER: ICD-10-CM

## 2023-08-22 LAB
ALBUMIN SERPL BCG-MCNC: 4.5 G/DL (ref 3.5–5.2)
ALP SERPL-CCNC: 45 U/L (ref 35–104)
ALT SERPL W P-5'-P-CCNC: 29 U/L (ref 0–50)
ANION GAP SERPL CALCULATED.3IONS-SCNC: 13 MMOL/L (ref 7–15)
AST SERPL W P-5'-P-CCNC: 29 U/L (ref 0–45)
BASOPHILS # BLD AUTO: 0 10E3/UL (ref 0–0.2)
BASOPHILS NFR BLD AUTO: 0 %
BILIRUB SERPL-MCNC: 0.3 MG/DL
BUN SERPL-MCNC: 14.4 MG/DL (ref 6–20)
CALCIUM SERPL-MCNC: 10.6 MG/DL (ref 8.6–10)
CHLORIDE SERPL-SCNC: 102 MMOL/L (ref 98–107)
CHOLEST SERPL-MCNC: 220 MG/DL
CREAT SERPL-MCNC: 0.35 MG/DL (ref 0.51–0.95)
DEPRECATED HCO3 PLAS-SCNC: 23 MMOL/L (ref 22–29)
EOSINOPHIL # BLD AUTO: 0.2 10E3/UL (ref 0–0.7)
EOSINOPHIL NFR BLD AUTO: 3 %
ERYTHROCYTE [DISTWIDTH] IN BLOOD BY AUTOMATED COUNT: 12.2 % (ref 10–15)
GFR SERPL CREATININE-BSD FRML MDRD: >90 ML/MIN/1.73M2
GLUCOSE SERPL-MCNC: 137 MG/DL (ref 70–99)
HBA1C MFR BLD: 6.5 % (ref 0–5.6)
HCT VFR BLD AUTO: 40.6 % (ref 35–47)
HDLC SERPL-MCNC: 47 MG/DL
HGB BLD-MCNC: 14 G/DL (ref 11.7–15.7)
IMM GRANULOCYTES # BLD: 0 10E3/UL
IMM GRANULOCYTES NFR BLD: 0 %
LDLC SERPL CALC-MCNC: 135 MG/DL
LYMPHOCYTES # BLD AUTO: 1.3 10E3/UL (ref 0.8–5.3)
LYMPHOCYTES NFR BLD AUTO: 19 %
MCH RBC QN AUTO: 30.5 PG (ref 26.5–33)
MCHC RBC AUTO-ENTMCNC: 34.5 G/DL (ref 31.5–36.5)
MCV RBC AUTO: 89 FL (ref 78–100)
MONOCYTES # BLD AUTO: 0.5 10E3/UL (ref 0–1.3)
MONOCYTES NFR BLD AUTO: 8 %
NEUTROPHILS # BLD AUTO: 4.8 10E3/UL (ref 1.6–8.3)
NEUTROPHILS NFR BLD AUTO: 70 %
NONHDLC SERPL-MCNC: 173 MG/DL
PLATELET # BLD AUTO: 250 10E3/UL (ref 150–450)
POTASSIUM SERPL-SCNC: 4.7 MMOL/L (ref 3.4–5.3)
PROT SERPL-MCNC: 7.1 G/DL (ref 6.4–8.3)
RBC # BLD AUTO: 4.59 10E6/UL (ref 3.8–5.2)
SODIUM SERPL-SCNC: 138 MMOL/L (ref 136–145)
TRIGL SERPL-MCNC: 190 MG/DL
TSH SERPL DL<=0.005 MIU/L-ACNC: 1.42 UIU/ML (ref 0.3–4.2)
WBC # BLD AUTO: 6.8 10E3/UL (ref 4–11)

## 2023-08-22 PROCEDURE — 80061 LIPID PANEL: CPT | Performed by: NURSE PRACTITIONER

## 2023-08-22 PROCEDURE — 90636 HEP A/HEP B VACC ADULT IM: CPT | Performed by: NURSE PRACTITIONER

## 2023-08-22 PROCEDURE — 80053 COMPREHEN METABOLIC PANEL: CPT | Performed by: NURSE PRACTITIONER

## 2023-08-22 PROCEDURE — 83036 HEMOGLOBIN GLYCOSYLATED A1C: CPT | Performed by: NURSE PRACTITIONER

## 2023-08-22 PROCEDURE — 84443 ASSAY THYROID STIM HORMONE: CPT | Performed by: NURSE PRACTITIONER

## 2023-08-22 PROCEDURE — 99396 PREV VISIT EST AGE 40-64: CPT | Mod: 25 | Performed by: NURSE PRACTITIONER

## 2023-08-22 PROCEDURE — 36415 COLL VENOUS BLD VENIPUNCTURE: CPT | Performed by: NURSE PRACTITIONER

## 2023-08-22 PROCEDURE — 85025 COMPLETE CBC W/AUTO DIFF WBC: CPT | Performed by: NURSE PRACTITIONER

## 2023-08-22 PROCEDURE — 90471 IMMUNIZATION ADMIN: CPT | Performed by: NURSE PRACTITIONER

## 2023-08-22 PROCEDURE — 99214 OFFICE O/P EST MOD 30 MIN: CPT | Mod: 25 | Performed by: NURSE PRACTITIONER

## 2023-08-22 RX ORDER — LISINOPRIL 10 MG/1
10 TABLET ORAL DAILY
Qty: 90 TABLET | Refills: 3 | Status: SHIPPED | OUTPATIENT
Start: 2023-08-22 | End: 2024-07-22

## 2023-08-22 RX ORDER — DILTIAZEM HYDROCHLORIDE 360 MG/1
360 CAPSULE, EXTENDED RELEASE ORAL DAILY
Qty: 90 CAPSULE | Refills: 3 | Status: SHIPPED | OUTPATIENT
Start: 2023-08-22 | End: 2024-07-29

## 2023-08-22 RX ORDER — TRIAMCINOLONE ACETONIDE 5 MG/G
CREAM TOPICAL 2 TIMES DAILY
Qty: 454 G | Refills: 11 | Status: SHIPPED | OUTPATIENT
Start: 2023-08-22

## 2023-08-22 NOTE — PROGRESS NOTES
"   SUBJECTIVE:   CC: Amalia is an 53 year old who presents for preventive health visit.       8/22/2023     8:01 AM   Additional Questions   Roomed by Adrienne CREWS       Healthy Habits:     Getting at least 3 servings of Calcium per day:  NO    Bi-annual eye exam:  Yes    Dental care twice a year:  NO    Sleep apnea or symptoms of sleep apnea:  None    Diet:  Regular (no restrictions)    Frequency of exercise:  6-7 days/week    Duration of exercise:  30-45 minutes    Taking medications regularly:  Yes    Barriers to taking medications:  Not applicable    Medication side effects:  Other    Additional concerns today:  No                      Social History     Tobacco Use    Smoking status: Never    Smokeless tobacco: Never   Substance Use Topics    Alcohol use: Yes     Comment: \"very rare\"             5/25/2021     7:04 AM   Alcohol Use   Prescreen: >3 drinks/day or >7 drinks/week? No     Reviewed orders with patient.  Reviewed health maintenance and updated orders accordingly - Yes      Breast Cancer Screening:  Any new diagnosis of family breast, ovarian, or bowel cancer?     FHS-7:       6/21/2022     1:13 PM 6/27/2023     1:37 PM   Breast CA Risk Assessment (FHS-7)   Did any of your first-degree relatives have breast or ovarian cancer? No No   Did any of your relatives have bilateral breast cancer? No No   Did any man in your family have breast cancer? No No   Did any woman in your family have breast and ovarian cancer? No No   Did any woman in your family have breast cancer before age 50 y? No No   Do you have 2 or more relatives with breast and/or ovarian cancer? No No   Do you have 2 or more relatives with breast and/or bowel cancer? No No         Pertinent mammograms are reviewed under the imaging tab.    History of abnormal Pap smear:       1/22/2008    12:00 AM 1/9/2007    12:00 AM 12/13/2005    12:00 AM   PAP / HPV   PAP (Historical) NIL  NIL  NIL      Reviewed and updated as needed this visit by clinical " "staff   Tobacco  Allergies  Meds  Problems  Med Hx  Surg Hx  Fam Hx          Reviewed and updated as needed this visit by Provider                     Review of Systems  CONSTITUTIONAL: NEGATIVE for fever, chills, change in weight  INTEGUMENTARU/SKIN: NEGATIVE for worrisome rashes, moles or lesions  EYES: NEGATIVE for vision changes or irritation  ENT: NEGATIVE for ear, mouth and throat problems  RESP: NEGATIVE for significant cough or SOB  BREAST: NEGATIVE for masses, tenderness or discharge  CV: NEGATIVE for chest pain, palpitations or peripheral edema  GI: NEGATIVE for nausea, abdominal pain, heartburn, or change in bowel habits  : NEGATIVE for unusual urinary or vaginal symptoms. Periods are regular.  MUSCULOSKELETAL: NEGATIVE for significant arthralgias or myalgia  NEURO: NEGATIVE for weakness, dizziness or paresthesias  PSYCHIATRIC: NEGATIVE for changes in mood or affect    High side of normal  Glaucoma risk - seeing eye doctor upcoming    Uses triamcinolone as needed for folliculitis in arm and legs from shaving     Blood pressure in good range          OBJECTIVE:   /64   Pulse 85   Temp 98.5  F (36.9  C) (Oral)   Resp 18   Ht 1.6 m (5' 3\")   Wt 92.5 kg (204 lb)   LMP 12/06/2005 (LMP Unknown)   SpO2 97%   BMI 36.14 kg/m    Physical Exam  GENERAL: alert and no distress  EYES: Eyes grossly normal to inspection, and conjunctivae and sclerae normal  HENT: ear canals and TM's normal, nose and mouth without ulcers or lesions  NECK: no adenopathy, no asymmetry, masses, or scars and thyroid normal to palpation  RESP: lungs clear to auscultation - no rales, rhonchi or wheezes  BREAST: normal without masses, tenderness or nipple discharge and no palpable axillary masses or adenopathy  CV: regular rate and rhythm, normal S1 S2, no S3 or S4, no murmur, click or rub, no peripheral edema and peripheral pulses strong  ABDOMEN: soft, nontender, no hepatosplenomegaly, no masses and bowel sounds " normal  MS: no gross musculoskeletal defects noted, no edema  SKIN: no suspicious lesions or rashes  NEURO: Normal strength and tone, mentation intact and speech normal  PSYCH: mentation appears normal, affect normal/bright    Diagnostic Test Results:  Labs reviewed in Epic  Lab     ASSESSMENT/PLAN:   (Z00.00) Routine general medical examination at a health care facility  (primary encounter diagnosis)  Comment:   Plan: Albumin Random Urine Quantitative with Creat         Ratio, COMPREHENSIVE METABOLIC PANEL, TSH WITH         FREE T4 REFLEX, Lipid panel reflex to direct         LDL Fasting, CBC with platelets and         differential            (I10) Benign essential hypertension  Comment: In good range  Plan: Albumin Random Urine Quantitative with Creat         Ratio, COMPREHENSIVE METABOLIC PANEL, TSH WITH         FREE T4 REFLEX, Lipid panel reflex to direct         LDL Fasting, CBC with platelets and         differential, diltiazem ER COATED BEADS         (CARDIZEM CD) 360 MG 24 hr capsule, lisinopril         (ZESTRIL) 10 MG tablet            (E66.01) Morbid obesity (H)  Comment: Discussed weight loss may help her blood pressure  Plan: HEMOGLOBIN A1C            (Z12.11) Screen for colon cancer  Comment:   Plan: She was confused by the directions when she got the Cologuard before, so when she comes in for her immunization she may bring on the box with her so that nursing can discuss with her how to perform that test    (R73.09) Abnormal glucose  Comment:   Plan: HEMOGLOBIN A1C            (Z12.11) Special screening for malignant neoplasms, colon  Comment:   Plan: COLOGUARD([employer])            (L73.9) Folliculitis  Comment: She uses as needed.  She gets some folliculitis from shaving  Plan: triamcinolone (ARISTOCORT HP) 0.5 % external         cream                  COUNSELING:  Reviewed preventive health counseling, as reflected in patient instructions       Regular exercise       Healthy diet/nutrition        Immunizations  Vaccinated for: Hepatitis A and Hepatitis B           Osteoporosis prevention/bone health        She reports that she has never smoked. She has never used smokeless tobacco.          WADE Dillon CNP [facility]

## 2023-08-22 NOTE — NURSING NOTE
"Chief Complaint   Patient presents with    Physical     fasting     initial /64   Pulse 85   Temp 98.5  F (36.9  C) (Oral)   Resp 18   Ht 1.6 m (5' 3\")   Wt 92.5 kg (204 lb)   LMP 12/06/2005 (LMP Unknown)   SpO2 97%   BMI 36.14 kg/m   Estimated body mass index is 36.14 kg/m  as calculated from the following:    Height as of this encounter: 1.6 m (5' 3\").    Weight as of this encounter: 92.5 kg (204 lb)..  bp completed using cuff size large  CARLOS MACKENZIE LPN  "

## 2023-08-22 NOTE — PATIENT INSTRUCTIONS
Lab in [address]    Shingrex listed as given 8/20/2021  Let us know if you have had the second dose

## 2023-08-24 DIAGNOSIS — E78.00 HYPERCHOLESTEREMIA: ICD-10-CM

## 2023-08-24 DIAGNOSIS — E83.52 SERUM CALCIUM ELEVATED: Primary | ICD-10-CM

## 2023-08-24 DIAGNOSIS — E11.69 TYPE 2 DIABETES MELLITUS WITH OTHER SPECIFIED COMPLICATION, WITHOUT LONG-TERM CURRENT USE OF INSULIN (H): ICD-10-CM

## 2023-08-24 DIAGNOSIS — E66.01 MORBID OBESITY (H): Primary | ICD-10-CM

## 2023-08-24 RX ORDER — ROSUVASTATIN CALCIUM 20 MG/1
20 TABLET, COATED ORAL DAILY
Qty: 90 TABLET | Refills: 3 | Status: SHIPPED | OUTPATIENT
Start: 2023-08-24 | End: 2024-07-22

## 2023-08-24 RX ORDER — LANCETS
EACH MISCELLANEOUS
Qty: 100 EACH | Refills: 6 | Status: SHIPPED | OUTPATIENT
Start: 2023-08-24

## 2023-08-25 ENCOUNTER — TELEPHONE (OUTPATIENT)
Dept: INTERNAL MEDICINE | Facility: CLINIC | Age: 53
End: 2023-08-25
Payer: COMMERCIAL

## 2023-08-25 NOTE — TELEPHONE ENCOUNTER
Prior Authorization Retail Medication Request    Medication/Dose: Semaglutide (RYBELSUS) 3 MG tablet  ICD code (if different than what is on RX):    Morbid obesity (H) [E66.01]  - Primary   Type 2 diabetes mellitus with other specified complication, without long-term current use of insulin (H) [E11.69]     Previously Tried and Failed:    Rationale:      Insurance Name:    Insurance ID:        Pharmacy Information (if different than what is on RX)  Name:    Phone:

## 2023-08-25 NOTE — TELEPHONE ENCOUNTER
Left voice message for patient to call back to speak to any nurse regarding her questions.     Amalia Haq RN  [facility]

## 2023-08-25 NOTE — TELEPHONE ENCOUNTER
Patient still has medication questions but also called to say the Rybelsis is covered but requires a PA.     [facility] and it will be forwarded to PA team    Patient call back number [phone]

## 2023-08-25 NOTE — TELEPHONE ENCOUNTER
Patient calls with many questions about her medications. She says there is one new med that is not covered by her insurance.     Patient was advised to call back number her insurance to find out what is covered and to let us know    Patient call back number [phone]

## 2023-08-25 NOTE — TELEPHONE ENCOUNTER
Patient calls to request a PA be done on the Rybelsus.  If this will not be covered Patient is requesting something similar that will assist with weight loss.      Patient is eager to schedule with Diabetic Ed.  They have attempted to reach her once.  Please give Patient the telephone number [phone] to schedule with them.

## 2023-08-29 NOTE — TELEPHONE ENCOUNTER
PRIOR AUTHORIZATION DENIED    Medication: Semaglutide (RYBELSUS) 3 MG tablet    Denial Date: 8/29/2023    Denial Rational:          Appeal Information:    If you would like to appeal, please supply P/A team with a letter of medical necessity with clinical reason.

## 2023-08-29 NOTE — TELEPHONE ENCOUNTER
Attempted to contact patient. Left voice message to call back.     Amalia Haq RN  [facility]

## 2023-08-29 NOTE — TELEPHONE ENCOUNTER
Please see messages below.  Patient would like to start a medication soon.      Patient wanting primary care provider to know she has been walking 30 minutes a day and has been parking at the far end of the parking lots to get more steps in.  It doesn't seem like the PA team has even opened the message yet.

## 2023-08-30 NOTE — TELEPHONE ENCOUNTER
Called and left patient a voice mail message on August 30, 2023 9:02 AM to call back the clinic. Third attempt. Prior authorization was denied and sent to provider to advise on denial.    Thank you,  Ignacio Liao, Triage RN [facility]  9:02 AM 8/30/2023

## 2023-09-01 ENCOUNTER — TELEPHONE (OUTPATIENT)
Dept: INTERNAL MEDICINE | Facility: CLINIC | Age: 53
End: 2023-09-01
Payer: COMMERCIAL

## 2023-09-01 NOTE — TELEPHONE ENCOUNTER
General Call      Reason for Call:  Needs help with instructions for cologuard     What are your questions or concerns:  Needs someone to go over instructions with her     Date of last appointment with provider: N/A    Could we send this information to you in [employer] or would you prefer to receive a phone call?:   Patient would prefer a phone call   Okay to leave a detailed message?: Yes at Cell number on file:    Telephone Information:   Mobile [phone]

## 2023-09-02 NOTE — TELEPHONE ENCOUNTER
See if patient willing to do metformin in place of Rybelsus for now   This also can help with weight loss

## 2023-09-05 NOTE — TELEPHONE ENCOUNTER
Attempted to contact patient. Left voice message to call back.     Amalia Haq RN  [facility]

## 2023-09-05 NOTE — TELEPHONE ENCOUNTER
Any of the weight/diabetic medication require, for her insurance, to do another medication first   Lab Results   Component Value Date    A1C 6.5 08/22/2023    A1C 6.2 05/06/2022    A1C 6.1 05/25/2021    A1C 5.7 06/10/2020    A1C 5.4 05/31/2019    A1C 5.6 03/20/2018    A1C 5.5 02/21/2017     Her A1c is not high and does not require a medication at all, but thought it might go through as for 2 reasons   She  can check with insurance and see if any other medication would be covered and or we can refer to weight clinic and they may be able to support the request better so she could do the medication     Just because others have had issues with metformin, it does not mean she will

## 2023-09-05 NOTE — TELEPHONE ENCOUNTER
Patient returned call. She does not want to try Metformin because she knows too many people including her  who have had adverse effects. She wants to know if there is anything else she can try that will help with weight loss as well. She also is going to see diabetic educator and she has been trying to walk as much as she can as well as cut out some of the high carbohydrate foods.

## 2023-09-06 ENCOUNTER — MYC MEDICAL ADVICE (OUTPATIENT)
Dept: INTERNAL MEDICINE | Facility: CLINIC | Age: 53
End: 2023-09-06
Payer: COMMERCIAL

## 2023-09-06 DIAGNOSIS — Z76.89 ENCOUNTER FOR WEIGHT MANAGEMENT: Primary | ICD-10-CM

## 2023-09-06 DIAGNOSIS — E11.69 TYPE 2 DIABETES MELLITUS WITH OTHER SPECIFIED COMPLICATION, WITHOUT LONG-TERM CURRENT USE OF INSULIN (H): ICD-10-CM

## 2023-09-06 NOTE — TELEPHONE ENCOUNTER
Called and left patient a voice mail message on September 6, 2023 9:56 AM to call back the clinic or advised to check [facility] for website/instructions for Cologuard instructions.    Thank you,  Ignacio Liao, Triage RN [facility]  9:56 AM 9/6/2023

## 2023-09-06 NOTE — TELEPHONE ENCOUNTER
Glipizide can cause weight gain in some people   She does not need to be on a diabetic medication at her A1C, although she can be  The medication were suggested as weight loss and diabetes control medication   But she does not have to start anything for JUST diabetes, unless she wants to as her A1C is less than 7 without medication   Let me know what she wants to do

## 2023-09-06 NOTE — TELEPHONE ENCOUNTER
Patient called back and message relayed to her and patient wondering if she could try taking Glipizide?  Patient stated her dad took this and did well on it.

## 2023-09-07 NOTE — TELEPHONE ENCOUNTER
GT Channel message sent to patient with provider recommendations.     Amalia Haq RN  [facility]

## 2023-09-08 ENCOUNTER — TELEPHONE (OUTPATIENT)
Dept: INTERNAL MEDICINE | Facility: CLINIC | Age: 53
End: 2023-09-08
Payer: COMMERCIAL

## 2023-09-08 NOTE — TELEPHONE ENCOUNTER
Called and spoke with patient to relay message. Patient says she deactivated her MyChart as she doesn't use it and prefers phone calls. Patient says she will call her insurance and update us about what she finds out.    Will wait for patient to follow up.    Thank you,  Ignacio Liao, Triage RN [facility]  1:02 PM 9/8/2023

## 2023-09-08 NOTE — TELEPHONE ENCOUNTER
Relayed message to Patient.  Patient is asking if she can be put on one of the injectable medications for weight loss.  Patient is over weight and walks every day and can not loose weight.  Can she try Wegovy or similar?  Please address and advise.

## 2023-09-08 NOTE — TELEPHONE ENCOUNTER
Prior Authorization Retail Medication Request    Medication/Dose: Semaglutide-Weight Management (WEGOVY) 0.25 MG/0.5ML pen  ICD code (if different than what is on RX):    Encounter for weight management [Z76.89]  - Primary      Type 2 diabetes mellitus with other specified complication, without long-term current use of insulin (H) [E11.69]        Previously Tried and Failed:    Rationale:      Insurance Name:    Insurance ID:        Pharmacy Information (if different than what is on RX)  Name:    Phone:

## 2023-09-08 NOTE — TELEPHONE ENCOUNTER
Patient called back and said that she spoke to Naomi with her insurance and was told that Wegovy would be covered if we do a PRIOR AUTHORIZATION. The help desk for insurance is [phone]. Amina will need to prescribe the Wegovy and send it to her pharmacy and then we can start the PRIOR AUTHORIZATION process.

## 2023-09-08 NOTE — TELEPHONE ENCOUNTER
Called and left patient a voice mail message on September 8, 2023 9:45 AM to call back the clinic.    Thank you,  Ignacio Liao, Triage RN [facility]  9:45 AM 9/8/2023

## 2023-09-08 NOTE — TELEPHONE ENCOUNTER
Called patient and informed her of provider recommendations. Patient verbalizes understanding.     Amalia Haq RN  [facility]

## 2023-09-08 NOTE — TELEPHONE ENCOUNTER
She should check with insurance and see if it would be covered, and then we can try   But as rybelsus which is oral form was not allowed, I am not sure that wegovy would be either   If she calls and says it will be covered, we could try wegovy

## 2023-09-08 NOTE — TELEPHONE ENCOUNTER
Would do one dose for a month and in 3 weeks, she should call and say she is doing ok, and we will send in next dose for the next month   She should have visits for weight and blood pressure monthly- can be nurse only     Prescription sent

## 2023-09-12 NOTE — TELEPHONE ENCOUNTER
She can call insurance and see what they want   We have tried twice for different medication and they have denied it   She does not need to be on diabetic medication at this point as A1C is less than 7   If she wants to do weight loss medication, the clinic may be able to get it for her faster then we can

## 2023-09-12 NOTE — TELEPHONE ENCOUNTER
"Called and spoke to patient to relay message. Patient states she doesn't want to try Metformin because \"it's too big to swallow and I want a better medication\". Patient doesn't want referral to weight loss clinic. Patient wants to know if we can try the prior authorization again or see if Amina could suggest something else.    Thank you,  Ignacio Liao, Triage RN [facility]  4:18 PM 9/12/2023     "

## 2023-09-12 NOTE — TELEPHONE ENCOUNTER
It appears a prior authorization was initiated and denied at the clinic level and due to documentation limitations the central prior authorization team will not be able to do the appeal.

## 2023-09-12 NOTE — TELEPHONE ENCOUNTER
Let patient know it was not approved   If she wants to try metformin to see of helpful for weight loss and diabetes we can do this   Or if she wants to go to weight clinic we can refer

## 2023-09-13 NOTE — TELEPHONE ENCOUNTER
Called and spoke with patient to relay provider message. Patient denies referral for weight loss clinic. Patient states she will try to contact her insurance again.    Thank you,  Ignacio Liao, Triage RN [facility]  8:03 AM 9/13/2023

## 2023-09-14 NOTE — TELEPHONE ENCOUNTER
I am not willing to call for this   She can go to weight management if she wants   I do not know what would allow her to have it if I call  I do not feel it is necessary and other options have been offered and declined   We will just watch her A1c and she can work on diet and exercise for diabetes and weight loss  She has referral to  diabetic ed

## 2023-09-14 NOTE — TELEPHONE ENCOUNTER
Patient called her insurance and they claim that they have not received a PA.  Patient was advised that she needs to initiate a Verbal Prior Authorization and that Amina Olivares herself needs to call [phone] [employer] Member number [id number] and Group number [id number].  Patient repeated multiple times that the [employer] Agent was very adamant that it needs to be verbal from Amina Olivares only. Looking at the PA below it actually appears that it would be called a Redetermination rather than a PA.

## 2023-09-15 NOTE — TELEPHONE ENCOUNTER
Attempted to contact patient. Left voice message to call back. Please advise patient of message from Amina when calling back.     Amalia Haq RN  [facility]

## 2023-09-19 ENCOUNTER — TELEPHONE (OUTPATIENT)
Dept: INTERNAL MEDICINE | Facility: CLINIC | Age: 53
End: 2023-09-19

## 2023-09-19 ENCOUNTER — ALLIED HEALTH/NURSE VISIT (OUTPATIENT)
Dept: EDUCATION SERVICES | Facility: CLINIC | Age: 53
End: 2023-09-19
Payer: COMMERCIAL

## 2023-09-19 DIAGNOSIS — E66.01 MORBID OBESITY (H): Primary | ICD-10-CM

## 2023-09-19 DIAGNOSIS — E11.69 TYPE 2 DIABETES MELLITUS WITH OTHER SPECIFIED COMPLICATION, WITHOUT LONG-TERM CURRENT USE OF INSULIN (H): ICD-10-CM

## 2023-09-19 PROCEDURE — G0108 DIAB MANAGE TRN  PER INDIV: HCPCS | Mod: AE

## 2023-09-19 RX ORDER — METFORMIN HCL 500 MG
500 TABLET, EXTENDED RELEASE 24 HR ORAL
Qty: 90 TABLET | Refills: 3 | Status: SHIPPED | OUTPATIENT
Start: 2023-09-19 | End: 2024-08-20

## 2023-09-19 NOTE — TELEPHONE ENCOUNTER
Patient calls and will agree to metformin after all. [facility] [employer] [facility] [place] [facility] [address].

## 2023-09-19 NOTE — LETTER
9/19/2023         RE: Amalia Newman  [id number] [facility] [id number]        Dear Colleague,    Thank you for referring your patient, Amalia Newman, to the [facility]. Please see a copy of my visit note below.    Diabetes Self-Management Education & Support  Presents for: Initial Assessment for new diagnosis  Type of Service: In Person Visit    ASSESSMENT:  Amalia is newly diagnosed with diabetes. She brought her meter and supplies to the visit and asked for instruction on use.   Became tearful during instruction but practiced testing BG multiple times and she was able to successfully test her BG. Fasting at 135 mg/dL.     Patient's most recent   Lab Results   Component Value Date    A1C 6.5 08/22/2023    A1C 6.1 05/25/2021     is meeting goal of <7.0    Diabetes knowledge and skills assessment:   Patient is knowledgeable in diabetes management concepts related to: Being Active    Continue education with the following diabetes management concepts: Healthy Eating, Monitoring, Problem Solving, and Reducing Risks    Based on learning assessment above, most appropriate setting for further diabetes education would be: Individual setting.      PLAN  Blood sugar monitoring:  Check blood sugars fasting and 2 hours after largest meal of the day  Fasting and before meal targets:  mg/dL  2 hours after start of meal: <180 mg/dL  Keep a blood glucose record for next visit.    Meal Plan Recommendation:   Time did not allow for complete instruction today  Positive reinforcement given for making changes in her meal plan and stopping regular Mt Dew.    Exercise / activity plan:   Continue walking regimen    Follow-up: 2 weeks    See Care Plan for co-developed, patient-state behavior change goals.  AVS provided for patient today.    Education Materials Provided:  BG Log Sheet    SUBJECTIVE/OBJECTIVE:  Presents for: Initial Assessment for new diagnosis  Accompanied by:  "Self  Diabetes education in the past 24mo: Yes  Focus of Visit: Monitoring, Healthy Eating  Diabetes type: Type 2  Date of diagnosis: August 2023  Disease course: Stable  How confident are you filling out medical forms by yourself:: Extremely  Transportation concerns: No  Difficulty affording diabetes testing supplies?: No  Other concerns:: None  Cultural Influences/Ethnic Background:  Not  or     Diabetes Symptoms & Complications:  Symptom course: Stable       Patient Problem List and Family Medical History reviewed for relevant medical history, current medical status, and diabetes risk factors.    Vitals:  LMP 12/06/2005 (LMP Unknown)   Estimated body mass index is 36.14 kg/m  as calculated from the following:    Height as of 8/22/23: 1.6 m (5' 3\").    Weight as of 8/22/23: 92.5 kg (204 lb).   Last 3 BP:   BP Readings from Last 3 Encounters:   08/22/23 128/64   07/20/23 133/86   10/13/22 (!) 140/70       History   Smoking Status    Never   Smokeless Tobacco    Never       Labs:  Lab Results   Component Value Date    A1C 6.5 08/22/2023    A1C 6.1 05/25/2021     Lab Results   Component Value Date     08/22/2023     05/06/2022     05/25/2021     Lab Results   Component Value Date     08/22/2023     05/25/2021     HDL Cholesterol   Date Value Ref Range Status   05/25/2021 49 (L) >49 mg/dL Final     Direct Measure HDL   Date Value Ref Range Status   08/22/2023 47 (L) >=50 mg/dL Final   ]  GFR Estimate   Date Value Ref Range Status   08/22/2023 >90 >60 mL/min/1.73m2 Final   05/25/2021 >90 >60 mL/min/[1.73_m2] Final     Comment:     Non  GFR Calc  Starting 12/18/2018, serum creatinine based estimated GFR (eGFR) will be   calculated using the Chronic Kidney Disease Epidemiology Collaboration   (CKD-EPI) equation.       GFR Estimate If Black   Date Value Ref Range Status   05/25/2021 >90 >60 mL/min/[1.73_m2] Final     Comment:      GFR " Calc  Starting 12/18/2018, serum creatinine based estimated GFR (eGFR) will be   calculated using the Chronic Kidney Disease Epidemiology Collaboration   (CKD-EPI) equation.       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 : Moderate (like brisk walking)  Exercise Minutes per Week: 210  Barrier to exercise: None    Monitoring:  Monitoring Assessed Today: No (Taught FreeStyle LIte meter)  Did patient bring glucose meter to appointment? : Yes  Blood Glucose Meter: FreeStyle  Times checking blood sugar at home (number): Never    Taking Medications: Not taking. Relayed message from PCP (in My chart) regarding PA and Wegovy  Diabetes Medication(s)       Incretin Mimetic Agents       Semaglutide (RYBELSUS) 3 MG tablet    Take 3 mg by mouth daily          Taking Medication Assessed Today: Yes  Current Treatments: None    Problem Solving:  Problem Solving Assessed Today: Yes  Is the patient at risk for hypoglycemia?: No    Healthy Coping:  Healthy Coping Assessed Today: Yes  Emotional response to diabetes: Ready to learn  Informal Support system:: Spouse  Stage of change: ACTION (Actively working towards change)  Support resources: Websites  Patient Activation Measure Survey  Score:      7/19/2011     9:00 AM   CORINE Score (Last Two)   CORINE Raw Score 39   Activation Score 56.4   CORINE Level 3     Care Plan and Education Provided:  Care Plan: Diabetes   Updates made by Amalia Jacob since 9/19/2023 12:00 AM        Problem: HbA1C Not In Goal         Goal: Establish Regular Follow-Ups with PCP         Task: Discuss with PCP the recommended timing for patient's next follow up visit(s)    Responsible User: Amalia Jacob        Task: Discuss schedule for PCP visits with patient    Responsible User: Amalia Jacob        Goal: Get HbA1C Level in Goal         Task: Educate patient on diabetes education self-management topics    Responsible User: Amalia Jacob        Task: Educate patient on benefits of regular glucose monitoring    Responsible User: Amalia Jacob        Task: Refer patient to appropriate extended care team member, as needed (Medication Therapy Management, Behavioral Health, Physical Therapy, etc.)    Responsible User: Amalia Jacob        Task: Discuss diabetes treatment plan with patient    Responsible User: Amalia Jacob        Problem: Diabetes Self-Management Education Needed to Optimize Self-Care Behaviors         Goal: Understand diabetes pathophysiology and disease progression         Task: Provide education on diabetes pathophysiology and disease progression specfic to patient's diabetes type Completed 9/19/2023   Responsible User: Amalia Jacob        Goal: Healthy Eating - follow a healthy eating pattern for diabetes         Task: Provide education on portion control and consistency in amount, composition and timing of food intake    Responsible User: Amalia Jacob        Task: Provide education on managing carbohydrate intake (carbohydrate counting, plate planning method, etc.)    Responsible User: Amalia Jacob        Task: Provide education on weight management    Responsible User: Amalia Jacob        Task: Provide education on heart  healthy eating    Responsible User: Amalia Jacob        Task: Provide education on eating out    Responsible User: Amalia Jacob        Task: Develop individualized healthy eating plan with patient    Responsible User: Amalia Jacbo        Goal: Being Active - get regular physical activity, working up to at least 150 minutes per week         Task: Provide education on relationship of activity to glucose and precautions to take if at risk for low glucose Completed 9/19/2023   Responsible User: Amalia Jacob        Task: Discuss barriers to physical activity with patient    Responsible User: Amalia Jacob        Task: Develop physical activity plan with patient    Responsible User: Amalia Jacob        Task: Explore community resources including walking groups, assistance programs, and home videos    Responsible User: Amalia Jacob        Goal: Monitoring - monitor glucose and ketones as directed         Task: Provide education on blood glucose monitoring (purpose, proper technique, frequency, glucose targets, interpreting results, when to use glucose control solution, sharps disposal) Completed 9/19/2023   Responsible User: Amalia Jacob        Task: Provide education on continuous glucose monitoring (sensor placement, use of elian or /reader, understanding glucose trends, alerts and alarms, differences between sensor glucose and blood glucose)    Responsible User: Amalia Jacob        Task: Provide education on ketone monitoring (when to monitor, frequency, etc.)    Responsible User: Amalia Jacob        Goal: Taking Medication - patient is consistently taking medications as directed         Task: Provide education on action of prescribed medication, including when to take and possible side effects    Responsible User: Amalia Jacob        Task: Provide education on insulin and injectable diabetes medications, including administration, storage, site selection and rotation for  injection sites    Responsible User: Amalia Jacob        Task: Discuss barriers to medication adherence with patient and provide management technique ideas as appropriate    Responsible User: Amalia Jacob        Task: Provide education on frequency and refill details of medications    Responsible User: Amalia Jacob        Goal: Problem Solving - know how to prevent and manage short-term diabetes complications         Task: Provide education on high blood glucose - causes, signs/symptoms, prevention and treatment Completed 9/19/2023   Responsible User: Amalia Jacob        Task: Provide education on low blood glucose - causes, signs/symptoms, prevention, treatment, carrying a carbohydrate source at all times, and medical identification    Responsible User: Amalia Jacob        Task: Provide education on safe travel with diabetes    Responsible User: Amalia Jacob        Task: Provide education on how to care for diabetes on sick days    Responsible User: Amalia Jacob        Task: Provide education on when to call a health care provider Completed 9/19/2023   Responsible User: Amalia Jacob        Goal: Reducing Risks - know how to prevent and treat long-term diabetes complications         Task: Provide education on major complications of diabetes, prevention, early diagnostic measures and treatment of complications    Responsible User: Amalia Jacob        Task: Provide education on recommended care for dental, eye and foot health    Responsible User: Amalia Jacob        Task: Provide education on Hemoglobin A1c - goals and relationship to blood glucose levels Completed 9/19/2023   Responsible User: Amalia Jacob        Task: Provide education on recommendations for heart health - lipid levels and goals, blood pressure and goals, and aspirin therapy, if indicated    Responsible User: Amalia Jacob        Task: Provide education on tobacco cessation    Responsible User: Nidia  Amalia MOYER        Goal: Healthy Coping - use available resources to cope with the challenges of managing diabetes         Task: Discuss recognizing feelings about having diabetes    Responsible User: Amalia Jacob        Task: Provide education on the benefits of making appropriate lifestyle changes Completed 9/19/2023   Responsible User: Amalia Jacob        Task: Provide education on benefits of utilizing support systems    Responsible User: Amalia Jacob        Task: Discuss methods for coping with stress    Responsible User: Amalia Jacob        Task: Provide education on when to seek professional counseling    Responsible User: Amalia Jacob RDN, [place], [facility]    Time Spent: 45 minutes  Encounter Type: Individual    Any diabetes medication dose changes were made via the CDE Protocol per the patient's referring provider. A copy of this encounter was shared with the provider.

## 2023-09-19 NOTE — TELEPHONE ENCOUNTER
Patient returned call. She was informed of Amina's message and  was frustrated. She stated she would agree to begin taking the metformin even tho she says the tablet is very large but she will manage.

## 2023-09-19 NOTE — PROGRESS NOTES
Diabetes Self-Management Education & Support  Presents for: Initial Assessment for new diagnosis  Type of Service: In Person Visit    ASSESSMENT:  Amalia is newly diagnosed with diabetes. She brought her meter and supplies to the visit and asked for instruction on use.   Became tearful during instruction but practiced testing BG multiple times and she was able to successfully test her BG. Fasting at 135 mg/dL.     Patient's most recent   Lab Results   Component Value Date    A1C 6.5 08/22/2023    A1C 6.1 05/25/2021     is meeting goal of <7.0    Diabetes knowledge and skills assessment:   Patient is knowledgeable in diabetes management concepts related to: Being Active    Continue education with the following diabetes management concepts: Healthy Eating, Monitoring, Problem Solving, and Reducing Risks    Based on learning assessment above, most appropriate setting for further diabetes education would be: Individual setting.      PLAN  Blood sugar monitoring:  Check blood sugars fasting and 2 hours after largest meal of the day  Fasting and before meal targets:  mg/dL  2 hours after start of meal: <180 mg/dL  Keep a blood glucose record for next visit.    Meal Plan Recommendation:   Time did not allow for complete instruction today  Positive reinforcement given for making changes in her meal plan and stopping regular Mt Dew.    Exercise / activity plan:   Continue walking regimen    Follow-up: 2 weeks    See Care Plan for co-developed, patient-state behavior change goals.  AVS provided for patient today.    Education Materials Provided:  BG Log Sheet    SUBJECTIVE/OBJECTIVE:  Presents for: Initial Assessment for new diagnosis  Accompanied by: Self  Diabetes education in the past 24mo: Yes  Focus of Visit: Monitoring, Healthy Eating  Diabetes type: Type 2  Date of diagnosis: August 2023  Disease course: Stable  How confident are you filling out medical forms by yourself:: Extremely  Transportation concerns:  "No  Difficulty affording diabetes testing supplies?: No  Other concerns:: None  Cultural Influences/Ethnic Background:  Not  or     Diabetes Symptoms & Complications:  Symptom course: Stable       Patient Problem List and Family Medical History reviewed for relevant medical history, current medical status, and diabetes risk factors.    Vitals:  LMP 12/06/2005 (LMP Unknown)   Estimated body mass index is 36.14 kg/m  as calculated from the following:    Height as of 8/22/23: 1.6 m (5' 3\").    Weight as of 8/22/23: 92.5 kg (204 lb).   Last 3 BP:   BP Readings from Last 3 Encounters:   08/22/23 128/64   07/20/23 133/86   10/13/22 (!) 140/70       History   Smoking Status    Never   Smokeless Tobacco    Never       Labs:  Lab Results   Component Value Date    A1C 6.5 08/22/2023    A1C 6.1 05/25/2021     Lab Results   Component Value Date     08/22/2023     05/06/2022     05/25/2021     Lab Results   Component Value Date     08/22/2023     05/25/2021     HDL Cholesterol   Date Value Ref Range Status   05/25/2021 49 (L) >49 mg/dL Final     Direct Measure HDL   Date Value Ref Range Status   08/22/2023 47 (L) >=50 mg/dL Final   ]  GFR Estimate   Date Value Ref Range Status   08/22/2023 >90 >60 mL/min/1.73m2 Final   05/25/2021 >90 >60 mL/min/[1.73_m2] Final     Comment:     Non  GFR Calc  Starting 12/18/2018, serum creatinine based estimated GFR (eGFR) will be   calculated using the Chronic Kidney Disease Epidemiology Collaboration   (CKD-EPI) equation.       GFR Estimate If Black   Date Value Ref Range Status   05/25/2021 >90 >60 mL/min/[1.73_m2] Final     Comment:      GFR Calc  Starting 12/18/2018, serum creatinine based estimated GFR (eGFR) will be   calculated using the Chronic Kidney Disease Epidemiology Collaboration   (CKD-EPI) equation.       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 : Moderate (like brisk walking)  Exercise Minutes per Week: 210  Barrier to exercise: None    Monitoring:  Monitoring Assessed Today: No (Taught FreeStyle LIte meter)  Did patient bring glucose meter to appointment? : Yes  Blood Glucose Meter: FreeStyle  Times checking blood sugar at home (number): Never    Taking Medications: Not taking. Relayed message from PCP (in My chart) regarding PA and Wegovy  Diabetes Medication(s)       Incretin Mimetic Agents       Semaglutide (RYBELSUS) 3 MG tablet    Take 3 mg by mouth daily          Taking Medication Assessed Today: Yes  Current Treatments: None    Problem Solving:  Problem Solving Assessed Today: Yes  Is the patient at risk for hypoglycemia?: No    Healthy Coping:  Healthy Coping Assessed Today: Yes  Emotional response to diabetes: Ready to learn  Informal Support system:: Spouse  Stage of change: ACTION (Actively working towards change)  Support resources: Websites  Patient Activation Measure Survey Score:      7/19/2011     9:00 AM   CORINE Score (Last Two)   CORINE Raw Score 39   Activation Score 56.4   CORINE Level 3     Care Plan and Education Provided:  Care Plan: Diabetes   Updates made by Amalia Jacob since 9/19/2023 12:00 AM        Problem: HbA1C Not In Goal         Goal:  Establish Regular Follow-Ups with PCP         Task: Discuss with PCP the recommended timing for patient's next follow up visit(s)    Responsible User: Amalia Jacob        Task: Discuss schedule for PCP visits with patient    Responsible User: Amalia Jacob        Goal: Get HbA1C Level in Goal         Task: Educate patient on diabetes education self-management topics    Responsible User: Amalia Jacob        Task: Educate patient on benefits of regular glucose monitoring    Responsible User: Amalia Jacob        Task: Refer patient to appropriate extended care team member, as needed (Medication Therapy Management, Behavioral Health, Physical Therapy, etc.)    Responsible User: Amalia Jacob        Task: Discuss diabetes treatment plan with patient    Responsible User: Amalia Jacob        Problem: Diabetes Self-Management Education Needed to Optimize Self-Care Behaviors         Goal: Understand diabetes pathophysiology and disease progression         Task: Provide education on diabetes pathophysiology and disease progression specfic to patient's diabetes type Completed 9/19/2023   Responsible User: Amalia Jacob        Goal: Healthy Eating - follow a healthy eating pattern for diabetes         Task: Provide education on portion control and consistency in amount, composition and timing of food intake    Responsible User: Amalia Jacob        Task: Provide education on managing carbohydrate intake (carbohydrate counting, plate planning method, etc.)    Responsible User: Amalia Jacob        Task: Provide education on weight management    Responsible User: Amalia Jacob        Task: Provide education on heart healthy eating    Responsible User: Amalia Jacob        Task: Provide education on eating out    Responsible User: Amalia Jacob        Task: Develop individualized healthy eating plan with patient    Responsible User: Amalia Jacob        Goal: Being Active - get regular  physical activity, working up to at least 150 minutes per week         Task: Provide education on relationship of activity to glucose and precautions to take if at risk for low glucose Completed 9/19/2023   Responsible User: Amalia Jacob        Task: Discuss barriers to physical activity with patient    Responsible User: Amalia Jacob        Task: Develop physical activity plan with patient    Responsible User: Amalia Jacob        Task: Explore community resources including walking groups, assistance programs, and home videos    Responsible User: Amalia Jacob        Goal: Monitoring - monitor glucose and ketones as directed         Task: Provide education on blood glucose monitoring (purpose, proper technique, frequency, glucose targets, interpreting results, when to use glucose control solution, sharps disposal) Completed 9/19/2023   Responsible User: Amalia Jacob        Task: Provide education on continuous glucose monitoring (sensor placement, use of elian or /reader, understanding glucose trends, alerts and alarms, differences between sensor glucose and blood glucose)    Responsible User: Amalia Jacob        Task: Provide education on ketone monitoring (when to monitor, frequency, etc.)    Responsible User: Amalia Jacob        Goal: Taking Medication - patient is consistently taking medications as directed         Task: Provide education on action of prescribed medication, including when to take and possible side effects    Responsible User: Amalia Jacob        Task: Provide education on insulin and injectable diabetes medications, including administration, storage, site selection and rotation for injection sites    Responsible User: Amalia Jacob        Task: Discuss barriers to medication adherence with patient and provide management technique ideas as appropriate    Responsible User: Amalia Jacob        Task: Provide education on frequency and refill details of  medications    Responsible User: Amalia Jacob        Goal: Problem Solving - know how to prevent and manage short-term diabetes complications         Task: Provide education on high blood glucose - causes, signs/symptoms, prevention and treatment Completed 9/19/2023   Responsible User: Amalia Jacob        Task: Provide education on low blood glucose - causes, signs/symptoms, prevention, treatment, carrying a carbohydrate source at all times, and medical identification    Responsible User: Amalia Jacob        Task: Provide education on safe travel with diabetes    Responsible User: Amalia Jacob        Task: Provide education on how to care for diabetes on sick days    Responsible User: Amalia Jacob        Task: Provide education on when to call a health care provider Completed 9/19/2023   Responsible User: Amalia Jacob        Goal: Reducing Risks - know how to prevent and treat long-term diabetes complications         Task: Provide education on major complications of diabetes, prevention, early diagnostic measures and treatment of complications    Responsible User: Amalia Jacob        Task: Provide education on recommended care for dental, eye and foot health    Responsible User: Amalia Jacob        Task: Provide education on Hemoglobin A1c - goals and relationship to blood glucose levels Completed 9/19/2023   Responsible User: Amalia Jacob        Task: Provide education on recommendations for heart health - lipid levels and goals, blood pressure and goals, and aspirin therapy, if indicated    Responsible User: Amalia Jacob        Task: Provide education on tobacco cessation    Responsible User: Amalia Jacob        Goal: Healthy Coping - use available resources to cope with the challenges of managing diabetes         Task: Discuss recognizing feelings about having diabetes    Responsible User: Amalia Jacob        Task: Provide education on the benefits of making  appropriate lifestyle changes Completed 9/19/2023   Responsible User: Amalia Jacob        Task: Provide education on benefits of utilizing support systems    Responsible User: Amalia Jacob        Task: Discuss methods for coping with stress    Responsible User: Amalia Jacob        Task: Provide education on when to seek professional counseling    Responsible User: Amalia Jacob RDN, [place], CDCES    Time Spent: 45 minutes  Encounter Type: Individual    Any diabetes medication dose changes were made via the CDE Protocol per the patient's referring provider. A copy of this encounter was shared with the provider.

## 2023-09-28 ENCOUNTER — ALLIED HEALTH/NURSE VISIT (OUTPATIENT)
Dept: INTERNAL MEDICINE | Facility: CLINIC | Age: 53
End: 2023-09-28
Payer: COMMERCIAL

## 2023-09-28 DIAGNOSIS — Z23 NEED FOR HEPATITIS A AND B VACCINATION: ICD-10-CM

## 2023-09-28 DIAGNOSIS — Z23 NEED FOR PROPHYLACTIC VACCINATION AND INOCULATION AGAINST INFLUENZA: Primary | ICD-10-CM

## 2023-09-28 PROCEDURE — 90682 RIV4 VACC RECOMBINANT DNA IM: CPT

## 2023-09-28 PROCEDURE — 90636 HEP A/HEP B VACC ADULT IM: CPT

## 2023-09-28 PROCEDURE — 99207 PR NO CHARGE NURSE ONLY: CPT

## 2023-09-28 PROCEDURE — 90471 IMMUNIZATION ADMIN: CPT

## 2023-09-28 PROCEDURE — 90472 IMMUNIZATION ADMIN EACH ADD: CPT

## 2023-10-03 ENCOUNTER — ALLIED HEALTH/NURSE VISIT (OUTPATIENT)
Dept: EDUCATION SERVICES | Facility: CLINIC | Age: 53
End: 2023-10-03
Payer: COMMERCIAL

## 2023-10-03 DIAGNOSIS — E11.69 TYPE 2 DIABETES MELLITUS WITH OTHER SPECIFIED COMPLICATION, WITHOUT LONG-TERM CURRENT USE OF INSULIN (H): Primary | ICD-10-CM

## 2023-10-03 PROCEDURE — G0108 DIAB MANAGE TRN  PER INDIV: HCPCS | Mod: AE

## 2023-10-03 NOTE — PROGRESS NOTES
Diabetes Self-Management Education & Support  Presents for: Follow-up    Type of Service: In Person Visit    ASSESSMENT:  Amalia brought BG records as below and all are in target. Positive reinforcement given for testing.  Discussed meal planning today using plate method, label reading and carbohydrate counting. She has a good initial understanding.   She is taking Metformin as prescribed and tolerating this.     Patient's most recent   Lab Results   Component Value Date    A1C 6.5 08/22/2023    A1C 6.1 05/25/2021     is meeting goal of <7.0    Diabetes knowledge and skills assessment:   Patient is knowledgeable in diabetes management concepts related to: Being Active, Monitoring, and Taking Medication    Continue education with the following diabetes management concepts: Healthy Eating, Problem Solving, and Reducing Risks    Based on learning assessment above, most appropriate setting for further diabetes education would be: Individual setting.      PLAN    Continue diabetes medications as prescribed.     Blood sugar monitoring: OK to decrease to 3 times per week  Check blood sugars fasting and 2 hours after largest meal of the day  Fasting and before meal targets:  mg/dL  2 hours after start of meal: <180 mg/dL  Keep a blood glucose record for next visit.    Meal Plan Recommendation:   Use portion control and plate planning method.  Carbohydrates to aim for at meals: 30-45 grams and snacks: 0-30 grams.  Use [employer] for recipe and meal planning ideas    Exercise / activity plan:   Recommend starting slow and increasing as tolerated to goal of 150 minutes per week.     Follow-up: 5 weeks    See Care Plan for co-developed, patient-state behavior change goals.    SUBJECTIVE/OBJECTIVE:  Presents for: Follow-up  Accompanied by: Self  Diabetes education in the past 24mo: Yes  Focus of Visit: Monitoring, Healthy Eating  Diabetes type: Type 2  Date of diagnosis: August 2023  Disease course: Stable  How  "confident are you filling out medical forms by yourself:: Extremely  Transportation concerns: No  Difficulty affording diabetes testing supplies?: No  Other concerns:: None  Cultural Influences/Ethnic Background:  Not  or     Diabetes Symptoms & Complications:  Symptom course: Stable       Patient Problem List and Family Medical History reviewed for relevant medical history, current medical status, and diabetes risk factors.    Vitals:  LMP 12/06/2005 (LMP Unknown)   Estimated body mass index is 36.14 kg/m  as calculated from the following:    Height as of 8/22/23: 1.6 m (5' 3\").    Weight as of 8/22/23: 92.5 kg (204 lb).   Last 3 BP:   BP Readings from Last 3 Encounters:   08/22/23 128/64   07/20/23 133/86   10/13/22 (!) 140/70       History   Smoking Status    Never   Smokeless Tobacco    Never       Labs:  Lab Results   Component Value Date    A1C 6.5 08/22/2023    A1C 6.1 05/25/2021     Lab Results   Component Value Date     08/22/2023     05/06/2022     05/25/2021     Lab Results   Component Value Date     08/22/2023     05/25/2021     HDL Cholesterol   Date Value Ref Range Status   05/25/2021 49 (L) >49 mg/dL Final     Direct Measure HDL   Date Value Ref Range Status   08/22/2023 47 (L) >=50 mg/dL Final   ]  GFR Estimate   Date Value Ref Range Status   08/22/2023 >90 >60 mL/min/1.73m2 Final   05/25/2021 >90 >60 mL/min/[1.73_m2] Final     Comment:     Non  GFR Calc  Starting 12/18/2018, serum creatinine based estimated GFR (eGFR) will be   calculated using the Chronic Kidney Disease Epidemiology Collaboration   (CKD-EPI) equation.       GFR Estimate If Black   Date Value Ref Range Status   05/25/2021 >90 >60 mL/min/[1.73_m2] Final     Comment:      GFR Calc  Starting 12/18/2018, serum creatinine based estimated GFR (eGFR) will be   calculated using the Chronic Kidney Disease Epidemiology Collaboration   (CKD-EPI) equation.   "     Lab Results   Component Value Date    CR 0.35 08/22/2023    CR 0.40 05/25/2021     No results found for: MICROALBUMIN    Healthy Eating:  Healthy Eating Assessed Today: Yes  Meal planning/habits: None  Meals include: Breakfast, Lunch, Dinner  Breakfast: bagel with butter and peanut butter and no jelly  Lunch: sandwich or leftovers  Dinner: steak dinner with potato and vegetables OR asparagus, shrimp and tartar sauce and later bagel with peanut butter  Other: Not eating french fries, was drinking 2 cans of regular Mt dew, less diet soda  Beverages: Water, Soda, Diet soda    Being Active:  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 : Moderate (like brisk walking)  Exercise Minutes per Week: 210  Barrier to exercise: None    Monitoring:  Monitoring Assessed Today: No (Taught FreeStyle Lite meter)  Did patient bring glucose meter to appointment? : Yes  Blood Glucose Meter: FreeStyle  Times checking blood sugar at home (number): All in target    Date Breakfast  Lunch  Dinner  Bedtime    Before After Before After Before After    10/3 125         10/2  146        10/1 114         9/30 71 132        9/29      105    9/28    113 89 166    9/26 93       173          Taking Medications:  Diabetes Medication(s)       Biguanides       metFORMIN (GLUCOPHAGE XR) 500 MG 24 hr tablet    Take 1 tablet (500 mg) by mouth daily (with dinner)      Incretin Mimetic Agents       Semaglutide (RYBELSUS) 3 MG tablet    Take 3 mg by mouth daily     Patient not taking: Reported on 9/19/2023          Taking Medication Assessed Today: Yes  Current Treatments: Oral Medication (taken by mouth)  Problems taking diabetes medications regularly?: No  Diabetes medication side effects?: No    Problem Solving:  Problem Solving Assessed Today: Yes  Is  the patient at risk for hypoglycemia?: No    Healthy Coping:  Healthy Coping Assessed Today: Yes  Emotional response to diabetes: Ready to learn  Informal Support system:: Spouse  Stage of change: ACTION (Actively working towards change)  Support resources: Websites  Patient Activation Measure Survey Score:      7/19/2011     9:00 AM   CORINE Score (Last Two)   CORINE Raw Score 39   Activation Score 56.4   CORINE Level 3     Care Plan and Education Provided:  Care Plan: Diabetes   Updates made by Amalia Jacob since 10/3/2023 12:00 AM        Problem: Diabetes Self-Management Education Needed to Optimize Self-Care Behaviors         Goal: Healthy Eating - follow a healthy eating pattern for diabetes    Start Date: 10/3/2023   This Visit's Progress: 0%   Note:    Use the plate method for 2 meals/day at least 5 days/week for the next 4 weeks.          Task: Provide education on portion control and consistency in amount, composition and timing of food intake Completed 10/3/2023   Responsible User: Amalia Jacob        Task: Provide education on managing carbohydrate intake (carbohydrate counting, plate planning method, etc.) Completed 10/3/2023   Responsible User: Amalia Jacob        Task: Provide education on weight management Completed 10/3/2023   Responsible User: Amalia Jacob        Task: Develop individualized healthy eating plan with patient Completed 10/3/2023   Responsible User: Amalia Jacob        Goal: Taking Medication - patient is consistently taking medications as directed         Task: Provide education on action of prescribed medication, including when to take and possible side effects Completed 10/3/2023   Responsible User: Amalia Jacob        Goal: Healthy Coping - use available resources to cope with the challenges of managing diabetes         Task: Provide education on benefits of utilizing support systems Completed 10/3/2023   Responsible User: Amalia Jacob RDN,  [place] MENG      Time Spent: 60 minutes  Encounter Type: Individual    Any diabetes medication dose changes were made via the CDE Protocol per the patient's referring provider. A copy of this encounter was shared with the provider.

## 2023-10-03 NOTE — LETTER
10/3/2023         RE: Amalia Newman  [address] [facility] [id number]        Dear Colleague,    Thank you for referring your patient, Amalia Newman, to the [facility]. Please see a copy of my visit note below.    Diabetes Self-Management Education & Support  Presents for: Follow-up    Type of Service: In Person Visit    ASSESSMENT:  Amalia brought BG records as below and all are in target. Positive reinforcement given for testing.  Discussed meal planning today using plate method, label reading and carbohydrate counting. She has a good initial understanding.   She is taking Metformin as prescribed and tolerating this.     Patient's most recent   Lab Results   Component Value Date    A1C 6.5 08/22/2023    A1C 6.1 05/25/2021     is meeting goal of <7.0    Diabetes knowledge and skills assessment:   Patient is knowledgeable in diabetes management concepts related to: Being Active, Monitoring, and Taking Medication    Continue education with the following diabetes management concepts: Healthy Eating, Problem Solving, and Reducing Risks    Based on learning assessment above, most appropriate setting for further diabetes education would be: Individual setting.      PLAN    Continue diabetes medications as prescribed.     Blood sugar monitoring: OK to decrease to 3 times per week  Check blood sugars fasting and 2 hours after largest meal of the day  Fasting and before meal targets:  mg/dL  2 hours after start of meal: <180 mg/dL  Keep a blood glucose record for next visit.    Meal Plan Recommendation:   Use portion control and plate planning method.  Carbohydrates to aim for at meals: 30-45 grams and snacks: 0-30 grams.  Use [email or web address] for recipe and meal planning ideas    Exercise / activity plan:   Recommend starting slow and increasing as tolerated to goal of 150 minutes per week.     Follow-up: 5 weeks    See Care Plan for co-developed, patient-state  "behavior change goals.    SUBJECTIVE/OBJECTIVE:  Presents for: Follow-up  Accompanied by: Self  Diabetes education in the past 24mo: Yes  Focus of Visit: Monitoring, Healthy Eating  Diabetes type: Type 2  Date of diagnosis: August 2023  Disease course: Stable  How confident are you filling out medical forms by yourself:: Extremely  Transportation concerns: No  Difficulty affording diabetes testing supplies?: No  Other concerns:: None  Cultural Influences/Ethnic Background:  Not  or     Diabetes Symptoms & Complications:  Symptom course: Stable       Patient Problem List and Family Medical History reviewed for relevant medical history, current medical status, and diabetes risk factors.    Vitals:  LMP 12/06/2005 (LMP Unknown)   Estimated body mass index is 36.14 kg/m  as calculated from the following:    Height as of 8/22/23: 1.6 m (5' 3\").    Weight as of 8/22/23: 92.5 kg (204 lb).   Last 3 BP:   BP Readings from Last 3 Encounters:   08/22/23 128/64   07/20/23 133/86   10/13/22 (!) 140/70       History   Smoking Status    Never   Smokeless Tobacco    Never       Labs:  Lab Results   Component Value Date    A1C 6.5 08/22/2023    A1C 6.1 05/25/2021     Lab Results   Component Value Date     08/22/2023     05/06/2022     05/25/2021     Lab Results   Component Value Date     08/22/2023     05/25/2021     HDL Cholesterol   Date Value Ref Range Status   05/25/2021 49 (L) >49 mg/dL Final     Direct Measure HDL   Date Value Ref Range Status   08/22/2023 47 (L) >=50 mg/dL Final   ]  GFR Estimate   Date Value Ref Range Status   08/22/2023 >90 >60 mL/min/1.73m2 Final   05/25/2021 >90 >60 mL/min/[1.73_m2] Final     Comment:     Non  GFR Calc  Starting 12/18/2018, serum creatinine based estimated GFR (eGFR) will be   calculated using the Chronic Kidney Disease Epidemiology Collaboration   (CKD-EPI) equation.       GFR Estimate If Black   Date Value Ref Range Status "   05/25/2021 >90 >60 mL/min/[1.73_m2] Final     Comment:      GFR Calc  Starting 12/18/2018, serum creatinine based estimated GFR (eGFR) will be   calculated using the Chronic Kidney Disease Epidemiology Collaboration   (CKD-EPI) equation.       Lab Results   Component Value Date    CR 0.35 08/22/2023    CR 0.40 05/25/2021     No results found for: MICROALBUMIN    Healthy Eating:  Healthy Eating Assessed Today: Yes  Meal planning/habits: None  Meals include: Breakfast, Lunch, Dinner  Breakfast: bagel with butter and peanut butter and no jelly  Lunch: sandwich or leftovers  Dinner: steak dinner with potato and vegetables OR asparagus, shrimp and tartar sauce and later bagel with peanut butter  Other: Not eating french fries, was drinking 2 cans of regular Mt dew, less diet soda  Beverages: Water, Soda, Diet soda    Being Active:  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 : Moderate (like brisk walking)  Exercise Minutes per Week: 210  Barrier to exercise: None    Monitoring:  Monitoring Assessed Today: No (Taught FreeStyle Lite meter)  Did patient bring glucose meter to appointment? : Yes  Blood Glucose Meter: FreeStyle  Times checking blood sugar at home (number): All in target    Date Breakfast  Lunch  Dinner  Bedtime    Before After Before After Before After    10/3 125         10/2  146        10/1 114         9/30 71 132        9/29      105    9/28    113 89 166    9/26 93       173          Taking Medications:  Diabetes Medication(s)       Biguanides       metFORMIN (GLUCOPHAGE XR) 500 MG 24 hr tablet    Take 1 tablet (500 mg) by mouth daily (with dinner)      Incretin Mimetic Agents       Semaglutide (RYBELSUS) 3 MG tablet    Take 3 mg by mouth daily     Patient not taking: Reported on  9/19/2023          Taking Medication Assessed Today: Yes  Current Treatments: Oral Medication (taken by mouth)  Problems taking diabetes medications regularly?: No  Diabetes medication side effects?: No    Problem Solving:  Problem Solving Assessed Today: Yes  Is the patient at risk for hypoglycemia?: No    Healthy Coping:  Healthy Coping Assessed Today: Yes  Emotional response to diabetes: Ready to learn  Informal Support system:: Spouse  Stage of change: ACTION (Actively working towards change)  Support resources: Websites  Patient Activation Measure Survey Score:      7/19/2011     9:00 AM   CORINE Score (Last Two)   CORINE Raw Score 39   Activation Score 56.4   CORINE Level 3     Care Plan and Education Provided:  Care Plan: Diabetes   Updates made by Amalia Jacob since 10/3/2023 12:00 AM        Problem: Diabetes Self-Management Education Needed to Optimize Self-Care Behaviors         Goal: Healthy Eating - follow a healthy eating pattern for diabetes    Start Date: 10/3/2023   This Visit's Progress: 0%   Note:    Use the plate method for 2 meals/day at least 5 days/week for the next 4 weeks.          Task: Provide education on portion control and consistency in amount, composition and timing of food intake Completed 10/3/2023   Responsible User: Amalia Jacob        Task: Provide education on managing carbohydrate intake (carbohydrate counting, plate planning method, etc.) Completed 10/3/2023   Responsible User: Amalia Jacob        Task: Provide education on weight management Completed 10/3/2023   Responsible User: Amalia Jacob        Task: Develop individualized healthy eating plan with patient Completed 10/3/2023   Responsible User: Amalia Jacob        Goal: Taking Medication - patient is consistently taking medications as directed         Task: Provide education on action of prescribed medication, including when to take and possible side effects Completed 10/3/2023   Responsible User: Nidia  Amalia MOYER        Goal: Healthy Coping - use available resources to cope with the challenges of managing diabetes         Task: Provide education on benefits of utilizing support systems Completed 10/3/2023   Responsible User: Amalia Jacob RDN, TAQUERAI, [facility]      Time Spent: 60 minutes  Encounter Type: Individual    Any diabetes medication dose changes were made via the CDE Protocol per the patient's referring provider. A copy of this encounter was shared with the provider.

## 2023-11-07 ENCOUNTER — ALLIED HEALTH/NURSE VISIT (OUTPATIENT)
Dept: EDUCATION SERVICES | Facility: CLINIC | Age: 53
End: 2023-11-07
Payer: COMMERCIAL

## 2023-11-07 DIAGNOSIS — E11.69 TYPE 2 DIABETES MELLITUS WITH OTHER SPECIFIED COMPLICATION, WITHOUT LONG-TERM CURRENT USE OF INSULIN (H): Primary | ICD-10-CM

## 2023-11-07 PROCEDURE — G0108 DIAB MANAGE TRN  PER INDIV: HCPCS | Mod: AE

## 2023-11-07 NOTE — PROGRESS NOTES
Diabetes Self-Management Education & Support  Presents for: Follow-up    Type of Service: In Person Visit    ASSESSMENT:  Amalia reports struggling with testing her BG. Reviewed technique and she tested 3 times in the clinic today. Feels more confident with process.  Fasting BG this mornin, 115 and 115 mg/dL  Reports fasting BG between  mg/dL  Highest after meal BG: <173 mg/dL  Overall, she is doing well with diabetes self management skills. Finished risk reduction diabetes education today.     Patient's most recent   Lab Results   Component Value Date    A1C 6.5 2023    A1C 6.1 2021     is meeting goal of <7.0    Diabetes knowledge and skills assessment:   Patient is knowledgeable in diabetes management concepts related to: Healthy Eating, Being Active, Taking Medication, Problem Solving, and Reducing Risks    Continue education with the following diabetes management concepts: Monitoring and Healthy Coping    Based on learning assessment above, most appropriate setting for further diabetes education would be: Individual setting.      PLAN  Continue to take Metformin as prescribed.  Test BG BID, 2 days per week  Continue to follow healthy eating meal plan and exercise recommendations.     Follow-up: with PCP    See Care Plan for co-developed, patient-state behavior change goals.    SUBJECTIVE/OBJECTIVE:  Presents for: Follow-up  Accompanied by: Self  Diabetes education in the past 24mo: Yes  Focus of Visit: Monitoring, Healthy Eating  Diabetes type: Type 2  Date of diagnosis: 2023  Disease course: Stable  How confident are you filling out medical forms by yourself:: Extremely  Transportation concerns: No  Difficulty affording diabetes testing supplies?: No  Other concerns:: None  Cultural Influences/Ethnic Background:  Not  or     Diabetes Symptoms & Complications:  Symptom course: Stable    Patient Problem List and Family Medical History reviewed for relevant medical  "history, current medical status, and diabetes risk factors.    Vitals:  LMP 12/06/2005 (LMP Unknown)   Estimated body mass index is 36.14 kg/m  as calculated from the following:    Height as of 8/22/23: 1.6 m (5' 3\").    Weight as of 8/22/23: 92.5 kg (204 lb).   Last 3 BP:   BP Readings from Last 3 Encounters:   08/22/23 128/64   07/20/23 133/86   10/13/22 (!) 140/70       History   Smoking Status    Never   Smokeless Tobacco    Never       Labs:  Lab Results   Component Value Date    A1C 6.5 08/22/2023    A1C 6.1 05/25/2021     Lab Results   Component Value Date     08/22/2023     05/06/2022     05/25/2021     Lab Results   Component Value Date     08/22/2023     05/25/2021     HDL Cholesterol   Date Value Ref Range Status   05/25/2021 49 (L) >49 mg/dL Final     Direct Measure HDL   Date Value Ref Range Status   08/22/2023 47 (L) >=50 mg/dL Final   ]  GFR Estimate   Date Value Ref Range Status   08/22/2023 >90 >60 mL/min/1.73m2 Final   05/25/2021 >90 >60 mL/min/[1.73_m2] Final     Comment:     Non  GFR Calc  Starting 12/18/2018, serum creatinine based estimated GFR (eGFR) will be   calculated using the Chronic Kidney Disease Epidemiology Collaboration   (CKD-EPI) equation.       GFR Estimate If Black   Date Value Ref Range Status   05/25/2021 >90 >60 mL/min/[1.73_m2] Final     Comment:      GFR Calc  Starting 12/18/2018, serum creatinine based estimated GFR (eGFR) will be   calculated using the Chronic Kidney Disease Epidemiology Collaboration   (CKD-EPI) equation.       Lab Results   Component Value Date    CR 0.35 08/22/2023    CR 0.40 05/25/2021     No results found for: \"MICROALBUMIN\"    Healthy Eating:  Healthy Eating Assessed Today: Yes  Meal planning/habits: None  Meals include: Breakfast, Lunch, Dinner  Breakfast: bagel with butter and peanut butter and no jelly  Lunch: sandwich or leftovers  Dinner: steak dinner with potato and vegetables " OR asparagus, shrimp and tartar sauce and later bagel with peanut butter  Other: Not eating french fries, was drinking 2 cans of regular Mt dew, less diet soda  Beverages: Water, Soda, Diet soda    Being Active:  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 : Moderate (like brisk walking)  Exercise Minutes per Week: 210  Barrier to exercise: None    Monitoring:  Monitoring Assessed Today: No (Taught FreeStyle Lite meter)  Did patient bring glucose meter to appointment? : Yes  Blood Glucose Meter: FreeStyle  Times checking blood sugar at home (number): 2  Times checking blood sugar at home (per): Day  Blood glucose trend: Other (stable)    As above    Taking Medications:  Diabetes Medication(s)       Biguanides       metFORMIN (GLUCOPHAGE XR) 500 MG 24 hr tablet    Take 1 tablet (500 mg) by mouth daily (with dinner)      Incretin Mimetic Agents       Semaglutide (RYBELSUS) 3 MG tablet    Take 3 mg by mouth daily     Patient not taking: Reported on 9/19/2023          Taking Medication Assessed Today: Yes  Current Treatments: Oral Medication (taken by mouth)  Problems taking diabetes medications regularly?: No  Diabetes medication side effects?: No    Problem Solving:  Problem Solving Assessed Today: Yes  Is the patient at risk for hypoglycemia?: No    Reducing Risks:  Reducing Risks Assessed Today: Yes  Diabetes Risks: Age over 45 years, Hyperlipidemia, Hypertriglyceridemia  Has dilated eye exam at least once a year?: Yes  Sees dentist every 6 months?: Yes  Feet checked by healthcare provider in the last year?: No    Healthy Coping:  Healthy Coping Assessed Today: Yes  Emotional response to diabetes: Ready to learn  Informal Support system:: Spouse  Stage of change: ACTION (Actively working towards change)  Support  resources: Websites  Patient Activation Measure Survey Score:      7/19/2011     9:00 AM   CORINE Score (Last Two)   CORINE Raw Score 39   Activation Score 56.4   CORINE Level 3     Care Plan and Education Provided:  Care Plan: Diabetes   Updates made by Amalia Jacob since 11/7/2023 12:00 AM        Problem: Diabetes Self-Management Education Needed to Optimize Self-Care Behaviors         Goal: Healthy Eating - follow a healthy eating pattern for diabetes Completed 11/7/2023   Start Date: 10/3/2023   This Visit's Progress: 70%   Recent Progress: 0%   Note:    Use the plate method for 2 meals/day at least 5 days/week for the next 4 weeks.          Goal: Taking Medication - patient is consistently taking medications as directed         Task: Provide education on frequency and refill details of medications Completed 11/7/2023   Responsible User: Amalia Jacob        Goal: Problem Solving - know how to prevent and manage short-term diabetes complications         Task: Provide education on safe travel with diabetes Completed 11/7/2023   Responsible User: Amalia Jacob        Task: Provide education on how to care for diabetes on sick days Completed 11/7/2023   Responsible User: Amalia Jacob        Goal: Reducing Risks - know how to prevent and treat long-term diabetes complications         Task: Provide education on major complications of diabetes, prevention, early diagnostic measures and treatment of complications Completed 11/7/2023   Responsible User: Amalia Jacob        Task: Provide education on recommended care for dental, eye and foot health Completed 11/7/2023   Responsible User: Amalia Jacob        Task: Provide education on recommendations for heart health - lipid levels and goals, blood pressure and goals, and aspirin therapy, if indicated Completed 11/7/2023   Responsible User: Amalia Jacob RDN, TAQUEIRA, [facility]    Time Spent: 30 minutes  Encounter Type: Individual    Any  diabetes medication dose changes were made via the CDE Protocol per the patient's referring provider. A copy of this encounter was shared with the provider.

## 2023-11-07 NOTE — LETTER
2023         RE: Amalia Newman  [address] [facility] [id number]        Dear Colleague,    Thank you for referring your patient, Amalia Newman, to the [facility]. Please see a copy of my visit note below.    Diabetes Self-Management Education & Support  Presents for: Follow-up    Type of Service: In Person Visit    ASSESSMENT:  Amalia reports struggling with testing her BG. Reviewed technique and she tested 3 times in the clinic today. Feels more confident with process.  Fasting BG this mornin, 115 and 115 mg/dL  Reports fasting BG between  mg/dL  Highest after meal BG: <173 mg/dL  Overall, she is doing well with diabetes self management skills. Finished risk reduction diabetes education today.     Patient's most recent   Lab Results   Component Value Date    A1C 6.5 2023    A1C 6.1 2021     is meeting goal of <7.0    Diabetes knowledge and skills assessment:   Patient is knowledgeable in diabetes management concepts related to: Healthy Eating, Being Active, Taking Medication, Problem Solving, and Reducing Risks    Continue education with the following diabetes management concepts: Monitoring and Healthy Coping    Based on learning assessment above, most appropriate setting for further diabetes education would be: Individual setting.      PLAN  Continue to take Metformin as prescribed.  Test BG BID, 2 days per week  Continue to follow healthy eating meal plan and exercise recommendations.     Follow-up: with PCP    See Care Plan for co-developed, patient-state behavior change goals.    SUBJECTIVE/OBJECTIVE:  Presents for: Follow-up  Accompanied by: Self  Diabetes education in the past 24mo: Yes  Focus of Visit: Monitoring, Healthy Eating  Diabetes type: Type 2  Date of diagnosis: 2023  Disease course: Stable  How confident are you filling out medical forms by yourself:: Extremely  Transportation concerns: No  Difficulty affording  "diabetes testing supplies?: No  Other concerns:: None  Cultural Influences/Ethnic Background:  Not  or     Diabetes Symptoms & Complications:  Symptom course: Stable    Patient Problem List and Family Medical History reviewed for relevant medical history, current medical status, and diabetes risk factors.    Vitals:  LMP 12/06/2005 (LMP Unknown)   Estimated body mass index is 36.14 kg/m  as calculated from the following:    Height as of 8/22/23: 1.6 m (5' 3\").    Weight as of 8/22/23: 92.5 kg (204 lb).   Last 3 BP:   BP Readings from Last 3 Encounters:   08/22/23 128/64   07/20/23 133/86   10/13/22 (!) 140/70       History   Smoking Status    Never   Smokeless Tobacco    Never       Labs:  Lab Results   Component Value Date    A1C 6.5 08/22/2023    A1C 6.1 05/25/2021     Lab Results   Component Value Date     08/22/2023     05/06/2022     05/25/2021     Lab Results   Component Value Date     08/22/2023     05/25/2021     HDL Cholesterol   Date Value Ref Range Status   05/25/2021 49 (L) >49 mg/dL Final     Direct Measure HDL   Date Value Ref Range Status   08/22/2023 47 (L) >=50 mg/dL Final   ]  GFR Estimate   Date Value Ref Range Status   08/22/2023 >90 >60 mL/min/1.73m2 Final   05/25/2021 >90 >60 mL/min/[1.73_m2] Final     Comment:     Non  GFR Calc  Starting 12/18/2018, serum creatinine based estimated GFR (eGFR) will be   calculated using the Chronic Kidney Disease Epidemiology Collaboration   (CKD-EPI) equation.       GFR Estimate If Black   Date Value Ref Range Status   05/25/2021 >90 >60 mL/min/[1.73_m2] Final     Comment:      GFR Calc  Starting 12/18/2018, serum creatinine based estimated GFR (eGFR) will be   calculated using the Chronic Kidney Disease Epidemiology Collaboration   (CKD-EPI) equation.       Lab Results   Component Value Date    CR 0.35 08/22/2023    CR 0.40 05/25/2021     No results found for: " "\"MICROALBUMIN\"    Healthy Eating:  Healthy Eating Assessed Today: Yes  Meal planning/habits: None  Meals include: Breakfast, Lunch, Dinner  Breakfast: bagel with butter and peanut butter and no jelly  Lunch: sandwich or leftovers  Dinner: steak dinner with potato and vegetables OR asparagus, shrimp and tartar sauce and later bagel with peanut butter  Other: Not eating french fries, was drinking 2 cans of regular Mt dew, less diet soda  Beverages: Water, Soda, Diet soda    Being Active:  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 : Moderate (like brisk walking)  Exercise Minutes per Week: 210  Barrier to exercise: None    Monitoring:  Monitoring Assessed Today: No (Taught FreeStyle Lite meter)  Did patient bring glucose meter to appointment? : Yes  Blood Glucose Meter: FreeStyle  Times checking blood sugar at home (number): 2  Times checking blood sugar at home (per): Day  Blood glucose trend: Other (stable)    As above    Taking Medications:  Diabetes Medication(s)       Biguanides       metFORMIN (GLUCOPHAGE XR) 500 MG 24 hr tablet    Take 1 tablet (500 mg) by mouth daily (with dinner)      Incretin Mimetic Agents       Semaglutide (RYBELSUS) 3 MG tablet    Take 3 mg by mouth daily     Patient not taking: Reported on 9/19/2023          Taking Medication Assessed Today: Yes  Current Treatments: Oral Medication (taken by mouth)  Problems taking diabetes medications regularly?: No  Diabetes medication side effects?: No    Problem Solving:  Problem Solving Assessed Today: Yes  Is the patient at risk for hypoglycemia?: No    Reducing Risks:  Reducing Risks Assessed Today: Yes  Diabetes Risks: Age over 45 years, Hyperlipidemia, Hypertriglyceridemia  Has dilated eye exam at least once a year?: Yes  Sees dentist every 6 " months?: Yes  Feet checked by healthcare provider in the last year?: No    Healthy Coping:  Healthy Coping Assessed Today: Yes  Emotional response to diabetes: Ready to learn  Informal Support system:: Spouse  Stage of change: ACTION (Actively working towards change)  Support resources: Websites  Patient Activation Measure Survey Score:      7/19/2011     9:00 AM   CORINE Score (Last Two)   CORINE Raw Score 39   Activation Score 56.4   CORINE Level 3     Care Plan and Education Provided:  Care Plan: Diabetes   Updates made by Amalia Jacob since 11/7/2023 12:00 AM        Problem: Diabetes Self-Management Education Needed to Optimize Self-Care Behaviors         Goal: Healthy Eating - follow a healthy eating pattern for diabetes Completed 11/7/2023   Start Date: 10/3/2023   This Visit's Progress: 70%   Recent Progress: 0%   Note:    Use the plate method for 2 meals/day at least 5 days/week for the next 4 weeks.          Goal: Taking Medication - patient is consistently taking medications as directed         Task: Provide education on frequency and refill details of medications Completed 11/7/2023   Responsible User: Amalia Jacob        Goal: Problem Solving - know how to prevent and manage short-term diabetes complications         Task: Provide education on safe travel with diabetes Completed 11/7/2023   Responsible User: Amalia Jacob        Task: Provide education on how to care for diabetes on sick days Completed 11/7/2023   Responsible User: Amalia Jacob        Goal: Reducing Risks - know how to prevent and treat long-term diabetes complications         Task: Provide education on major complications of diabetes, prevention, early diagnostic measures and treatment of complications Completed 11/7/2023   Responsible User: Amalia Jacob        Task: Provide education on recommended care for dental, eye and foot health Completed 11/7/2023   Responsible User: Amalia Jacob        Task: Provide education on  recommendations for heart health - lipid levels and goals, blood pressure and goals, and aspirin therapy, if indicated Completed 11/7/2023   Responsible User: Amalia Jacob RDN, [place], [facility]    Time Spent: 30 minutes  Encounter Type: Individual    Any diabetes medication dose changes were made via the CDE Protocol per the patient's referring provider. A copy of this encounter was shared with the provider.

## 2024-02-20 ENCOUNTER — ALLIED HEALTH/NURSE VISIT (OUTPATIENT)
Dept: INTERNAL MEDICINE | Facility: CLINIC | Age: 54
End: 2024-02-20
Payer: COMMERCIAL

## 2024-02-20 DIAGNOSIS — Z23 NEED FOR VACCINATION: Primary | ICD-10-CM

## 2024-02-20 PROCEDURE — 90746 HEPB VACCINE 3 DOSE ADULT IM: CPT

## 2024-02-20 PROCEDURE — 90471 IMMUNIZATION ADMIN: CPT

## 2024-02-20 PROCEDURE — 99207 PR NO CHARGE NURSE ONLY: CPT

## 2024-05-22 ENCOUNTER — OFFICE VISIT (OUTPATIENT)
Dept: URGENT CARE | Facility: URGENT CARE | Age: 54
End: 2024-05-22
Payer: COMMERCIAL

## 2024-05-22 VITALS
BODY MASS INDEX: 36.14 KG/M2 | DIASTOLIC BLOOD PRESSURE: 85 MMHG | SYSTOLIC BLOOD PRESSURE: 153 MMHG | OXYGEN SATURATION: 98 % | WEIGHT: 204 LBS | HEART RATE: 67 BPM | RESPIRATION RATE: 20 BRPM | TEMPERATURE: 98.3 F

## 2024-05-22 DIAGNOSIS — N64.4 BREAST PAIN: Primary | ICD-10-CM

## 2024-05-22 PROCEDURE — 99213 OFFICE O/P EST LOW 20 MIN: CPT | Performed by: PHYSICIAN ASSISTANT

## 2024-05-22 NOTE — PATIENT INSTRUCTIONS
(N64.4) Breast pain  (primary encounter diagnosis)  Comment: likely secondary to new bra  Plan: expect symptoms to improve over time.  I would suggest slowly tapering into wearing the bra however.  Tylenol of ibuprofen as needed.

## 2024-05-22 NOTE — PROGRESS NOTES
Patient presents with:  Urgent Care: Breast tenderness, possible its because she just got some new bra , but she wants to have her breast check, she does have a mammogram set up in July and an appointment with her PCP in August     (N64.4) Breast pain  (primary encounter diagnosis)  Comment: likely secondary to new bra  Plan: expect symptoms to improve over time.  I would suggest slowly tapering into wearing the bra however.  Tylenol of ibuprofen as needed.        At the end of the encounter, I discussed results, diagnosis, medications. Discussed red flags for immediate return to clinic/ER, as well as indications for follow up if no improvement. Patient understood and agreed to plan. Patient was stable for discharge       SUBJECTIVE:   Amalia Newman is a 54 year old female presents today with bilateral breast discomfort for the past few days.  Does report that she recently purchased and is wearing a new bra that is a little tighter than normal.  Denies any other potential injury.  Denies any nipple discharge.  Denies any rash.  Denies any itching.  She is otherwise in her usual state of good health.        Patient Active Problem List   Diagnosis    Pure hyperglyceridemia    CARDIOVASCULAR SCREENING; LDL GOAL LESS THAN 130    Abnormal glucose    Benign essential hypertension    Sepsis (H)    Infection due to ESBL-producing Escherichia coli    E. coli sepsis (H)    Sepsis due to gram-negative UTI (H)    Acute pain of left knee    Morbid obesity (H)         Past Medical History:   Diagnosis Date    E. coli sepsis (H) 6/1/2019    Essential hypertension, benign 1996    Infection due to ESBL-producing Escherichia coli 6/1/2019    Pure hyperglyceridemia     Tachycardia          Current Outpatient Medications   Medication Sig Dispense Refill    Multiple Vitamins-Iron (DAILY-FRIDA/IRON/BETA-CAROTENE) TABS TAKE 1 TABLET BY MOUTH DAILY. (Patient not taking: Reported on 10/19/2020) 30 tablet 7     Social History     Tobacco  Use    Smoking status: Never Smoker    Smokeless tobacco: Never Used   Substance Use Topics    Alcohol use: Not on file     Family History   Problem Relation Age of Onset    Diabetes Mother     Diabetes Father          ROS:    10 point ROS of systems including Constitutional, Eyes, Respiratory, Cardiovascular, Gastroenterology, Genitourinary, Integumentary, Muscularskeletal, Psychiatric ,neurological were all negative except for pertinent positives noted in my HPI       OBJECTIVE:  BP (!) 153/85   Pulse 67   Temp 98.3  F (36.8  C)   Resp 20   Wt 92.5 kg (204 lb)   LMP 12/06/2005 (LMP Unknown)   SpO2 98%   BMI 36.14 kg/m    Physical Exam:  GENERAL APPEARANCE: healthy, alert and no distress  SKIN: no suspicious lesions or rashes  BREASTS: No dimpling or masses appreciated.  No nipple discharge or rashes.

## 2024-07-02 ENCOUNTER — HOSPITAL ENCOUNTER (OUTPATIENT)
Dept: MAMMOGRAPHY | Facility: CLINIC | Age: 54
Discharge: HOME | End: 2024-07-02
Attending: NURSE PRACTITIONER | Admitting: NURSE PRACTITIONER
Payer: COMMERCIAL

## 2024-07-02 DIAGNOSIS — Z12.31 VISIT FOR SCREENING MAMMOGRAM: ICD-10-CM

## 2024-07-02 PROCEDURE — 77063 BREAST TOMOSYNTHESIS BI: CPT

## 2024-07-22 DIAGNOSIS — E11.69 TYPE 2 DIABETES MELLITUS WITH OTHER SPECIFIED COMPLICATION, WITHOUT LONG-TERM CURRENT USE OF INSULIN (H): ICD-10-CM

## 2024-07-22 DIAGNOSIS — E78.00 HYPERCHOLESTEREMIA: ICD-10-CM

## 2024-07-22 DIAGNOSIS — I10 BENIGN ESSENTIAL HYPERTENSION: ICD-10-CM

## 2024-07-22 RX ORDER — LISINOPRIL 10 MG/1
10 TABLET ORAL DAILY
Qty: 90 TABLET | Refills: 0 | Status: SHIPPED | OUTPATIENT
Start: 2024-07-22 | End: 2024-09-10

## 2024-07-22 RX ORDER — ROSUVASTATIN CALCIUM 20 MG/1
20 TABLET, COATED ORAL DAILY
Qty: 90 TABLET | Refills: 0 | Status: SHIPPED | OUTPATIENT
Start: 2024-07-22 | End: 2024-09-10

## 2024-07-23 ENCOUNTER — PATIENT OUTREACH (OUTPATIENT)
Dept: INTERNAL MEDICINE | Facility: CLINIC | Age: 54
End: 2024-07-23
Payer: COMMERCIAL

## 2024-07-23 NOTE — TELEPHONE ENCOUNTER
Patient Quality Outreach    Patient is due for the following:   Diabetes -  A1C, Eye Exam, Microalbumin, Diabetic Follow-Up Visit, and Foot Exam  Hypertension -  Hypertension follow-up visit  Colon Cancer Screening    Next Steps:   Patient has upcoming appointment, these items will be addressed at that time.    Type of outreach:    Chart review performed, no outreach needed.      Questions for provider review:    None           Adrienne Wood LPN  Chart routed to none.

## 2024-07-27 DIAGNOSIS — I10 BENIGN ESSENTIAL HYPERTENSION: ICD-10-CM

## 2024-07-29 RX ORDER — DILTIAZEM HYDROCHLORIDE 360 MG/1
360 CAPSULE, EXTENDED RELEASE ORAL DAILY
Qty: 90 CAPSULE | Refills: 0 | Status: SHIPPED | OUTPATIENT
Start: 2024-07-29 | End: 2024-09-10

## 2024-08-18 DIAGNOSIS — E66.01 MORBID OBESITY (H): ICD-10-CM

## 2024-08-18 DIAGNOSIS — E11.69 TYPE 2 DIABETES MELLITUS WITH OTHER SPECIFIED COMPLICATION, WITHOUT LONG-TERM CURRENT USE OF INSULIN (H): ICD-10-CM

## 2024-08-20 RX ORDER — METFORMIN HCL 500 MG
500 TABLET, EXTENDED RELEASE 24 HR ORAL
Qty: 30 TABLET | Refills: 0 | Status: SHIPPED | OUTPATIENT
Start: 2024-08-20 | End: 2024-09-10

## 2024-09-10 ENCOUNTER — OFFICE VISIT (OUTPATIENT)
Dept: INTERNAL MEDICINE | Facility: CLINIC | Age: 54
End: 2024-09-10
Payer: COMMERCIAL

## 2024-09-10 VITALS
TEMPERATURE: 97.7 F | RESPIRATION RATE: 18 BRPM | OXYGEN SATURATION: 100 % | DIASTOLIC BLOOD PRESSURE: 72 MMHG | WEIGHT: 191.2 LBS | HEIGHT: 64 IN | HEART RATE: 72 BPM | BODY MASS INDEX: 32.64 KG/M2 | SYSTOLIC BLOOD PRESSURE: 110 MMHG

## 2024-09-10 DIAGNOSIS — E78.1 PURE HYPERGLYCERIDEMIA: ICD-10-CM

## 2024-09-10 DIAGNOSIS — E83.52 SERUM CALCIUM ELEVATED: ICD-10-CM

## 2024-09-10 DIAGNOSIS — Z12.11 SCREEN FOR COLON CANCER: ICD-10-CM

## 2024-09-10 DIAGNOSIS — E66.01 MORBID OBESITY (H): ICD-10-CM

## 2024-09-10 DIAGNOSIS — Z00.00 ROUTINE GENERAL MEDICAL EXAMINATION AT A HEALTH CARE FACILITY: Primary | ICD-10-CM

## 2024-09-10 DIAGNOSIS — R73.09 ABNORMAL GLUCOSE: ICD-10-CM

## 2024-09-10 DIAGNOSIS — E11.69 TYPE 2 DIABETES MELLITUS WITH OTHER SPECIFIED COMPLICATION, WITHOUT LONG-TERM CURRENT USE OF INSULIN (H): ICD-10-CM

## 2024-09-10 DIAGNOSIS — E78.00 HYPERCHOLESTEREMIA: ICD-10-CM

## 2024-09-10 DIAGNOSIS — I10 BENIGN ESSENTIAL HYPERTENSION: ICD-10-CM

## 2024-09-10 LAB
ALBUMIN SERPL BCG-MCNC: 4.6 G/DL (ref 3.5–5.2)
ALP SERPL-CCNC: 35 U/L (ref 40–150)
ALT SERPL W P-5'-P-CCNC: 18 U/L (ref 0–50)
ANION GAP SERPL CALCULATED.3IONS-SCNC: 12 MMOL/L (ref 7–15)
AST SERPL W P-5'-P-CCNC: 19 U/L (ref 0–45)
BASOPHILS # BLD AUTO: 0 10E3/UL (ref 0–0.2)
BASOPHILS NFR BLD AUTO: 1 %
BILIRUB SERPL-MCNC: 0.2 MG/DL
BUN SERPL-MCNC: 17.1 MG/DL (ref 6–20)
CALCIUM SERPL-MCNC: 10.8 MG/DL (ref 8.8–10.4)
CHLORIDE SERPL-SCNC: 100 MMOL/L (ref 98–107)
CHOLEST SERPL-MCNC: 157 MG/DL
CREAT SERPL-MCNC: 0.45 MG/DL (ref 0.51–0.95)
CREAT UR-MCNC: 34.3 MG/DL
EGFRCR SERPLBLD CKD-EPI 2021: >90 ML/MIN/1.73M2
EOSINOPHIL # BLD AUTO: 0.2 10E3/UL (ref 0–0.7)
EOSINOPHIL NFR BLD AUTO: 3 %
ERYTHROCYTE [DISTWIDTH] IN BLOOD BY AUTOMATED COUNT: 12.6 % (ref 10–15)
FASTING STATUS PATIENT QL REPORTED: YES
FASTING STATUS PATIENT QL REPORTED: YES
GLUCOSE SERPL-MCNC: 115 MG/DL (ref 70–99)
HBA1C MFR BLD: 6 % (ref 0–5.6)
HCO3 SERPL-SCNC: 24 MMOL/L (ref 22–29)
HCT VFR BLD AUTO: 38.4 % (ref 35–47)
HDLC SERPL-MCNC: 48 MG/DL
HGB BLD-MCNC: 13.3 G/DL (ref 11.7–15.7)
IMM GRANULOCYTES # BLD: 0 10E3/UL
IMM GRANULOCYTES NFR BLD: 0 %
LDLC SERPL CALC-MCNC: 59 MG/DL
LYMPHOCYTES # BLD AUTO: 1.3 10E3/UL (ref 0.8–5.3)
LYMPHOCYTES NFR BLD AUTO: 20 %
MCH RBC QN AUTO: 31.3 PG (ref 26.5–33)
MCHC RBC AUTO-ENTMCNC: 34.6 G/DL (ref 31.5–36.5)
MCV RBC AUTO: 90 FL (ref 78–100)
MICROALBUMIN UR-MCNC: <12 MG/L
MICROALBUMIN/CREAT UR: NORMAL MG/G{CREAT}
MONOCYTES # BLD AUTO: 0.7 10E3/UL (ref 0–1.3)
MONOCYTES NFR BLD AUTO: 10 %
NEUTROPHILS # BLD AUTO: 4.4 10E3/UL (ref 1.6–8.3)
NEUTROPHILS NFR BLD AUTO: 67 %
NONHDLC SERPL-MCNC: 109 MG/DL
PLATELET # BLD AUTO: 287 10E3/UL (ref 150–450)
POTASSIUM SERPL-SCNC: 4.8 MMOL/L (ref 3.4–5.3)
PROT SERPL-MCNC: 7.2 G/DL (ref 6.4–8.3)
RBC # BLD AUTO: 4.25 10E6/UL (ref 3.8–5.2)
SODIUM SERPL-SCNC: 136 MMOL/L (ref 135–145)
TRIGL SERPL-MCNC: 251 MG/DL
TSH SERPL DL<=0.005 MIU/L-ACNC: 1.57 UIU/ML (ref 0.3–4.2)
WBC # BLD AUTO: 6.6 10E3/UL (ref 4–11)

## 2024-09-10 PROCEDURE — 82306 VITAMIN D 25 HYDROXY: CPT | Performed by: NURSE PRACTITIONER

## 2024-09-10 PROCEDURE — 90715 TDAP VACCINE 7 YRS/> IM: CPT | Performed by: NURSE PRACTITIONER

## 2024-09-10 PROCEDURE — 83036 HEMOGLOBIN GLYCOSYLATED A1C: CPT | Performed by: NURSE PRACTITIONER

## 2024-09-10 PROCEDURE — 99396 PREV VISIT EST AGE 40-64: CPT | Mod: 25 | Performed by: NURSE PRACTITIONER

## 2024-09-10 PROCEDURE — 90471 IMMUNIZATION ADMIN: CPT | Performed by: NURSE PRACTITIONER

## 2024-09-10 PROCEDURE — 82570 ASSAY OF URINE CREATININE: CPT | Performed by: NURSE PRACTITIONER

## 2024-09-10 PROCEDURE — 82043 UR ALBUMIN QUANTITATIVE: CPT | Performed by: NURSE PRACTITIONER

## 2024-09-10 PROCEDURE — 99214 OFFICE O/P EST MOD 30 MIN: CPT | Mod: 25 | Performed by: NURSE PRACTITIONER

## 2024-09-10 PROCEDURE — 36415 COLL VENOUS BLD VENIPUNCTURE: CPT | Performed by: NURSE PRACTITIONER

## 2024-09-10 PROCEDURE — 84443 ASSAY THYROID STIM HORMONE: CPT | Performed by: NURSE PRACTITIONER

## 2024-09-10 PROCEDURE — 85025 COMPLETE CBC W/AUTO DIFF WBC: CPT | Performed by: NURSE PRACTITIONER

## 2024-09-10 PROCEDURE — 80053 COMPREHEN METABOLIC PANEL: CPT | Performed by: NURSE PRACTITIONER

## 2024-09-10 PROCEDURE — 80061 LIPID PANEL: CPT | Performed by: NURSE PRACTITIONER

## 2024-09-10 RX ORDER — DILTIAZEM HYDROCHLORIDE 360 MG/1
360 CAPSULE, EXTENDED RELEASE ORAL DAILY
Qty: 90 CAPSULE | Refills: 3 | Status: SHIPPED | OUTPATIENT
Start: 2024-09-10

## 2024-09-10 RX ORDER — METFORMIN HCL 500 MG
500 TABLET, EXTENDED RELEASE 24 HR ORAL
Qty: 90 TABLET | Refills: 3 | Status: SHIPPED | OUTPATIENT
Start: 2024-09-10

## 2024-09-10 RX ORDER — LISINOPRIL 10 MG/1
10 TABLET ORAL DAILY
Qty: 90 TABLET | Refills: 3 | Status: SHIPPED | OUTPATIENT
Start: 2024-09-10

## 2024-09-10 RX ORDER — ROSUVASTATIN CALCIUM 20 MG/1
20 TABLET, COATED ORAL DAILY
Qty: 90 TABLET | Refills: 3 | Status: SHIPPED | OUTPATIENT
Start: 2024-09-10

## 2024-09-10 ASSESSMENT — PAIN SCALES - GENERAL: PAINLEVEL: NO PAIN (0)

## 2024-09-10 NOTE — PROGRESS NOTES
Preventive Care Visit  [facility]  WADE Dillon CNP, Internal Medicine  Sep 10, 2024      Assessment & Plan     Routine general medical examination at a health care facility    - COMPREHENSIVE METABOLIC PANEL; Future  - Lipid panel reflex to direct LDL Fasting; Future  - TSH WITH FREE T4 REFLEX; Future  - CBC with platelets and differential; Future  - COMPREHENSIVE METABOLIC PANEL  - Lipid panel reflex to direct LDL Fasting  - TSH WITH FREE T4 REFLEX  - CBC with platelets and differential    Type 2 diabetes mellitus with other specified complication, without long-term current use of insulin (H)  Doing well on metformin    - Albumin Random Urine Quantitative with Creat Ratio; Future  - HEMOGLOBIN A1C; Future  - COMPREHENSIVE METABOLIC PANEL; Future  - Lipid panel reflex to direct LDL Fasting; Future  - TSH WITH FREE T4 REFLEX; Future  - CBC with platelets and differential; Future  - metFORMIN (GLUCOPHAGE XR) 500 MG 24 hr tablet; Take 1 tablet (500 mg) by mouth daily (with dinner).  - rosuvastatin (CRESTOR) 20 MG tablet; Take 1 tablet (20 mg) by mouth daily.  - blood glucose (NO BRAND SPECIFIED) test strip; Use to test blood sugar 1 times daily or as directed. To accompany: Blood Glucose Monitor Brands: per insurance.  - Albumin Random Urine Quantitative with Creat Ratio  - HEMOGLOBIN A1C  - COMPREHENSIVE METABOLIC PANEL  - Lipid panel reflex to direct LDL Fasting  - TSH WITH FREE T4 REFLEX  - CBC with platelets and differential    Benign essential hypertension  In good range   - Albumin Random Urine Quantitative with Creat Ratio; Future  - HEMOGLOBIN A1C; Future  - COMPREHENSIVE METABOLIC PANEL; Future  - Lipid panel reflex to direct LDL Fasting; Future  - TSH WITH FREE T4 REFLEX; Future  - CBC with platelets and differential; Future  - diltiazem ER COATED BEADS (CARDIZEM CD) 360 MG 24 hr capsule; Take 1 capsule (360 mg) by mouth daily.  - lisinopril (ZESTRIL) 10 MG tablet; Take 1  "tablet (10 mg) by mouth daily.  - Albumin Random Urine Quantitative with Creat Ratio  - HEMOGLOBIN A1C  - COMPREHENSIVE METABOLIC PANEL  - Lipid panel reflex to direct LDL Fasting  - TSH WITH FREE T4 REFLEX  - CBC with platelets and differential    Screen for colon cancer  Declined     Abnormal glucose    - HEMOGLOBIN A1C; Future  - HEMOGLOBIN A1C    Pure hyperglyceridemia    - COMPREHENSIVE METABOLIC PANEL; Future  - Lipid panel reflex to direct LDL Fasting; Future  - COMPREHENSIVE METABOLIC PANEL  - Lipid panel reflex to direct LDL Fasting    Morbid obesity (H)    - metFORMIN (GLUCOPHAGE XR) 500 MG 24 hr tablet; Take 1 tablet (500 mg) by mouth daily (with dinner).    Hypercholesteremia  Tolerating medication   - rosuvastatin (CRESTOR) 20 MG tablet; Take 1 tablet (20 mg) by mouth daily.            BMI  Estimated body mass index is 33.34 kg/m  as calculated from the following:    Height as of this encounter: 1.613 m (5' 3.5\").    Weight as of this encounter: 86.7 kg (191 lb 3.2 oz).       Counseling  Appropriate preventive services were addressed with this patient via screening, questionnaire, or discussion as appropriate for fall prevention, nutrition, physical activity, Tobacco-use cessation, social engagement, weight loss and cognition.  Checklist reviewing preventive services available has been given to the patient.  Reviewed patient's diet, addressing concerns and/or questions.   The patient was instructed to see the dentist every 6 months.       Patient Instructions   Lab in [address]    Medication refilled     Eduardo Holland is a 54 year old, presenting for the following:  Physical        9/10/2024     8:17 AM   Additional Questions   Roomed by Rosario Beckford   Accompanied by self         9/10/2024     8:17 AM   Patient Reported Additional Medications   Patient reports taking the following new medications no        Health Care Directive  Patient does not have a Health Care Directive or Living Will: " Discussed advance care planning with patient; however, patient declined at this time.    HPI  Fasting     Glucose 110     TDAP due       9/10/2024   General Health   How would you rate your overall physical health? Good   Feel stress (tense, anxious, or unable to sleep) Not at all            9/10/2024   Nutrition   Three or more servings of calcium each day? Yes   Diet: Regular (no restrictions)   How many servings of fruit and vegetables per day? (!) 2-3   How many sweetened beverages each day? 0-1            9/10/2024   Exercise   Days per week of moderate/strenous exercise 7 days   Average minutes spent exercising at this level 30 min            9/10/2024   Social Factors   Frequency of gathering with friends or relatives Once a week   Worry food won't last until get money to buy more No   Food not last or not have enough money for food? No   Do you have housing? (Housing is defined as stable permanent housing and does not include staying ouside in a car, in a tent, in an abandoned building, in an overnight shelter, or couch-surfing.) Yes   Are you worried about losing your housing? No   Lack of transportation? No   Unable to get utilities (heat,electricity)? No            9/10/2024   Fall Risk   Fallen 2 or more times in the past year? No   Trouble with walking or balance? No             9/10/2024   Dental   Dentist two times every year? (!) NO            9/10/2024   TB Screening   Were you born outside of the [place]? No            Today's PHQ-2 Score:       9/10/2024     8:17 AM   PHQ-2 ( 1999 [employer])   Q1: Little interest or pleasure in doing things 1   Q2: Feeling down, depressed or hopeless 0   PHQ-2 Score 1   Q1: Little interest or pleasure in doing things Several days   Q2: Feeling down, depressed or hopeless Not at all   PHQ-2 Score 1           9/10/2024   Substance Use   Alcohol more than 3/day or more than 7/wk No   Do you use any other substances recreationally? No        Social History     Tobacco Use     "Smoking status: Never    Smokeless tobacco: Never   Vaping Use    Vaping status: Never Used   Substance Use Topics    Alcohol use: Yes     Comment: \"very rare\"    Drug use: No           7/2/2024   LAST FHS-7 RESULTS   1st degree relative breast or ovarian cancer No   Any relative bilateral breast cancer No   Any male have breast cancer No   Any ONE woman have BOTH breast AND ovarian cancer No   Any woman with breast cancer before 50yrs No   2 or more relatives with breast AND/OR ovarian cancer No   2 or more relatives with breast AND/OR bowel cancer No                   9/10/2024   STI Screening   New sexual partner(s) since last STI/HIV test? No        History of abnormal Pap smear:         1/22/2008    12:00 AM 1/9/2007    12:00 AM 12/13/2005    12:00 AM   PAP / HPV   PAP (Historical) NIL  NIL  NIL      ASCVD Risk   The 10-year ASCVD risk score (Sydnee PETERSON, et al., 2019) is: 4.9%    Values used to calculate the score:      Age: 54 years      Sex: Female      Is Non- : No      Diabetic: Yes      Tobacco smoker: No      Systolic Blood Pressure: 110 mmHg      Is BP treated: Yes      HDL Cholesterol: 47 mg/dL      Total Cholesterol: 220 mg/dL           Reviewed and updated as needed this visit by Provider                    Lab work is in process      Review of Systems  Constitutional, neuro, ENT, endocrine, pulmonary, cardiac, gastrointestinal, genitourinary, musculoskeletal, integument and psychiatric systems are negative, except as otherwise noted.     Objective    Exam  /72 (BP Location: Left arm, Patient Position: Sitting, Cuff Size: Adult Large)   Pulse 72   Temp 97.7  F (36.5  C) (Oral)   Resp 18   Ht 1.613 m (5' 3.5\")   Wt 86.7 kg (191 lb 3.2 oz)   LMP 12/06/2005 (LMP Unknown)   SpO2 100%   BMI 33.34 kg/m     Estimated body mass index is 33.34 kg/m  as calculated from the following:    Height as of this encounter: 1.613 m (5' 3.5\").    Weight as of this encounter: " 86.7 kg (191 lb 3.2 oz).    Physical Exam  GENERAL: alert and no distress  EYES: Eyes grossly normal to inspection, and conjunctivae and sclerae normal  HENT: ear canals and TM's normal, nose and mouth without ulcers or lesions  NECK: no adenopathy, no asymmetry, masses, or scars  RESP: lungs clear to auscultation - no rales, rhonchi or wheezes  CV: regular rate and rhythm, normal S1 S2, no S3 or S4, no murmur, click or rub, no peripheral edema  ABDOMEN: soft, nontender, no hepatosplenomegaly, no masses and bowel sounds normal  MS: no gross musculoskeletal defects noted, no edema  SKIN: no suspicious lesions or rashes  NEURO: Normal strength and tone, mentation intact and speech normal  PSYCH: mentation appears normal, affect normal/bright        Signed Electronically by: WADE Dillon CNP

## 2024-09-11 DIAGNOSIS — E55.9 VITAMIN D DEFICIENCY: Primary | ICD-10-CM

## 2024-09-11 DIAGNOSIS — E83.52 SERUM CALCIUM ELEVATED: Primary | ICD-10-CM

## 2024-09-11 LAB — VIT D+METAB SERPL-MCNC: 16 NG/ML (ref 20–50)

## 2024-09-11 RX ORDER — CHOLECALCIFEROL (VITAMIN D3) 50 MCG
1 TABLET ORAL DAILY
Qty: 100 TABLET | Refills: 3 | Status: SHIPPED | OUTPATIENT
Start: 2024-09-11

## 2025-06-02 ENCOUNTER — PATIENT OUTREACH (OUTPATIENT)
Dept: CARE COORDINATION | Facility: CLINIC | Age: 55
End: 2025-06-02
Payer: COMMERCIAL

## 2025-06-18 ENCOUNTER — OFFICE VISIT (OUTPATIENT)
Dept: URGENT CARE | Facility: URGENT CARE | Age: 55
End: 2025-06-18
Payer: COMMERCIAL

## 2025-06-18 VITALS
HEART RATE: 82 BPM | BODY MASS INDEX: 34.59 KG/M2 | RESPIRATION RATE: 16 BRPM | SYSTOLIC BLOOD PRESSURE: 116 MMHG | WEIGHT: 198.4 LBS | DIASTOLIC BLOOD PRESSURE: 75 MMHG | TEMPERATURE: 98 F | OXYGEN SATURATION: 97 %

## 2025-06-18 DIAGNOSIS — R21 RASH AND NONSPECIFIC SKIN ERUPTION: Primary | ICD-10-CM

## 2025-06-18 PROCEDURE — 99214 OFFICE O/P EST MOD 30 MIN: CPT | Performed by: PHYSICIAN ASSISTANT

## 2025-06-18 PROCEDURE — 3078F DIAST BP <80 MM HG: CPT | Performed by: PHYSICIAN ASSISTANT

## 2025-06-18 PROCEDURE — 3074F SYST BP LT 130 MM HG: CPT | Performed by: PHYSICIAN ASSISTANT

## 2025-06-18 NOTE — PROGRESS NOTES
Urgent Care Clinic Visit    Chief Complaint   Patient presents with    Urgent Care     Pt has a rash on neck spreading to chest for couple months.               6/18/2025     3:23 PM   Additional Questions   Roomed by nessa esparza   Accompanied by n/a

## 2025-06-19 ENCOUNTER — TELEPHONE (OUTPATIENT)
Dept: DERMATOLOGY | Facility: CLINIC | Age: 55
End: 2025-06-19
Payer: COMMERCIAL

## 2025-06-19 NOTE — PROGRESS NOTES
SUBJECTIVE:  Amalia Newman is a 55 year old female who presents to the clinic today for a rash.  Onset of rash was 1 month(s) ago.   Rash is sudden onset.  Location of the rash: neck.  Quality/symptoms of rash: red and asymptomatic   Symptoms are mild and rash seems to be stable.  Previous history of a similar rash? No  Recent exposure history: none known    Associated symptoms include: nothing.    Past Medical History:   Diagnosis Date    E. coli sepsis (H) 6/1/2019    Essential hypertension, benign 1996    Infection due to ESBL-producing Escherichia coli 6/1/2019    Pure hyperglyceridemia     Tachycardia      Current Outpatient Medications   Medication Sig Dispense Refill    ascorbic acid (VITAMIN C) 500 MG tablet Take 2 tablets (1,000 mg) by mouth daily 30 tablet     blood glucose (NO BRAND SPECIFIED) test strip Use to test blood sugar 1 times daily or as directed. To accompany: Blood Glucose Monitor Brands: per insurance. 100 strip 6    blood glucose monitoring (NO BRAND SPECIFIED) meter device kit Use to test blood sugar 1 times daily or as directed. Preferred blood glucose meter OR supplies to accompany: Blood Glucose Monitor Brands: per insurance. 1 kit 0    diltiazem ER COATED BEADS (CARDIZEM CD) 360 MG 24 hr capsule Take 1 capsule (360 mg) by mouth daily. 90 capsule 3    diphenhydrAMINE (BENADRYL) 2 % external cream Apply topically 3 times daily as needed for itching 120 g 0    ibuprofen (ADVIL/MOTRIN) 200 MG tablet Take 200 mg by mouth every 4 hours as needed for mild pain      lisinopril (ZESTRIL) 10 MG tablet Take 1 tablet (10 mg) by mouth daily. 90 tablet 3    metFORMIN (GLUCOPHAGE XR) 500 MG 24 hr tablet Take 1 tablet (500 mg) by mouth daily (with dinner). 90 tablet 3    rosuvastatin (CRESTOR) 20 MG tablet Take 1 tablet (20 mg) by mouth daily. 90 tablet 3    thin (NO BRAND SPECIFIED) lancets Use with lanceting device. To accompany: Blood Glucose Monitor Brands: per insurance. 100 each 6    vitamin  "D3 (CHOLECALCIFEROL) 50 mcg (2000 units) tablet Take 1 tablet (50 mcg) by mouth daily. 100 tablet 3    triamcinolone (ARISTOCORT HP) 0.5 % external cream Apply topically 2 times daily (Patient not taking: Reported on 6/18/2025) 454 g 11     Social History     Tobacco Use    Smoking status: Never    Smokeless tobacco: Never   Substance Use Topics    Alcohol use: Yes     Comment: \"very rare\"       ROS:  Review of systems negative except as stated above.    EXAM:   /75   Pulse 82   Temp 98  F (36.7  C) (Tympanic)   Resp 16   Wt 90 kg (198 lb 6.4 oz)   LMP 12/06/2005 (LMP Unknown)   SpO2 97%   BMI 34.59 kg/m    GENERAL: alert, no acute distress.  SKIN: nonblanching justin to brown patch  RESP: lungs clear to auscultation - no rales, rhonchi or wheezes  CV: regular rates and rhythm, normal S1 S2, no murmur noted        ASSESSMENT:  (R21) Rash and nonspecific skin eruption  (primary encounter diagnosis)  PLAN:  Adult Dermatology  Referral  Follow up with changes      "

## 2025-06-19 NOTE — TELEPHONE ENCOUNTER
This encounter is being sent to inform the clinic that this patient has a referral from Luis Carlos Barnes PA-C in  URGENT CARE for the diagnoses of Rash and nonspecific skin eruption; Rash and has requested that this patient be seen within Priority: 1-2 Weeks and/or with Priority: 1-2 Weeks.  Based on the availability of our provider(s), we are unable to accommodate this request.    Were all sites offered this patient?  Yes  Pt requesting  location in [place] only please  Does scheduling algorithm request to schedule next available?  Patient has been scheduled for the first available opening with Dr Ridley at  on Tuesday, 01/20/2026.  We have informed the patient that the clinic will review their referral and reach out if a sooner appointment is medically necessary.        Please review - Pt can only come on Tuesdays - and will take any Tuesday earlier than 01/20/2026 - Thank you!

## 2025-07-08 ENCOUNTER — HOSPITAL ENCOUNTER (OUTPATIENT)
Dept: MAMMOGRAPHY | Facility: CLINIC | Age: 55
Discharge: HOME | End: 2025-07-08
Attending: NURSE PRACTITIONER
Payer: COMMERCIAL

## 2025-07-08 DIAGNOSIS — Z12.31 VISIT FOR SCREENING MAMMOGRAM: ICD-10-CM

## 2025-07-08 PROCEDURE — 77063 BREAST TOMOSYNTHESIS BI: CPT

## 2025-07-14 ENCOUNTER — TELEPHONE (OUTPATIENT)
Dept: DERMATOLOGY | Facility: CLINIC | Age: 55
End: 2025-07-14
Payer: COMMERCIAL

## 2025-07-14 NOTE — TELEPHONE ENCOUNTER
[facility] Call Center    Phone Message    May a detailed message be left on voicemail: yes     Reason for Call: Other: Pt is calling clinic back about switching 7/17 appt from 1pm to 8am, please call back thanks!     Action Taken: Other: OX DERM    Travel Screening: Not Applicable     Date of Service:

## 2025-07-14 NOTE — TELEPHONE ENCOUNTER
Called and spoke with pt who had gotten waitlist message for 8 am, advised that is no longer available. Pt will keep appt as is.    Luz BOYER RN  Dermatology   [phone]

## 2025-07-17 ENCOUNTER — OFFICE VISIT (OUTPATIENT)
Dept: DERMATOLOGY | Facility: CLINIC | Age: 55
End: 2025-07-17
Payer: COMMERCIAL

## 2025-07-17 DIAGNOSIS — D48.9 NEOPLASM OF UNCERTAIN BEHAVIOR: ICD-10-CM

## 2025-07-17 ASSESSMENT — PAIN SCALES - GENERAL: PAINLEVEL_OUTOF10: NO PAIN (0)
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
## 2025-07-17 NOTE — PATIENT INSTRUCTIONS
Wound Care After a Biopsy    What is a skin biopsy?  A skin biopsy allows the doctor to examine a very small piece of tissue under the microscope to determine the diagnosis and the best treatment for the skin condition. A local anesthetic (numbing medicine)  is injected with a very small needle into the skin area to be tested. A small piece of skin is taken from the area. Sometimes a suture (stitch) is used.     What are the risks of a skin biopsy?  I will experience scar, bleeding, swelling, pain, crusting and redness. I may experience incomplete removal or recurrence. Risks of this procedure are excessive bleeding, bruising, infection, nerve damage, numbness, thick (hypertrophic or keloidal) scar and non-diagnostic biopsy.    How should I care for my wound for the first 24 hours?  Keep the wound dry and covered for 24 hours  If it bleeds, hold direct pressure on the area for 15 minutes. If bleeding does not stop then go to the emergency room  Avoid strenuous exercise the first 1-2 days or as your doctor instructs you    How should I care for the wound after 24 hours?  After 24 hours, remove the bandage  You may bathe or shower as normal  If you had a scalp biopsy, you can shampoo as usual and can use shower water to clean the biopsy site daily  Clean the wound twice a day with gentle soap and water  Do not scrub, be gentle  Apply white petroleum/Vaseline after cleaning the wound with a cotton swab or a clean finger, and keep the site covered with a Bandaid /bandage. Bandages are not necessary with a scalp biopsy  If you are unable to cover the site with a Bandaid /bandage, re-apply ointment 2-3 times a day to keep the site moist. Moisture will help with healing  Avoid strenuous activity for first 1-2 days  Avoid lakes, rivers, pools, and oceans until the stitches are removed or the site is healed    How do I clean my wound?  Wash hands thoroughly with soap or use hand  before all wound care  Clean the  wound with gentle soap and water  Apply white petroleum/Vaseline  to wound after it is clean  Replace the Bandaid /bandage to keep the wound covered for the first few days or as instructed by your doctor  If you had a scalp biopsy, warm shower water to the area on a daily basis should suffice    What should I use to clean my wound?   Cotton-tipped applicators (Qtips )  White petroleum jelly (Vaseline ). Use a clean new container and use Q-tips to apply.  Bandaids   as needed  Gentle soap     How should I care for my wound long term?  Do not get your wound dirty  Keep up with wound care for one week or until the area is healed.  A small scab will form and fall off by itself when the area is completely healed. The area will be red and will become pink in color as it heals. Sun protection is very important for how your scar will turn out. Sunscreen with an SPF 30 or greater is recommended once the area is healed.  You should have some soreness but it should be mild and slowly go away over several days. Talk to your doctor about using tylenol for pain,    When should I call my doctor?  If you have increased:   Pain or swelling  Pus or drainage (clear or slightly yellow drainage is ok)  Temperature over 100F  Spreading redness or warmth around wound    When will I hear about my results?  The biopsy results can take 2 weeks to come back.  Your results will automatically release to [employer] before your provider has even reviewed them.  The clinic will call you with the results, send you a [employer] message, or have you schedule a follow-up clinic or phone time to discuss the results.  Contact our clinics if you do not hear from us in 2 weeks. If further treatment is needed for a skin cancer, this will be done at either our [place] or [place] office.    Who should I call with questions?  [facility]: [phone]  [facility]: [phone]  For urgent  needs outside of business hours call the [facility] at [phone] and ask for the dermatology resident on call

## 2025-07-30 LAB
PATH REPORT.COMMENTS IMP SPEC: NORMAL
PATH REPORT.FINAL DX SPEC: NORMAL
PATH REPORT.GROSS SPEC: NORMAL
PATH REPORT.MICROSCOPIC SPEC OTHER STN: NORMAL
PATH REPORT.RELEVANT HX SPEC: NORMAL

## 2025-08-04 NOTE — ED TRIAGE NOTES
Pt had shaking chills on Tuesday, taking care of her sick mother. Now at 5 am more shaking chills, pt not aware of fever, came in for elevated heart rate   Detail Level: Detailed Detail Level: Zone

## 2025-08-11 ENCOUNTER — PATIENT OUTREACH (OUTPATIENT)
Dept: CARE COORDINATION | Facility: CLINIC | Age: 55
End: 2025-08-11
Payer: COMMERCIAL

## 2025-08-25 ENCOUNTER — PATIENT OUTREACH (OUTPATIENT)
Dept: CARE COORDINATION | Facility: CLINIC | Age: 55
End: 2025-08-25
Payer: COMMERCIAL